# Patient Record
Sex: FEMALE | Race: WHITE | NOT HISPANIC OR LATINO | Employment: FULL TIME | URBAN - METROPOLITAN AREA
[De-identification: names, ages, dates, MRNs, and addresses within clinical notes are randomized per-mention and may not be internally consistent; named-entity substitution may affect disease eponyms.]

---

## 2020-09-14 ENCOUNTER — AMBULATORY - HEALTHEAST (OUTPATIENT)
Dept: ADMINISTRATIVE | Facility: CLINIC | Age: 20
End: 2020-09-14

## 2020-09-14 DIAGNOSIS — K90.0 CELIAC DISEASE: ICD-10-CM

## 2021-08-11 ENCOUNTER — TRANSFERRED RECORDS (OUTPATIENT)
Dept: HEALTH INFORMATION MANAGEMENT | Facility: CLINIC | Age: 21
End: 2021-08-11
Payer: COMMERCIAL

## 2021-11-17 ENCOUNTER — OFFICE VISIT (OUTPATIENT)
Dept: FAMILY MEDICINE | Facility: CLINIC | Age: 21
End: 2021-11-17

## 2021-11-17 VITALS
WEIGHT: 167.6 LBS | SYSTOLIC BLOOD PRESSURE: 116 MMHG | HEART RATE: 77 BPM | TEMPERATURE: 97.6 F | OXYGEN SATURATION: 98 % | BODY MASS INDEX: 28.61 KG/M2 | HEIGHT: 64 IN | DIASTOLIC BLOOD PRESSURE: 68 MMHG

## 2021-11-17 DIAGNOSIS — Z76.89 HEALTH CARE HOME: ICD-10-CM

## 2021-11-17 DIAGNOSIS — Z71.89 ACP (ADVANCE CARE PLANNING): ICD-10-CM

## 2021-11-17 DIAGNOSIS — R39.9 URINARY SYMPTOM OR SIGN: Primary | ICD-10-CM

## 2021-11-17 PROBLEM — K90.0 CELIAC DISEASE: Status: ACTIVE | Noted: 2019-05-23

## 2021-11-17 PROBLEM — E07.9 THYROID DYSFUNCTION: Status: ACTIVE | Noted: 2017-12-21

## 2021-11-17 PROBLEM — J45.21 MILD INTERMITTENT ASTHMA WITH ACUTE EXACERBATION: Status: ACTIVE | Noted: 2017-04-27

## 2021-11-17 LAB
APPEARANCE UR: CLEAR
BACTERIA, UR: ABNORMAL
BILIRUB UR QL: ABNORMAL
CASTS/LPF: ABNORMAL
COLOR UR: ABNORMAL
EP/HPF: ABNORMAL
GLUCOSE URINE: ABNORMAL MG/DL
HGB UR QL: ABNORMAL
KETONES UR QL: ABNORMAL MG/DL
MISC.: ABNORMAL
NITRITE UR QL STRIP: ABNORMAL
PH UR STRIP: 7.5 PH (ref 5–7)
PROT UR QL: ABNORMAL MG/DL
RBC, UR MICRO: ABNORMAL (ref ?–2)
SP GR UR STRIP: 1.01 (ref 1–1.03)
UROBILINOGEN UR QL STRIP: 0.2 EU/DL (ref 0.2–1)
WBC #/AREA URNS HPF: ABNORMAL /[HPF]
WBC, UR MICRO: ABNORMAL (ref ?–2)

## 2021-11-17 PROCEDURE — 87088 URINE BACTERIA CULTURE: CPT | Mod: 90 | Performed by: FAMILY MEDICINE

## 2021-11-17 PROCEDURE — 87086 URINE CULTURE/COLONY COUNT: CPT | Mod: 90 | Performed by: FAMILY MEDICINE

## 2021-11-17 PROCEDURE — 81001 URINALYSIS AUTO W/SCOPE: CPT | Performed by: FAMILY MEDICINE

## 2021-11-17 PROCEDURE — 99213 OFFICE O/P EST LOW 20 MIN: CPT | Performed by: FAMILY MEDICINE

## 2021-11-17 RX ORDER — CETIRIZINE HYDROCHLORIDE 5 MG/1
5 TABLET ORAL DAILY
COMMUNITY

## 2021-11-17 RX ORDER — ALBUTEROL SULFATE 90 UG/1
AEROSOL, METERED RESPIRATORY (INHALATION)
COMMUNITY
Start: 2020-06-01 | End: 2023-12-04

## 2021-11-17 RX ORDER — CIPROFLOXACIN 500 MG/1
500 TABLET, FILM COATED ORAL 2 TIMES DAILY
Qty: 14 TABLET | Refills: 0 | Status: SHIPPED | OUTPATIENT
Start: 2021-11-17 | End: 2021-11-24

## 2021-11-17 ASSESSMENT — MIFFLIN-ST. JEOR: SCORE: 1497.29

## 2021-11-17 NOTE — PROGRESS NOTES
Assessment & Plan   Problem List Items Addressed This Visit        Other    ACP (advance care planning)    Health Care Home      Other Visit Diagnoses     Urinary symptom or sign    -  Primary    Relevant Medications    ciprofloxacin (CIPRO) 500 MG tablet    Other Relevant Orders    URINALYSIS, ROUTINE ([facility]) (Completed)    URINE CULTURE AEROBIC BACTERIAL ([facility])           1. Urinary symptom or sign  This sounds like uti. Treat with antibiotics and followup as needed.  - URINALYSIS, ROUTINE ([facility])  - URINE CULTURE AEROBIC BACTERIAL ([facility])  - ciprofloxacin (CIPRO) 500 MG tablet; Take 1 tablet (500 mg) by mouth 2 times daily for 7 days  Dispense: 14 tablet; Refill: 0    2. ACP (advance care planning)      3. Health Care Home             FUTURE APPOINTMENTS:       - Follow-up visit as needed    No follow-ups on file.    Dilma Mccullough MD  [facility]    Subjective     Nursing Notes:   Mira Bedolla CMA  11/17/2021  4:48 PM  Signed  Chief Complaint   Patient presents with     Establish Care     UTI     low abdominal pressure/ discomfort, bloating, frequent urination, dysuria, symptoms started at work today     Pre-visit Screening:  Immunizations:  up to date  Colonoscopy:  NA  Mammogram: NA  Asthma Action Test/Plan:  NA  PHQ9:  NA  GAD7:  NA  Questioned patient about current smoking habits Pt. has never smoked.  Ok to leave detailed message on voice mail for today's visit only Yes, phone # [phone]           Deirdre Zapata is a 21 year old female who presents to clinic today for the following health issues   HPI     Here with lower abd discomfort and bloating, just started with pain with urination this afternoon. Sx all started today. At the end of her period. No chance of pregnant--has not been sexually active. No fevers.  Constipation daily--this is already a problem and is working with stool softeners and pelvic floor therapist. Increased urination.        Review of Systems  "  Constitutional, HEENT, cardiovascular, pulmonary, gi and gu systems are negative, except as otherwise noted.      Objective    /68 (BP Location: Right arm, Patient Position: Sitting, Cuff Size: Adult Regular)   Pulse 77   Temp 97.6  F (36.4  C) (Temporal)   Ht 1.613 m (5' 3.5\")   Wt 76 kg (167 lb 9.6 oz)   LMP 11/13/2021   SpO2 98%   BMI 29.22 kg/m    Body mass index is 29.22 kg/m .  Physical Exam   GENERAL: healthy, alert and no distress  ABDOMEN: soft, nontender, no hepatosplenomegaly, no masses and bowel sounds normal  MS: no gross musculoskeletal defects noted, no edema  NEURO: Normal strength and tone, mentation intact and speech normal  PSYCH: mentation appears normal, affect normal/bright    Results for orders placed or performed in visit on 11/17/21   URINALYSIS, ROUTINE ([facility])     Status: Abnormal   Result Value Ref Range    Color Urine Straw     Appearance Urine Clear     Glucose Urine Neg neg mg/dL    Bilirubin Urine Neg neg    Ketones Urine Neg neg mg/dL    Specific Gravity Urine 1.015 1.003 - 1.035    Blood Urine Large (A) neg    pH Urine 7.5 5.0 - 7.0 pH    Protein Urine neg neg - neg mg/dL    Urobilinogen Urine 0.2 0.2 - 1.0 EU/dL    Nitrite Urine Neg NEG    Leukocytes trace (A)     Wbc, Urine Micro neg neg - 2    RBC Micro Urine 0-2 neg - 2    EP/HPF occ     Bacteria Urine neg neg - neg    Casts/LPF neg     Miscellaneous     URINE CULTURE AEROBIC BACTERIAL ([facility])     Status: None   Result Value Ref Range    Urine Voided Culture SEE NOTE          "

## 2021-11-17 NOTE — PATIENT INSTRUCTIONS
Assessment & Plan   Problem List Items Addressed This Visit        Other    ACP (advance care planning)    Health Care Home      Other Visit Diagnoses     Urinary symptom or sign    -  Primary    Relevant Medications    ciprofloxacin (CIPRO) 500 MG tablet    Other Relevant Orders    URINALYSIS, ROUTINE ([facility]) (Completed)    URINE CULTURE AEROBIC BACTERIAL ([facility])           1. Urinary symptom or sign  This sounds like uti. Treat with antibiotics and followup as needed.  - URINALYSIS, ROUTINE ([facility])  - URINE CULTURE AEROBIC BACTERIAL ([facility])  - ciprofloxacin (CIPRO) 500 MG tablet; Take 1 tablet (500 mg) by mouth 2 times daily for 7 days  Dispense: 14 tablet; Refill: 0    2. ACP (advance care planning)      3. Health Care Home             FUTURE APPOINTMENTS:       - Follow-up visit as needed    No follow-ups on file.    Dilma Mccullough MD  [facility]

## 2021-11-17 NOTE — LETTER
November 19, 2021      Deirdre Zapata  [address]   [place] [address]        Dear ,    We are writing to inform you of your test results.    Your urine culture was negative/normal. Your symptoms sounded like a urinary tract infection. Please finish out the antibiotics and see me to recheck if you continue to have symptoms.    Resulted Orders   URINALYSIS, ROUTINE ([facility])   Result Value Ref Range    Color Urine Straw     Appearance Urine Clear     Glucose Urine Neg neg mg/dL    Bilirubin Urine Neg neg    Ketones Urine Neg neg mg/dL    Specific Gravity Urine 1.015 1.003 - 1.035    Blood Urine Large (A) neg    pH Urine 7.5 5.0 - 7.0 pH    Protein Urine neg neg - neg mg/dL    Urobilinogen Urine 0.2 0.2 - 1.0 EU/dL    Nitrite Urine Neg NEG    Leukocytes trace (A)     Wbc, Urine Micro neg neg - 2    RBC Micro Urine 0-2 neg - 2    EP/HPF occ     Bacteria Urine neg neg - neg    Casts/LPF neg     Miscellaneous     URINE CULTURE AEROBIC BACTERIAL ([facility])   Result Value Ref Range    Urine Voided Culture SEE NOTE       Comment:          CULTURE, URINE, ROUTINE         Micro Number:      [id number]    Test Status:       Final    Specimen Source:   Urine    Specimen Quality:  Adequate    Result:            Growth of mixed pooja was isolated, suggesting                       probable contamination. No further testing will                       be performed. If clinically indicated,                        recollection using a method to minimize                       contamination, with prompt transfer to Urine                       Culture Transport Tube, is recommended.         If you have any questions or concerns, please call the clinic at the number listed above.       Sincerely,      Dilma Mccullough MD

## 2021-11-17 NOTE — NURSING NOTE
Chief Complaint   Patient presents with     Establish Care     UTI     low abdominal pressure/ discomfort, bloating, frequent urination, dysuria, symptoms started at work today     Pre-visit Screening:  Immunizations:  up to date  Colonoscopy:  NA  Mammogram: NA  Asthma Action Test/Plan:  NA  PHQ9:  NA  GAD7:  NA  Questioned patient about current smoking habits Pt. has never smoked.  Ok to leave detailed message on voice mail for today's visit only Yes, phone # [phone]

## 2021-11-19 LAB — URINE VOIDED CULTURE: NORMAL

## 2021-12-09 ENCOUNTER — OFFICE VISIT (OUTPATIENT)
Dept: FAMILY MEDICINE | Facility: CLINIC | Age: 21
End: 2021-12-09

## 2021-12-09 VITALS
HEIGHT: 64 IN | DIASTOLIC BLOOD PRESSURE: 72 MMHG | BODY MASS INDEX: 28.51 KG/M2 | OXYGEN SATURATION: 98 % | WEIGHT: 167 LBS | TEMPERATURE: 98.3 F | HEART RATE: 95 BPM | SYSTOLIC BLOOD PRESSURE: 110 MMHG

## 2021-12-09 DIAGNOSIS — R09.81 CONGESTION OF PARANASAL SINUS: Primary | ICD-10-CM

## 2021-12-09 DIAGNOSIS — R53.83 FATIGUE, UNSPECIFIED TYPE: ICD-10-CM

## 2021-12-09 DIAGNOSIS — R07.0 THROAT PAIN: ICD-10-CM

## 2021-12-09 LAB
COVID-19: NEGATIVE
FLUAV AG UPPER RESP QL IA.RAPID: NORMAL
FLUBV AG UPPER RESP QL IA.RAPID: NORMAL
S PYO AG THROAT QL IA.RAPID: NEGATIVE

## 2021-12-09 PROCEDURE — 99213 OFFICE O/P EST LOW 20 MIN: CPT | Performed by: PHYSICIAN ASSISTANT

## 2021-12-09 PROCEDURE — 87804 INFLUENZA ASSAY W/OPTIC: CPT | Performed by: PHYSICIAN ASSISTANT

## 2021-12-09 PROCEDURE — 87880 STREP A ASSAY W/OPTIC: CPT | Performed by: PHYSICIAN ASSISTANT

## 2021-12-09 PROCEDURE — 87070 CULTURE OTHR SPECIMN AEROBIC: CPT | Performed by: PHYSICIAN ASSISTANT

## 2021-12-09 PROCEDURE — G2023 SPECIMEN COLLECT COVID-19: HCPCS | Performed by: PHYSICIAN ASSISTANT

## 2021-12-09 PROCEDURE — 87635 SARS-COV-2 COVID-19 AMP PRB: CPT | Performed by: PHYSICIAN ASSISTANT

## 2021-12-09 ASSESSMENT — MIFFLIN-ST. JEOR: SCORE: 1494.57

## 2021-12-09 NOTE — PROGRESS NOTES
"CC: Sick, sinuses    History:  Yennifer is here today with symptoms that started 4 days ago on Monday night. \"Hit like a truck\". Throat pain, headaches, body aches, ears plugged, facial pain, teeth pain Headaches spans over towards her temples. Had negative covid-19 test on day 2. Minimal nasal congestion. 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     Hx of sinus infections. Saw an ENT at [facility]. Did head CT at [facility] in [place]. Has been told that she had deviated septum, cocha bullosa left frontal sinus. Also has history of strep, but has had tonsillectomy, but still gets it as adult.     PMH, MEDICATIONS, ALLERGIES, SOCIAL AND FAMILY HISTORY in [facility] and reviewed by me personally.    ROS negative other than the symptoms noted above in the HPI.    Examination   /72 (BP Location: Right arm, Patient Position: Sitting, Cuff Size: Adult Large)   Pulse 95   Temp 98.3  F (36.8  C) (Oral)   Ht 1.613 m (5' 3.5\")   Wt 75.8 kg (167 lb)   LMP 11/13/2021   SpO2 98%   BMI 29.12 kg/m       Constitutional: Sitting comfortably, in no acute distress. Vital signs noted  Eyes: pupils equal round reactive to light and accomodation, extra ocular movements intact  Ears: external canals and TMs free of abnormalities  Nose: patent, without mucosal abnormalities  Mouth and throat: without erythema or lesions of the mucosa  Neck:  no adenopathy, trachea midline and normal to palpation, thyroid normal to palpation  Cardiovascular:  regular rate and rhythm, no murmurs, clicks, or gallops  Respiratory:  normal respiratory rate and rhythm, lungs clear to auscultation  SKIN: No jaundice/pallor/rash.   Psychiatric: mentation appears normal and affect normal/bright      A/P    ICD-10-CM    1. Congestion of paranasal sinus  R09.81 Rapid Strep Screen ([facility])     Influenza A and B ([facility])     COVID-19 ([facility])     CANCELED: Rapid Strep Screen ([facility])   2. Throat pain  R07.0 Rapid Strep Screen ([facility])     " Influenza A and B ([facility])     COVID-19 (BFP)     Throat Culture ( [facility])     CANCELED: Rapid Strep Screen ([facility])   3. Fatigue  R53.83 Rapid Strep Screen ([facility])     Influenza A and B (B[facility])     COVID-19 ([facility])     CANCELED: Rapid Strep Screen ([facility])       DISCUSSION:  RST today was negative- will culture to confirm and contact patient if positive. Influenza swab is negative. Covid-19 swab negative. Consistent with sinus infection. Likely still viral given relatively short duration. Recommended course of decongestant like Sudafed, that you have to ask for from the pharmacy counter. Avoid taking this near bedtime, as it can cause difficulty sleeping. Consider taking ibuprofen 2 tablets with food every 4-6 hours to help with pain and inflammation. Also consider applying hot wash cloth to face to sooth sinuses. Finally, could consider nasal rinses 1-2 times daily (ex. NetiPot, using distilled water, with sterile salt packet). Contact me next week via phone or MyChart if not seeing improvement, and I would consider antibiotic at that time.    follow up visit: As needed    Dawna Jacinto PA-C  [facility]

## 2021-12-09 NOTE — NURSING NOTE
Chief Complaint   Patient presents with     Sinus Problem     sinus congestion, throat pain, and headaches for 4 days, tested neg for covid on day 2. Has hx of sinus infection. 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     Throat Pain     Headache     Fatigue         Pre-visit Screening:  Immunizations:  up to date  Colonoscopy:  NA  Mammogram: NA  Asthma Action Test/Plan:  NA  PHQ9:  NA  GAD7:  NA  Questioned patient about current smoking habits Pt. has never smoked.  Ok to leave detailed message on voice mail for today's visit only Yes, phone # [phone]

## 2021-12-12 ENCOUNTER — MYC MEDICAL ADVICE (OUTPATIENT)
Dept: FAMILY MEDICINE | Facility: CLINIC | Age: 21
End: 2021-12-12

## 2021-12-12 DIAGNOSIS — J01.40 ACUTE NON-RECURRENT PANSINUSITIS: Primary | ICD-10-CM

## 2021-12-13 LAB — STREP GROUP A CULTURE, THROAT - QUEST: NORMAL

## 2021-12-13 RX ORDER — DOXYCYCLINE HYCLATE 100 MG
100 TABLET ORAL 2 TIMES DAILY
Qty: 20 TABLET | Refills: 0 | Status: SHIPPED | OUTPATIENT
Start: 2021-12-13 | End: 2021-12-23

## 2021-12-13 NOTE — TELEPHONE ENCOUNTER
Pt called asking if a prescription for Doxycycline could be sent in to [facility]. She is not getting better since previous visit. She is having sinus pressure, chest pain, and a cough.    Please advise # [phone]    Thanks, Mira WILSON

## 2022-01-06 ENCOUNTER — MYC REFILL (OUTPATIENT)
Dept: FAMILY MEDICINE | Facility: CLINIC | Age: 22
End: 2022-01-06

## 2022-01-06 DIAGNOSIS — J01.40 ACUTE NON-RECURRENT PANSINUSITIS: ICD-10-CM

## 2022-01-06 RX ORDER — DOXYCYCLINE HYCLATE 100 MG
100 TABLET ORAL 2 TIMES DAILY
Qty: 20 TABLET | Refills: 0 | Status: CANCELLED | OUTPATIENT
Start: 2022-01-06

## 2022-01-07 NOTE — TELEPHONE ENCOUNTER
Patient is requesting a refill of   Pending Prescriptions:                       Disp   Refills    doxycycline hyclate (VIBRA-TABS) 100 MG t*20 tab*0            Sig: Take 1 tablet (100 mg) by mouth 2 times daily    Patient needs to come in for a follow up visit.

## 2022-01-19 ENCOUNTER — MEDICAL CORRESPONDENCE (OUTPATIENT)
Dept: HEALTH INFORMATION MANAGEMENT | Facility: CLINIC | Age: 22
End: 2022-01-19
Payer: COMMERCIAL

## 2022-01-21 ENCOUNTER — TRANSCRIBE ORDERS (OUTPATIENT)
Dept: OTHER | Age: 22
End: 2022-01-21

## 2022-01-21 ENCOUNTER — TELEPHONE (OUTPATIENT)
Dept: OTHER | Age: 22
End: 2022-01-21

## 2022-01-21 DIAGNOSIS — J32.9 SINUSITIS: Primary | ICD-10-CM

## 2022-01-24 NOTE — TELEPHONE ENCOUNTER
FUTURE VISIT INFORMATION      FUTURE VISIT INFORMATION:    Date: 3/11/22    Time: 8:30 AM    Location: [facility]  REFERRAL INFORMATION:    Referring provider: Merlyn Kat NP    Referring providers clinic:  [facility]    Reason for visit/diagnosis: Sinusitis    RECORDS REQUESTED FROM:       Clinic name Comments Records Status Imaging Status   [facility] 12/9/21 - PCC OV with CHELSY Limon [facility] 1/19/22 - MyChart referral from Merlyn Kat NP  9/2/21 - ENT OV with CHELSY Hatch  7/5/21 - [facility] OV with Dr. Vinson Care Everywhere    [facility] 1/11/22 - ENT OV with Dr. Ac  1/2/18 - ENT OV with CHELSY Watts Care Everywhere    [facility] 9/8/21 - ALLERGY OV with Dr. Vargas Care Everywhere          [facility]. [phone]  Fax:[phone]   8/11/21 - CT Head Sinus     Sent to scan 1/27/22 1/24 Req  req 1/27/22  req 2/2/22 - PACS                       * 1/24/22 11:41 AM Faxed req to [facility] for images to be pushed to [facility]. - [place]  1/27/22 4:28PM rece[facility]ved a fax from [facility], images were not done with them it was done at [facility], sent a fax to [facility] - [place]   2/2/22 1:40PM received a fax from [facility], images were done at [facility], sent a fax to [facility] for images - [place]   2/10/22 11:20AM called [facility], stated images were pushed back on the 7th. Notified clinic that we did not receive them, they will re-push them - amay   *called back, am being told by [facility] that they are unable to send images because the images were not ordered by a [facility] provider, notified [facility] that the ordering provider is part of [facility] and Yaneth is referring to [facility]. Was already told this morning that they pushed the images on 2/7 but we did not receive them. They are asking for a signed VANDA but the patient needs to sign an VANDA for records to be released to her, not to us - [place]

## 2022-02-06 ENCOUNTER — HEALTH MAINTENANCE LETTER (OUTPATIENT)
Age: 22
End: 2022-02-06

## 2022-02-10 NOTE — TELEPHONE ENCOUNTER
Called patient and left a message. [facility] will not release the 8/11/21 CT Sinus to us even with a signed VANDA. They are asking that the patient request her own images. Left patient a message to see if she try to obtain her own images from [facility] (8/11/21 CT Sinus) and hand carry it to the appointment. Left my direct number for call back [phone] (ok to [facility]).    [facility]   Clinic      2/10/22 1:59PM update:   Called and spoke with the film room directly, explained that images were ordered by referring facility. Images will be pushed shortly and left a message with patient to notify that she no longer needs to get her records for us.     [facility]   Clinic

## 2022-02-23 ENCOUNTER — TRANSFERRED RECORDS (OUTPATIENT)
Dept: FAMILY MEDICINE | Facility: CLINIC | Age: 22
End: 2022-02-23

## 2022-03-07 NOTE — PROGRESS NOTES
SUBJECTIVE:   CC: Deirdre Zapata is an 21 year old woman who presents for preventive health visit.           Patient has been advised of split billing requirements and indicates understanding: Yes  HPI    Pt is new to me.   I have reviewed the following histories: Past Medical History, Past Surgical History, Social History, Family History, Problem List, Medication List and Allergies    1. Hx of anemia - Celiac  Taking iron during menstrual cycle.     2. Celiac 2019  - [facility]      3. Exercise induced asthma   Albuterol as needed    4. Irregular menese  LT Proacitve  Fatigue - stopped OCP 3 months ago  High testosterone  Periods were regular on OCP  Cycle 50 days in between.     5. Thyroid - not treated for this - checked with   [facility]  Dr. Grace    6. Vit D  Def  [id number] international unit(s) daily  Checked a couple d    7. Sertraline -   Depression/Anxiety  Previously on Lexapro  Started meds at 16  Not seeing therapist  Denies SI    8. Multiple joint pain  GI wants her worked up for autoimmune        Today's PHQ-2 Score:   PHQ-2 ( 1999 Pfizer) 3/8/2022   Q1: Little interest or pleasure in doing things 0   Q2: Feeling down, depressed or hopeless 0   PHQ-2 Score 0       Abuse: Current or Past (Physical, Sexual or Emotional) - No  Do you feel safe in your environment? Yes    Breast cancer screening discussion: NA      History of abnormal Pap smear: NO - age 21-29 PAP every 3 years recommended    Reviewed orders with patient.  Reviewed health maintenance and updated orders accordingly - Yes      Lab work is in process  Labs reviewed in EPIC  BP Readings from Last 3 Encounters:   03/08/22 108/64   12/09/21 110/72   11/17/21 116/68    Wt Readings from Last 3 Encounters:   03/08/22 75.7 kg (166 lb 12.8 oz)   12/09/21 75.8 kg (167 lb)   11/17/21 76 kg (167 lb 9.6 oz)                  Patient Active Problem List   Diagnosis     Iron deficiency anemia due to chronic blood loss      Allergic rhinitis due to animal hair and dander     Celiac disease     Mild intermittent asthma without complication     Irregular menses     Thyroid dysfunction     Vitamin D deficiency     ACP (advance care planning)     Health Care Home     Past Surgical History:   Procedure Laterality Date     DENTAL SURGERY  2017     PODIATRY/FOOT & ANKLE SURGERY REFERRAL Bilateral 2014    Dr. Tompkins     TONSILLECTOMY, ADENOIDECTOMY, COMBINED  2002       Social History     Tobacco Use     Smoking status: Never Smoker     Smokeless tobacco: Never Used   Substance Use Topics     Alcohol use: Yes     Alcohol/week: 3.0 standard drinks     Types: 3 Standard drinks or equivalent per week     Comment: weekends     Family History   Problem Relation Age of Onset     No Known Problems Mother      Hypothyroidism Father      Hypertension Father      Other - See Comments Sister         RSV     Asthma Sister      Breast Cancer Maternal Grandmother 60     Diabetes Type 2  Maternal Grandmother      Diabetes Type 2  Maternal Grandfather      Alzheimer Disease Maternal Grandfather      Colon Cancer Paternal Grandmother 70     Anxiety Disorder Paternal Grandmother      Alcoholism Paternal Grandfather          Current Outpatient Medications   Medication Sig Dispense Refill     albuterol (PROAIR HFA/PROVENTIL HFA/VENTOLIN HFA) 108 (90 Base) MCG/ACT inhaler INHALE 2 PUFFS BY MOUTH EVERY 4 HOURS AS NEEDED ONE-HALF  HOUR  BEFORE  EXERCISE       B Complex Vitamins (VITAMIN-B COMPLEX PO)        cetirizine (ZYRTEC) 5 MG tablet Take 5 mg by mouth daily       Cholecalciferol (VITAMIN D3 PO) Take 5,000 Units by mouth 2 times daily       Docusate Calcium (STOOL SOFTENER PO)        Ferrous Gluconate (IRON 27 PO)        MAGNESIUM GLYCINATE PO        sertraline (ZOLOFT) 100 MG tablet Take 1 tablet (100 mg) by mouth daily 90 tablet 0     sertraline (ZOLOFT) 50 MG tablet 50 mg daily        triamcinolone (NASACORT) 55 MCG/ACT nasal aerosol Spray 2 sprays into both  "nostrils daily       Allergies   Allergen Reactions     Other Environmental Allergy Hives and Itching     Augmentin GI Disturbance     Cats      Morphine Nausea and Vomiting     Dog Epithelium Hives and Itching     No lab results found.                        History reviewed. No pertinent past medical history.   Past Surgical History:   Procedure Laterality Date     DENTAL SURGERY  2017     PODIATRY/FOOT & ANKLE SURGERY REFERRAL Bilateral 2014    Dr. Tompkins     TONSILLECTOMY, ADENOIDECTOMY, COMBINED  2002       Review of Systems  CONSTITUTIONAL: NEGATIVE for fever, chills, change in weight  EYES: NEGATIVE for vision changes or irritation  ENT: NEGATIVE for ear, mouth and throat problems  RESP: NEGATIVE for significant cough or SOB  BREAST: NEGATIVE for masses, tenderness or discharge  CV: NEGATIVE for chest pain, palpitations or peripheral edema  GI: NEGATIVE for nausea, abdominal pain, heartburn, or change in bowel habits  : NEGATIVE for unusual urinary or vaginal symptoms. Periods are regular.  NEURO: NEGATIVE for weakness, dizziness or paresthesias     OBJECTIVE:   /64 (BP Location: Right arm, Patient Position: Sitting, Cuff Size: Adult Regular)   Pulse 88   Temp 97.8  F (36.6  C) (Temporal)   Ht 1.619 m (5' 3.75\")   Wt 75.7 kg (166 lb 12.8 oz)   LMP 03/04/2022   SpO2 97%   BMI 28.86 kg/m    Physical Exam  GENERAL: healthy, alert and no distress  EYES: Eyes grossly normal to inspection, PERRL and conjunctivae and sclerae normal  HENT: ear canals and TM's normal, nose and mouth without ulcers or lesions  NECK: no adenopathy, no asymmetry, masses, or scars and thyroid normal to palpation  RESP: lungs clear to auscultation - no rales, rhonchi or wheezes  BREAST: normal without masses, tenderness or nipple discharge and no palpable axillary masses or adenopathy  CV: regular rate and rhythm, normal S1 S2, no S3 or S4, no murmur, click or rub, no peripheral edema and peripheral pulses strong  ABDOMEN: " soft, nontender, no hepatosplenomegaly, no masses and bowel sounds normal   (female): normal female external genitalia, normal urethral meatus, vaginal mucosa pink, moist, well rugated, and normal cervix/adnexa/uterus without masses or discharge  MS: no gross musculoskeletal defects noted, no edema  SKIN: no suspicious lesions or rashes  NEURO: Normal strength and tone, mentation intact and speech normal  PSYCH: mentation appears normal, affect normal/bright    Diagnostic Test Results:  Labs reviewed in Epic    ASSESSMENT/PLAN:   Deirdre was seen today for physical, anxiety, derm problem, night sweats and arthritis.    Diagnoses and all orders for this visit:    Encounter for gynecological examination without abnormal finding  -     ThinPrep Pap and HPV (mRNA E6/E7)HPV-REFLEX ([facility])  -     VENOUS COLLECTION    Vitamin D deficiency  -     OFFICE/OUTPT VISIT,KENRICK AUSTIN III  Continue OTC    Thyroid dysfunction  -     OFFICE/OUTPT VISIT,KENRICK AUSTIN III    Mild intermittent asthma with acute exacerbation  -     OFFICE/OUTPT VISIT,KENRICK AUSTIN III    Irregular menses  -     OFFICE/OUTPT VISIT,KENRICK AUSTIN III  See me if > 90 day no menses  Keep journal     Celiac disease  -     OFFICE/OUTPT VISIT,KENRICK AUSTIN III  Seeing GI    Mild intermittent asthma without complication  -     OFFICE/OUTPT VISIT,KENRICK AUSTIN III    Allergic rhinitis due to animal hair and dander  -     OFFICE/OUTPT VISIT,EST,LEVL III    Iron deficiency anemia due to chronic blood loss  -     Hemogram with Platelets ([facility])  -     VENOUS COLLECTION  -     OFFICE/OUTPT VISIT,EST,LEVL III    Chronic fatigue  -     Hemogram with Platelets ([facility])  -     Adult Sleep Eval & Management [facility] Referral  -     VENOUS COLLECTION  -     OFFICE/OUTPT VISIT,EST,LEVL III  Sleep study advised      Multiple joint pain  -     CATRACHITA Scrn Rflx to Titer and Ptrn ([facility])  -     Lymes Antibodies Total ([facility])  -     ESR WESTERGREN ([facility])  -     C-Reactive Protein CRP ([facility])  -     RHEUMATOID  "FACTOR ([facility])  -     VENOUS COLLECTION  -     OFFICE/OUTPT VISIT,ESTLEVL III  Follow-up next visit - keep journal     Screening for cervical cancer  -     ThinPrep Pap and HPV (mRNA E6/E7)HPV-REFLEX ([facility])    JERRY (generalized anxiety disorder)    Mild episode of recurrent major depressive disorder (H)  -     sertraline (ZOLOFT) 100 MG tablet; Take 1 tablet (100 mg) by mouth daily  -     OFFICE/OUTPT VISIT,ESTLEVL III    Inc dose to 100 mg sertraline  Schedule therapy   See me 4-6 weeks    Overweight (BMI 25.0-29.9)  -     OFFICE/OUTPT VISIT,EST,LEVL III        Patient has been advised of split billing requirements and indicates understanding: Yes    COUNSELING:  Reviewed preventive health counseling, as reflected in patient instructions    Estimated body mass index is 28.86 kg/m  as calculated from the following:    Height as of this encounter: 1.619 m (5' 3.75\").    Weight as of this encounter: 75.7 kg (166 lb 12.8 oz).    Weight management plan: Discussed healthy diet and exercise guidelines    She reports that she has never smoked. She has never used smokeless tobacco.      Counseling Resources:  ATP IV Guidelines  Pooled Cohorts Equation Calculator  Breast Cancer Risk Calculator  BRCA-Related Cancer Risk Assessment: FHS-7 Tool  FRAX Risk Assessment  ICSI Preventive Guidelines  Dietary Guidelines for Americans, 2010  USDA's MyPlate  ASA Prophylaxis  Lung CA Screening    CHELSY Garrett  [facility]    "

## 2022-03-08 ENCOUNTER — TELEPHONE (OUTPATIENT)
Dept: FAMILY MEDICINE | Facility: CLINIC | Age: 22
End: 2022-03-08

## 2022-03-08 ENCOUNTER — OFFICE VISIT (OUTPATIENT)
Dept: FAMILY MEDICINE | Facility: CLINIC | Age: 22
End: 2022-03-08

## 2022-03-08 VITALS
BODY MASS INDEX: 28.48 KG/M2 | WEIGHT: 166.8 LBS | SYSTOLIC BLOOD PRESSURE: 108 MMHG | OXYGEN SATURATION: 97 % | HEART RATE: 88 BPM | TEMPERATURE: 97.8 F | HEIGHT: 64 IN | DIASTOLIC BLOOD PRESSURE: 64 MMHG

## 2022-03-08 DIAGNOSIS — E07.9 THYROID DYSFUNCTION: ICD-10-CM

## 2022-03-08 DIAGNOSIS — Z12.4 SCREENING FOR CERVICAL CANCER: ICD-10-CM

## 2022-03-08 DIAGNOSIS — J30.81 ALLERGIC RHINITIS DUE TO ANIMAL HAIR AND DANDER: ICD-10-CM

## 2022-03-08 DIAGNOSIS — E55.9 VITAMIN D DEFICIENCY: ICD-10-CM

## 2022-03-08 DIAGNOSIS — N92.6 IRREGULAR MENSES: ICD-10-CM

## 2022-03-08 DIAGNOSIS — E66.3 OVERWEIGHT (BMI 25.0-29.9): ICD-10-CM

## 2022-03-08 DIAGNOSIS — R53.82 CHRONIC FATIGUE: ICD-10-CM

## 2022-03-08 DIAGNOSIS — Z01.419 ENCOUNTER FOR GYNECOLOGICAL EXAMINATION WITHOUT ABNORMAL FINDING: Primary | ICD-10-CM

## 2022-03-08 DIAGNOSIS — D50.0 IRON DEFICIENCY ANEMIA DUE TO CHRONIC BLOOD LOSS: ICD-10-CM

## 2022-03-08 DIAGNOSIS — F33.0 MILD EPISODE OF RECURRENT MAJOR DEPRESSIVE DISORDER (H): ICD-10-CM

## 2022-03-08 DIAGNOSIS — M25.50 MULTIPLE JOINT PAIN: ICD-10-CM

## 2022-03-08 DIAGNOSIS — J45.20 MILD INTERMITTENT ASTHMA WITHOUT COMPLICATION: ICD-10-CM

## 2022-03-08 DIAGNOSIS — J45.21 MILD INTERMITTENT ASTHMA WITH ACUTE EXACERBATION: ICD-10-CM

## 2022-03-08 DIAGNOSIS — K90.0 CELIAC DISEASE: ICD-10-CM

## 2022-03-08 DIAGNOSIS — F41.1 GAD (GENERALIZED ANXIETY DISORDER): ICD-10-CM

## 2022-03-08 LAB
ERYTHROCYTE [DISTWIDTH] IN BLOOD BY AUTOMATED COUNT: 14.4 %
ERYTHROCYTE [SEDIMENTATION RATE] IN BLOOD: 9 MM/HR (ref 0–20)
HCT VFR BLD AUTO: 38.7 % (ref 35–47)
HEMOGLOBIN: 12.6 G/DL (ref 11.7–15.7)
MCH RBC QN AUTO: 29.2 PG (ref 26–33)
MCHC RBC AUTO-ENTMCNC: 32.6 G/DL (ref 31–36)
MCV RBC AUTO: 89.9 FL (ref 78–100)
PLATELET COUNT - QUEST: 272 10^9/L (ref 150–375)
RBC # BLD AUTO: 4.31 10*12/L (ref 3.8–5.2)
WBC # BLD AUTO: 10.3 10*9/L (ref 4–11)

## 2022-03-08 PROCEDURE — 86618 LYME DISEASE ANTIBODY: CPT | Mod: 90 | Performed by: PHYSICIAN ASSISTANT

## 2022-03-08 PROCEDURE — 99395 PREV VISIT EST AGE 18-39: CPT | Performed by: PHYSICIAN ASSISTANT

## 2022-03-08 PROCEDURE — 86140 C-REACTIVE PROTEIN: CPT | Mod: 90 | Performed by: PHYSICIAN ASSISTANT

## 2022-03-08 PROCEDURE — 88142 CYTOPATH C/V THIN LAYER: CPT | Mod: 90 | Performed by: PHYSICIAN ASSISTANT

## 2022-03-08 PROCEDURE — 85027 COMPLETE CBC AUTOMATED: CPT | Performed by: PHYSICIAN ASSISTANT

## 2022-03-08 PROCEDURE — 99213 OFFICE O/P EST LOW 20 MIN: CPT | Mod: 25 | Performed by: PHYSICIAN ASSISTANT

## 2022-03-08 PROCEDURE — 86431 RHEUMATOID FACTOR QUANT: CPT | Mod: 90 | Performed by: PHYSICIAN ASSISTANT

## 2022-03-08 PROCEDURE — 85651 RBC SED RATE NONAUTOMATED: CPT | Performed by: PHYSICIAN ASSISTANT

## 2022-03-08 PROCEDURE — 36415 COLL VENOUS BLD VENIPUNCTURE: CPT | Performed by: PHYSICIAN ASSISTANT

## 2022-03-08 PROCEDURE — 86038 ANTINUCLEAR ANTIBODIES: CPT | Mod: 90 | Performed by: PHYSICIAN ASSISTANT

## 2022-03-08 RX ORDER — SERTRALINE HYDROCHLORIDE 100 MG/1
100 TABLET, FILM COATED ORAL DAILY
Qty: 90 TABLET | Refills: 0 | Status: SHIPPED | OUTPATIENT
Start: 2022-03-08 | End: 2022-04-29 | Stop reason: DRUGHIGH

## 2022-03-08 RX ORDER — TRIAMCINOLONE ACETONIDE 55 UG/1
2 SPRAY, METERED NASAL DAILY
COMMUNITY
Start: 2022-03-08

## 2022-03-08 ASSESSMENT — ANXIETY QUESTIONNAIRES
6. BECOMING EASILY ANNOYED OR IRRITABLE: NEARLY EVERY DAY
2. NOT BEING ABLE TO STOP OR CONTROL WORRYING: SEVERAL DAYS
1. FEELING NERVOUS, ANXIOUS, OR ON EDGE: MORE THAN HALF THE DAYS
3. WORRYING TOO MUCH ABOUT DIFFERENT THINGS: SEVERAL DAYS
IF YOU CHECKED OFF ANY PROBLEMS ON THIS QUESTIONNAIRE, HOW DIFFICULT HAVE THESE PROBLEMS MADE IT FOR YOU TO DO YOUR WORK, TAKE CARE OF THINGS AT HOME, OR GET ALONG WITH OTHER PEOPLE: VERY DIFFICULT
5. BEING SO RESTLESS THAT IT IS HARD TO SIT STILL: NOT AT ALL
7. FEELING AFRAID AS IF SOMETHING AWFUL MIGHT HAPPEN: SEVERAL DAYS
GAD7 TOTAL SCORE: 10

## 2022-03-08 ASSESSMENT — ASTHMA QUESTIONNAIRES: ACT_TOTALSCORE: 23

## 2022-03-08 ASSESSMENT — PATIENT HEALTH QUESTIONNAIRE - PHQ9
5. POOR APPETITE OR OVEREATING: MORE THAN HALF THE DAYS
SUM OF ALL RESPONSES TO PHQ QUESTIONS 1-9: 9

## 2022-03-08 NOTE — NURSING NOTE
Chief Complaint   Patient presents with     Physical     fasting today, pap due today      Anxiety     hard time dealing with anxiety      Derm Problem     struggling with acne, high testosterone levels     Night Sweats     night sweats atleast once a week      Arthritis     joint pain within the last few months, mainly elbows and knees     Pre-visit Screening:  Immunizations:  up to date  Colonoscopy:  NA  Mammogram: NA  Asthma Action Test/Plan:  Done today   PHQ9:  Done today  GAD7:  Done today  Questioned patient about current smoking habits Pt. has never smoked.  Ok to leave detailed message on voice mail for today's visit only Yes, phone # [phone]

## 2022-03-08 NOTE — LETTER
My Asthma Action Plan    Name: Deirdre Zapata   YOB: 2000  Date: 3/8/2022   My doctor: CHELSY Garrett   My clinic: [facility]        My Rescue Medicine:   Albuterol inhaler (Proair/Ventolin/Proventil HFA)  2-4 puffs EVERY 4 HOURS as needed. Use a spacer if recommended by your provider.   My Asthma Severity:   Intermittent / Exercise Induced  Know your asthma triggers: exercise or sports             GREEN ZONE   Good Control    I feel good    No cough or wheeze    Can work, sleep and play without asthma symptoms       Take your asthma control medicine every day.     1. If exercise triggers your asthma, take your rescue medication    15 minutes before exercise or sports, and    During exercise if you have asthma symptoms  2. Spacer to use with inhaler: If you have a spacer, make sure to use it with your inhaler             YELLOW ZONE Getting Worse  I have ANY of these:    I do not feel good    Cough or wheeze    Chest feels tight    Wake up at night   1. Keep taking your Green Zone medications  2. Start taking your rescue medicine:    every 20 minutes for up to 1 hour. Then every 4 hours for 24-48 hours.  3. If you stay in the Yellow Zone for more than 12-24 hours, contact your doctor.  4. If you do not return to the Green Zone in 12-24 hours or you get worse, start taking your oral steroid medicine if prescribed by your provider.           RED ZONE Medical Alert - Get Help  I have ANY of these:    I feel awful    Medicine is not helping    Breathing getting harder    Trouble walking or talking    Nose opens wide to breathe       1. Take your rescue medicine NOW  2. If your provider has prescribed an oral steroid medicine, start taking it NOW  3. Call your doctor NOW  4. If you are still in the Red Zone after 20 minutes and you have not reached your doctor:    Take your rescue medicine again and    Call 911 or go to the emergency room right away    See your regular  doctor within 2 weeks of an Emergency Room or Urgent Care visit for follow-up treatment.          Annual Reminders:  Meet with Asthma Educator,  Flu Shot in the Fall, consider Pneumonia Vaccination for patients with asthma (aged 19 and older).    Pharmacy:    [facility] [address] [place] [facility] [address]  [facility] [address] [place] [facility] [address]    Electronically signed by CHELSY Garrett   Date: 03/08/22                    Asthma Triggers  How To Control Things That Make Your Asthma Worse    Triggers are things that make your asthma worse.  Look at the list below to help you find your triggers and   what you can do about them. You can help prevent asthma flare-ups by staying away from your triggers.      Trigger                                                          What you can do   Cigarette Smoke  Tobacco smoke can make asthma worse. Do not allow smoking in your home, car or around you.  Be sure no one smokes at a child s day care or school.  If you smoke, ask your health care provider for ways to help you quit.  Ask family members to quit too.  Ask your health care provider for a referral to Quit Plan to help you quit smoking, or call 3-459-425-PLAN.     Colds, Flu, Bronchitis  These are common triggers of asthma. Wash your hands often.  Don t touch your eyes, nose or mouth.  Get a flu shot every year.     Dust Mites  These are tiny bugs that live in cloth or carpet. They are too small to see. Wash sheets and blankets in hot water every week.   Encase pillows and mattress in dust mite proof covers.  Avoid having carpet if you can. If you have carpet, vacuum weekly.   Use a dust mask and HEPA vacuum.   Pollen and Outdoor Mold  Some people are allergic to trees, grass, or weed pollen, or molds. Try to keep your windows closed.  Limit time out doors when pollen count is high.   Ask you health care provider about taking medicine during allergy season.     Animal Dander  Some  people are allergic to skin flakes, urine or saliva from pets with fur or feathers. Keep pets with fur or feathers out of your home.    If you can t keep the pet outdoors, then keep the pet out of your bedroom.  Keep the bedroom door closed.  Keep pets off cloth furniture and away from stuffed toys.     Mice, Rats, and Cockroaches  Some people are allergic to the waste from these pests.   Cover food and garbage.  Clean up spills and food crumbs.  Store grease in the refrigerator.   Keep food out of the bedroom.   Indoor Mold  This can be a trigger if your home has high moisture. Fix leaking faucets, pipes, or other sources of water.   Clean moldy surfaces.  Dehumidify basement if it is damp and smelly.   Smoke, Strong Odors, and Sprays  These can reduce air quality. Stay away from strong odors and sprays, such as perfume, powder, hair spray, paints, smoke incense, paint, cleaning products, candles and new carpet.   Exercise or Sports  Some people with asthma have this trigger. Be active!  Ask your doctor about taking medicine before sports or exercise to prevent symptoms.    Warm up for 5-10 minutes before and after sports or exercise.     Other Triggers of Asthma  Cold air:  Cover your nose and mouth with a scarf.  Sometimes laughing or crying can be a trigger.  Some medicines and food can trigger asthma.

## 2022-03-09 LAB
ANA SCREEN - QUEST: NEGATIVE
CRP SERPL-MCNC: 4.8 MG/L (ref 0–0.8)
LYME SCREEN IGG AND IGM: <0.9 INDEX
RHEUMATOID FACT SER NEPH-ACNC: <14 IU/ML (ref 0–20)

## 2022-03-09 ASSESSMENT — ANXIETY QUESTIONNAIRES: GAD7 TOTAL SCORE: 10

## 2022-03-11 ENCOUNTER — PRE VISIT (OUTPATIENT)
Dept: OTOLARYNGOLOGY | Facility: CLINIC | Age: 22
End: 2022-03-11

## 2022-03-11 LAB
CLINICAL HISTORY - QUEST: NORMAL
COMMENT - QUEST: NORMAL
CYTOTECHNOLOGIST - QUEST: NORMAL
DESCRIPTIVE DIAGNOSIS - QUEST: NORMAL
LAST PAP DX - QUEST: NORMAL
LMP - QUEST: NORMAL
PREV BX DX - QUEST: NORMAL
SOURCE: NORMAL
STATEMENT OF ADEQUACY - QUEST: NORMAL

## 2022-03-12 PROBLEM — E28.2 PCOS (POLYCYSTIC OVARIAN SYNDROME): Status: ACTIVE | Noted: 2022-03-12

## 2022-04-29 ENCOUNTER — OFFICE VISIT (OUTPATIENT)
Dept: FAMILY MEDICINE | Facility: CLINIC | Age: 22
End: 2022-04-29

## 2022-04-29 VITALS
WEIGHT: 166 LBS | HEIGHT: 64 IN | TEMPERATURE: 97.4 F | RESPIRATION RATE: 20 BRPM | HEART RATE: 72 BPM | SYSTOLIC BLOOD PRESSURE: 108 MMHG | DIASTOLIC BLOOD PRESSURE: 68 MMHG | BODY MASS INDEX: 28.34 KG/M2

## 2022-04-29 DIAGNOSIS — F41.1 GAD (GENERALIZED ANXIETY DISORDER): ICD-10-CM

## 2022-04-29 DIAGNOSIS — R53.82 CHRONIC FATIGUE: ICD-10-CM

## 2022-04-29 DIAGNOSIS — M25.50 MULTIPLE JOINT PAIN: ICD-10-CM

## 2022-04-29 DIAGNOSIS — E28.2 PCOS (POLYCYSTIC OVARIAN SYNDROME): Primary | ICD-10-CM

## 2022-04-29 DIAGNOSIS — F33.0 MILD EPISODE OF RECURRENT MAJOR DEPRESSIVE DISORDER (H): ICD-10-CM

## 2022-04-29 PROCEDURE — 99214 OFFICE O/P EST MOD 30 MIN: CPT | Performed by: PHYSICIAN ASSISTANT

## 2022-04-29 RX ORDER — HYDROXYZINE HYDROCHLORIDE 25 MG/1
25 TABLET, FILM COATED ORAL 3 TIMES DAILY PRN
Qty: 30 TABLET | Refills: 0 | Status: SHIPPED | OUTPATIENT
Start: 2022-04-29

## 2022-04-29 RX ORDER — VENLAFAXINE HYDROCHLORIDE 75 MG/1
75 CAPSULE, EXTENDED RELEASE ORAL DAILY
Qty: 30 CAPSULE | Refills: 1 | Status: SHIPPED | OUTPATIENT
Start: 2022-04-29 | End: 2022-05-17 | Stop reason: SINTOL

## 2022-04-29 RX ORDER — DROSPIRENONE AND ETHINYL ESTRADIOL 0.02-3(28)
1 KIT ORAL DAILY
COMMUNITY
Start: 2022-04-06

## 2022-04-29 ASSESSMENT — PATIENT HEALTH QUESTIONNAIRE - PHQ9
5. POOR APPETITE OR OVEREATING: MORE THAN HALF THE DAYS
SUM OF ALL RESPONSES TO PHQ QUESTIONS 1-9: 6

## 2022-04-29 ASSESSMENT — ANXIETY QUESTIONNAIRES
1. FEELING NERVOUS, ANXIOUS, OR ON EDGE: MORE THAN HALF THE DAYS
3. WORRYING TOO MUCH ABOUT DIFFERENT THINGS: NOT AT ALL
5. BEING SO RESTLESS THAT IT IS HARD TO SIT STILL: NOT AT ALL
7. FEELING AFRAID AS IF SOMETHING AWFUL MIGHT HAPPEN: NOT AT ALL
2. NOT BEING ABLE TO STOP OR CONTROL WORRYING: MORE THAN HALF THE DAYS
IF YOU CHECKED OFF ANY PROBLEMS ON THIS QUESTIONNAIRE, HOW DIFFICULT HAVE THESE PROBLEMS MADE IT FOR YOU TO DO YOUR WORK, TAKE CARE OF THINGS AT HOME, OR GET ALONG WITH OTHER PEOPLE: VERY DIFFICULT
GAD7 TOTAL SCORE: 8
6. BECOMING EASILY ANNOYED OR IRRITABLE: MORE THAN HALF THE DAYS

## 2022-04-29 NOTE — NURSING NOTE
Deirdre Zapata is here for a medication check after dose change of sertraline.    Questioned patient about current smoking habits.  Pt. has never smoked.  PULSE regular  My Chart: active  CLASSIFICATION OF OVERWEIGHT AND OBESITY BY BMI                        Obesity Class           BMI(kg/m2)  Underweight                                    < 18.5  Normal                                         18.5-24.9  Overweight                                     25.0-29.9  OBESITY                     I                  30.0-34.9                             II                 35.0-39.9  EXTREME OBESITY             III                >40                            Patient's  BMI Body mass index is 28.72 kg/m .  http://hin.nhlbi.nih.gov/menuplanner/menu.cgi  Pre-visit planning  Immunizations - up to date  Colonoscopy -   Mammogram -   Asthma -   PHQ9 -    JERRY-7 -      The patient has verbalized that it is ok to leave a detailed voice message on the patient's cell phone with results/recommendations from this visit.

## 2022-04-29 NOTE — PROGRESS NOTES
Assessment & Plan     PCOS (polycystic ovarian syndrome)  Start OCP    Chronic fatigue    - venlafaxine (EFFEXOR-XR) 75 MG 24 hr capsule  Dispense: 30 capsule; Refill: 1  - Adult Rheumatology  Referral    Multiple joint pain  Suspect Fibromyalgia - will get 2nd opinion from Rheum  - Adult Rheumatology  Referral    JERRY (generalized anxiety disorder)    - venlafaxine (EFFEXOR-XR) 75 MG 24 hr capsule  Dispense: 30 capsule; Refill: 1  - hydrOXYzine (ATARAX) 25 MG tablet  Dispense: 30 tablet; Refill: 0    Mild episode of recurrent major depressive disorder (H)    - venlafaxine (EFFEXOR-XR) 75 MG 24 hr capsule  Dispense: 30 capsule; Refill: 1      Start Venlafaxine, Vistaril prn  I reviewed the patient information handout from Up To Date on this medication including side effects with the patient.    Max to help facilitate appt with Psychiatrist for evaluation to ensure MDD/JERRY correct dg.     Email me weekly, call/RTC  With other concerns  Virtual appt 1 month      CHELSY Garrett  [facility]    Subjective     Nursing Notes:   Elva Dorantes, [facility]  4/29/2022  8:12 AM  Signed  Deirdre Zapata is here for a medication check after dose change of sertraline.    Questioned patient about current smoking habits.  Pt. has never smoked.  PULSE regular  My Chart: active  CLASSIFICATION OF OVERWEIGHT AND OBESITY BY BMI                        Obesity Class           BMI(kg/m2)  Underweight                                    < 18.5  Normal                                         18.5-24.9  Overweight                                     25.0-29.9  OBESITY                     I                  30.0-34.9                             II                 35.0-39.9  EXTREME OBESITY             III                >40                            Patient's  BMI Body mass index is 28.72 kg/m .  http://hin.nhlbi.nih.gov/menuplanner/menu.cgi  Pre-visit planning  Immunizations - up to  date  Colonoscopy -   Mammogram -   Asthma -   PHQ9 -    JERRY-7 -                Deirdre Zapata is a 21 year old female who presents to clinic today for the following health issues     HPI       Follow-up from CPE    1. Inc. Zoloft depression/anxiety  Initially dg 17  Citalopram, Zoloft  Anxiety on Maternal side - mother's sister - mental instability  Paternal grandmother anxiety, panic attacks.  Both parents with anxiety  Zoloft has improved the anxiety but anxiety still present.     Younger sibling - depression/anxiety  OCD    Reached out for therapy appt.     2. Multiple joint pain - elbows, knees, back (in PT), wrist (OT)    3. Fatigue - referred to Sleep  Study is  Scheduled    4. Irregular menses - hasn't started birth control yet.     5. Celiac         Current Medications  Current Outpatient Medications   Medication Sig Dispense Refill     albuterol (PROAIR HFA/PROVENTIL HFA/VENTOLIN HFA) 108 (90 Base) MCG/ACT inhaler INHALE 2 PUFFS BY MOUTH EVERY 4 HOURS AS NEEDED ONE-HALF  HOUR  BEFORE  EXERCISE       B Complex Vitamins (VITAMIN-B COMPLEX PO)        cetirizine (ZYRTEC) 5 MG tablet Take 5 mg by mouth daily       Cholecalciferol (VITAMIN D3 PO) Take 5,000 Units by mouth 2 times daily       Docusate Calcium (STOOL SOFTENER PO)        Ferrous Gluconate (IRON 27 PO)        hydrOXYzine (ATARAX) 25 MG tablet Take 1 tablet (25 mg) by mouth 3 times daily as needed for anxiety 30 tablet 0     MAGNESIUM GLYCINATE PO        triamcinolone (NASACORT) 55 MCG/ACT nasal aerosol Spray 2 sprays into both nostrils daily       venlafaxine (EFFEXOR-XR) 75 MG 24 hr capsule Take 1 capsule (75 mg) by mouth daily 30 capsule 1     VESTURA 3-0.02 MG tablet Take 1 tablet by mouth daily           Constitutional, HEENT, Cardiovascular, Pulmonary, GI and  systems are negative, except as otherwise noted.          Objective    /68 (BP Location: Right arm, Patient Position: Chair, Cuff Size: Adult Regular)   Pulse 72   Temp  "97.4  F (36.3  C) (Temporal)   Resp 20   Ht 1.619 m (5' 3.75\")   Wt 75.3 kg (166 lb)   LMP 04/04/2022   BMI 28.72 kg/m    Body mass index is 28.72 kg/m .  Physical Exam   GENERAL: healthy, alert and no distress    Mental Status Exam:   Appearance: calm  Grooming: adequately groomed  Demeanor: engaged, cooperative  Affect: normal  Speech: Normal.  Gait:Normal.  Movements: Normal  Form of thought: Logical, Linear and Goal directed  Thought content:  Normal  Insight:Good   Judgment: Good   Cognition: Good     Tender SCM, costochondral,arms, knees, occipital, trap, supraspinous.                      "

## 2022-04-29 NOTE — LETTER
My Depression Action Plan  Name: Deirdre Zapata   Date of Birth 2000  Date: 4/29/2022    My doctor: Kimberly Marc   My clinic: [facility]  [address] [place] [address]  [address]  [facility] [id number] [phone]          GREEN    ZONE   Good Control    What it looks like:     Things are going generally well. You have normal ups and downs. You may even feel depressed from time to time, but bad moods usually last less than a day.   What you need to do:  1. Continue to care for yourself (see self care plan)  2. Check your depression survival kit and update it as needed  3. Follow your physician s recommendations including any medication.  4. Do not stop taking medication unless you consult with your physician first.           YELLOW         ZONE Getting Worse    What it looks like:     Depression is starting to interfere with your life.     It may be hard to get out of bed; you may be starting to isolate yourself from others.    Symptoms of depression are starting to last most all day and this has happened for several days.     You may have suicidal thoughts but they are not constant.   What you need to do:     1. Call your care team. Your response to treatment will improve if you keep your care team informed of your progress. Yellow periods are signs an adjustment may need to be made.     2. Continue your self-care.  Just get dressed and ready for the day.  Don't give yourself time to talk yourself out of it.    3. Talk to someone in your support network.    4. Open up your Depression Self-Care Plan/Wellness Kit.           RED    ZONE Medical Alert - Get Help    What it looks like:     Depression is seriously interfering with your life.     You may experience these or other symptoms: You can t get out of bed most days, can t work or engage in other necessary activities, you have trouble taking care of basic hygiene, or basic responsibilities, thoughts of suicide or  death that will not go away, self-injurious behavior.     What you need to do:  1. Call your care team and request a same-day appointment. If they are not available (weekends or after hours) call your local crisis line, emergency room or 911.          Depression Self-Care Plan / Wellness Kit    Many people find that medication and therapy are helpful treatments for managing depression. In addition, making small changes to your everyday life can help to boost your mood and improve your wellbeing. Below are some tips for you to consider. Be sure to talk with your medical provider and/or behavioral health consultant if your symptoms are worsening or not improving.     Sleep   Sleep hygiene  means all of the habits that support good, restful sleep. It includes maintaining a consistent bedtime and wake time, using your bedroom only for sleeping or sex, and keeping the bedroom dark and free of distractions like a computer, smartphone, or television.     Develop a Healthy Routine  Maintain good hygiene. Get out of bed in the morning, make your bed, brush your teeth, take a shower, and get dressed. Don t spend too much time viewing media that makes you feel stressed. Find time to relax each day.    Exercise  Get some form of exercise every day. This will help reduce pain and release endorphins, the  feel good  chemicals in your brain. It can be as simple as just going for a walk or doing some gardening, anything that will get you moving.      Diet  Strive to eat healthy foods, including fruits and vegetables. Drink plenty of water. Avoid excessive sugar, caffeine, alcohol, and other mood-altering substances.     Stay Connected with Others  Stay in touch with friends and family members.    Manage Your Mood  Try deep breathing, massage therapy, biofeedback, or meditation. Take part in fun activities when you can. Try to find something to smile about each day.     Psychotherapy  Be open to working with a therapist if your  provider recommends it.     Medication  Be sure to take your medication as prescribed. Most anti-depressants need to be taken every day. It usually takes several weeks for medications to work. Not all medicines work for all people. It is important to follow-up with your provider to make sure you have a treatment plan that is working for you. Do not stop your medication abruptly without first discussing it with your provider.    Crisis Resources   These hotlines are for both adults and children. They and are open 24 hours a day, 7 days a week unless noted otherwise.      National Suicide Prevention Lifeline   1-647-443-JZAR (1074)      Crisis Text Line    www.crisistextline.org  Text HOME to 638336 from anywhere in the [place], anytime, about any type of crisis. A live, trained crisis counselor will receive the text and respond quickly.      [place] Lifeline for LGBTQ Youth  A national crisis intervention and suicide lifeline for LGBTQ youth under 25. Provides a safe place to talk without judgement. Call 1-611.824.9918; text START to 532027 or visit www.thetrevorproject.org to talk to a trained counselor.      For [facility] crisis numbers, visit the [facility] website at:  https://[place].gov/dhs/people-we-serve/adults/health-care/mental-health/resources/crisis-contacts.jsp

## 2022-04-30 ASSESSMENT — ANXIETY QUESTIONNAIRES: GAD7 TOTAL SCORE: 8

## 2022-05-04 ENCOUNTER — TELEPHONE (OUTPATIENT)
Dept: FAMILY MEDICINE | Facility: CLINIC | Age: 22
End: 2022-05-04

## 2022-05-04 NOTE — TELEPHONE ENCOUNTER
I left a message for patient to call me back regarding referral for therapist. I will give patient     [facility] [place]  [address] [address] [address]  [phone] -- appt line  [phone] -- fax    [facility]  [address] [place] [address]  [address]  [phone] - appt line  [phone] - fax    [facility]  [address] [address]  [phone] -- appt line  [phone] -- fax

## 2022-05-10 ENCOUNTER — TELEPHONE (OUTPATIENT)
Dept: FAMILY MEDICINE | Facility: CLINIC | Age: 22
End: 2022-05-10

## 2022-05-10 NOTE — TELEPHONE ENCOUNTER
I talked with patient regarding referral for therapist. Patient will call [facility].  Will call me back with appt info.     [facility]  [address]  [place] [id number] [phone] - appt line   [phone] - fax    [facility]  [address]  [facility] [id number] [phone] - appt line    I will follow up with patient 5/16/2022 if no call

## 2022-05-11 ENCOUNTER — TELEPHONE (OUTPATIENT)
Dept: FAMILY MEDICINE | Facility: CLINIC | Age: 22
End: 2022-05-11

## 2022-05-11 NOTE — TELEPHONE ENCOUNTER
Pt called back. She is having increased anxiety, fogginess, some nausea, and today experiencing panic attacks. I called her and we discussed she would stop her venlafaxine and I will contact her once I hear from you. She is aware you are out of the office all week.    Please advise # [phone]    Thanks, Mira WILSON

## 2022-05-11 NOTE — TELEPHONE ENCOUNTER
Pt called stating she is having a poor reaction to her new anxiety medications. She stated she feels more anxious. Left detailed message on patients voicemail asking when she started the medication, when her side effects started, and what side effects she is having. Will await her return call.

## 2022-05-12 NOTE — TELEPHONE ENCOUNTER
Please have her stop med. I want her to see one of our MDs in clinic for further eval this week to discuss alt. Tx.  Please schedule with Dr. Renee if available in the next week    Kimberly Marc PA-C  5/12/2022

## 2022-05-12 NOTE — TELEPHONE ENCOUNTER
Left detailed message to call back and schedule appt with [facility] to discuss starting a new medication.

## 2022-05-14 ASSESSMENT — SLEEP AND FATIGUE QUESTIONNAIRES
HOW LIKELY ARE YOU TO NOD OFF OR FALL ASLEEP WHILE LYING DOWN TO REST IN THE AFTERNOON WHEN CIRCUMSTANCES PERMIT: HIGH CHANCE OF DOZING
HOW LIKELY ARE YOU TO NOD OFF OR FALL ASLEEP WHILE SITTING AND READING: SLIGHT CHANCE OF DOZING
HOW LIKELY ARE YOU TO NOD OFF OR FALL ASLEEP WHILE SITTING INACTIVE IN A PUBLIC PLACE: WOULD NEVER DOZE
HOW LIKELY ARE YOU TO NOD OFF OR FALL ASLEEP WHILE SITTING QUIETLY AFTER LUNCH WITHOUT ALCOHOL: WOULD NEVER DOZE
HOW LIKELY ARE YOU TO NOD OFF OR FALL ASLEEP WHILE WATCHING TV: MODERATE CHANCE OF DOZING
HOW LIKELY ARE YOU TO NOD OFF OR FALL ASLEEP WHILE SITTING AND TALKING TO SOMEONE: WOULD NEVER DOZE
HOW LIKELY ARE YOU TO NOD OFF OR FALL ASLEEP IN A CAR, WHILE STOPPED FOR A FEW MINUTES IN TRAFFIC: WOULD NEVER DOZE
HOW LIKELY ARE YOU TO NOD OFF OR FALL ASLEEP WHEN YOU ARE A PASSENGER IN A CAR FOR AN HOUR WITHOUT A BREAK: MODERATE CHANCE OF DOZING

## 2022-05-17 ENCOUNTER — OFFICE VISIT (OUTPATIENT)
Dept: FAMILY MEDICINE | Facility: CLINIC | Age: 22
End: 2022-05-17

## 2022-05-17 VITALS
RESPIRATION RATE: 20 BRPM | BODY MASS INDEX: 28.37 KG/M2 | WEIGHT: 164 LBS | OXYGEN SATURATION: 96 % | HEART RATE: 88 BPM | DIASTOLIC BLOOD PRESSURE: 62 MMHG | TEMPERATURE: 98 F | SYSTOLIC BLOOD PRESSURE: 104 MMHG

## 2022-05-17 DIAGNOSIS — F41.1 GAD (GENERALIZED ANXIETY DISORDER): Primary | ICD-10-CM

## 2022-05-17 PROCEDURE — 99214 OFFICE O/P EST MOD 30 MIN: CPT | Performed by: STUDENT IN AN ORGANIZED HEALTH CARE EDUCATION/TRAINING PROGRAM

## 2022-05-17 RX ORDER — SERTRALINE HYDROCHLORIDE 100 MG/1
100 TABLET, FILM COATED ORAL DAILY
Qty: 90 TABLET | Refills: 1 | Status: SHIPPED | OUTPATIENT
Start: 2022-05-17 | End: 2022-06-20

## 2022-05-17 ASSESSMENT — ANXIETY QUESTIONNAIRES
2. NOT BEING ABLE TO STOP OR CONTROL WORRYING: MORE THAN HALF THE DAYS
6. BECOMING EASILY ANNOYED OR IRRITABLE: MORE THAN HALF THE DAYS
7. FEELING AFRAID AS IF SOMETHING AWFUL MIGHT HAPPEN: NOT AT ALL
3. WORRYING TOO MUCH ABOUT DIFFERENT THINGS: MORE THAN HALF THE DAYS
5. BEING SO RESTLESS THAT IT IS HARD TO SIT STILL: SEVERAL DAYS
GAD7 TOTAL SCORE: 11
IF YOU CHECKED OFF ANY PROBLEMS ON THIS QUESTIONNAIRE, HOW DIFFICULT HAVE THESE PROBLEMS MADE IT FOR YOU TO DO YOUR WORK, TAKE CARE OF THINGS AT HOME, OR GET ALONG WITH OTHER PEOPLE: EXTREMELY DIFFICULT
1. FEELING NERVOUS, ANXIOUS, OR ON EDGE: MORE THAN HALF THE DAYS

## 2022-05-17 ASSESSMENT — PATIENT HEALTH QUESTIONNAIRE - PHQ9
SUM OF ALL RESPONSES TO PHQ QUESTIONS 1-9: 7
5. POOR APPETITE OR OVEREATING: MORE THAN HALF THE DAYS

## 2022-05-17 NOTE — PROGRESS NOTES
Assessment & Plan     1. JERRY (generalized anxiety disorder)  Patient new to me, PCP and care everywhere records reviewed. JERRY with rare panic attacks. Discussed tx options, including new medication or restarting zoloft and titrating to higher dose which can be helpful with JERRY. Given zoloft was most helpful of prior meds pt elected to try this and will gradually increase to 200mg daily as tolerated. Potential risks and side effects reviewed. Also working to establish with psychologist, and has sleep consult, emphasized importance of both. Follow-up one month, sooner prn.   - sertraline (ZOLOFT) 100 MG tablet    Patient Instructions   Restart zoloft, increase as tolerated:  50mg for 1 week  100mg for 1 week  150mg for 1 week  Then 200mg daily    Continue hydroxyzine as needed, would be ok taking 50mg at a time if needed    Follow-up in one month, sooner if needed    Could consider adding buspar in the future     30 minutes spent on the date of the encounter doing chart review, history and exam, documentation and further activities per the note    Rickey Renee MD, [facility]       Subjective     Deirdre Zapata is a 21 year old female who presents to clinic today for the following health issues:    HPI   Chief Complaint   Patient presents with     Recheck Medication     Wants to switch anxiety medication, venlafaxine causing unwanted side effects        Anxiety Follow-Up    How are you doing with your anxiety since your last visit? No change, not great    Have you had a significant life event? Health Concerns Celiac flared this weekend    Panic attacks 1/month but most bothered by chronic daily anxiety  Tries to stay active. Coaches dance, has a cat  Fatigue prominent, has sleep consult scheduled  Diet good  TSH wnl July 2021    Past meds:  escitalopram - slightly helpful  zoloft 50-100mg - slightly helpful, best of previous meds  Venlafaxine 75mg - not helpful, stopped 3 days  ago    Current:  Hydroxyzine 25mg  helpful, using 2-3x/wk      Social History     Tobacco Use     Smoking status: Never Smoker     Smokeless tobacco: Never Used   Substance Use Topics     Alcohol use: Yes     Alcohol/week: 3.0 standard drinks     Types: 3 Standard drinks or equivalent per week     Comment: weekends     Drug use: Never     JERRY-7 SCORE 3/8/2022 4/29/2022 5/17/2022   Total Score 10 8 11     PHQ 3/8/2022 4/29/2022 5/17/2022   PHQ-9 Total Score 9 6 7   Q9: Thoughts of better off dead/self-harm past 2 weeks Not at all Not at all Not at all           Objective    /62 (BP Location: Left arm, Patient Position: Sitting, Cuff Size: Adult Large)   Pulse 88   Temp 98  F (36.7  C) (Temporal)   Resp 20   Wt 74.4 kg (164 lb)   LMP 05/01/2022 (Exact Date)   SpO2 96%   BMI 28.37 kg/m    Body mass index is 28.37 kg/m .  Physical Exam   Alert, NAD  NC/AT  Sclerae anicteric  Regular  Resp nonlabored  Skin warm and dry  No focal neuro deficits. Speech intact. Normal gait.  Appropriate affect and thought content, no SI/HI

## 2022-05-17 NOTE — PATIENT INSTRUCTIONS
Restart zoloft, increase as tolerated:  50mg for 1 week  100mg for 1 week  150mg for 1 week  Then 200mg daily    Continue hydroxyzine as needed, would be ok taking 50mg at a time if needed    Follow-up in one month, sooner if needed    Could consider adding buspar in the future

## 2022-05-17 NOTE — TELEPHONE ENCOUNTER
I left at message for patient to call me back regarding note below. Did she schedule?   I have also sent a MY CHART message asking

## 2022-05-17 NOTE — NURSING NOTE
Chief Complaint   Patient presents with     Recheck Medication     Wants to switch anxiety medication, venlafaxine causing unwanted side effects      Pre-visit Screening:  Immunizations:  Due for hep b shot  Colonoscopy:  NA  Mammogram: NA  Asthma Action Test/Plan:  NA  PHQ9:  Given today   GAD7:  Given today   Questioned patient about current smoking habits Pt. has never smoked.  Ok to leave detailed message on voice mail for today's visit only Yes, phone # [phone]

## 2022-05-18 ASSESSMENT — ANXIETY QUESTIONNAIRES: GAD7 TOTAL SCORE: 11

## 2022-05-19 ENCOUNTER — VIRTUAL VISIT (OUTPATIENT)
Dept: SLEEP MEDICINE | Facility: CLINIC | Age: 22
End: 2022-05-19
Attending: PHYSICIAN ASSISTANT
Payer: COMMERCIAL

## 2022-05-19 VITALS — HEIGHT: 64 IN | BODY MASS INDEX: 28 KG/M2 | WEIGHT: 164 LBS

## 2022-05-19 DIAGNOSIS — R53.82 CHRONIC FATIGUE: Primary | ICD-10-CM

## 2022-05-19 DIAGNOSIS — F32.A ANXIETY AND DEPRESSION: ICD-10-CM

## 2022-05-19 DIAGNOSIS — F41.9 ANXIETY AND DEPRESSION: ICD-10-CM

## 2022-05-19 DIAGNOSIS — G47.50 PARASOMNIA, UNSPECIFIED TYPE: ICD-10-CM

## 2022-05-19 PROCEDURE — 99204 OFFICE O/P NEW MOD 45 MIN: CPT | Mod: 95 | Performed by: PHYSICIAN ASSISTANT

## 2022-05-19 NOTE — PROGRESS NOTES
"Yennifer is a 21 year old who is being evaluated via a billable video visit.      How would you like to obtain your AVS? MyChart  If the video visit is dropped, the invitation should be resent by: Text to cell phone: [phone]   Will anyone else be joining your video visit? No     Brianne Denis Facilitator          Video-Visit Details    Type of service:  Video Visit    Video Start Time: 8:10AM    Video End Time:8:32AM    Originating Location (pt. Location): Home    Distant Location (provider location):  [facility]     Platform used for Video Visit: [facility]     Outpatient Sleep Medicine Consultation:      Name: Deirdre Zapata MRN# [id number]   Age: 21 year old YOB: 2000     Date of Consultation: May 19, 2022  Consultation is requested by: CHELSY Garrett  [address], [address]  [place] [address] Kimberly Carrizales*  Primary care provider: Kimberly Marc       Reason for Sleep Consult:     Deirdre Zapata is sent by Kimberly Carrizales* for a sleep consultation regarding chronic fatigue.     Patient s Reason for visit  Deirdre Zapata main reason for visit: Waking up tired and daytime fatigue  Deirdre Zapata's goals for this visit: Come up with a game plan           Assessment and Plan:   1. Chronic fatigue  2. Parasomnia, unspecified type  3. Anxiety and depression    Patient presents to clinic today for evalaution of an approximate 1 year history of daytime fatigue despite adequate sleep time at night. Her Arthur Sleepiness Scale Score is in the normal range today indicating no abnormal sleepiness. Sleep history is somewhat unknown as she sleeps alone. Denies classic sx of sleep apnea such as snoring, gasping, choking, witnessed apneas in sleep but of note did have sleep apnea as a child, s/p T&A. Friends have reported to her that she will both kick her legs and \"wack\" her arms in her sleep raising " concern for possible dream enactment or PLMD. Agreed to pursue in lab polysomnography to assess if there is a sleep disrupting disorder contributing to her fatigue and sleep inertia in the morning. Anxiety/depression, ARUN, suspected fibromyaglia could all be alternative explanations of fatigue.   - Comprehensive Sleep Study; Future    Follow-up 1-2 weeks after sleep study for discussion of results.          History of Present Illness:     Deirdre Zapata is a 21 year old female with asthma, allergies, anxiety, depression, ARUN, PCOS, suspected fibromyalgia who presents to clinic today for evaluation of chronic fatigue. Reports increased fatigue over the past 1 year or so. Finds it hard to wake up in the morning. Also recent night sweats, sometimes associated with nightmare and sometimes not.     SLEEP-WAKE SCHEDULE:     Work/School Days: Patient goes to school/work: Yes   Usually gets into bed at 9:30PM  Takes patient about 30 min to fall asleep  Has trouble falling asleep 5 nights per week  Wakes up in the middle of the night 4 times.  Wakes up due to External stimuli (bed partner, pets, noise, etc), Anxiety, Nightmares, Uncertain  She has trouble falling back asleep 2 times a week.   It usually takes 10 min to get back to sleep  Patient is usually up at 7:00AM  Uses alarm: Yes    Weekends/Non-work Days/All Other Days:  Usually gets into bed at 10:00PM  Takes patient about 30 min to fall asleep  Patient is usually up at 9:00AM  Uses alarm: Yes    Patient often feels worse on weekends when sleep time increased.     Sleep Need  Patient gets 9 hours sleep on average   Patient thinks she needs about 8 hours sleep    Deirdre Zapata prefers to sleep in this position(s): Back, Side, Head Elevated   Patient states they do the following activities in bed: Watch TV, Use phone, computer, or tablet    Naps  Patient takes a purposeful nap 1 time a week and naps are usually a couple hours in duration  She feels better  "after a nap: No  She dozes off unintentionally 0 days per week  Patient has had a driving accident or near-miss due to sleepiness/drowsiness: No  She had a total Alto Sleepiness Scale of 8 (05/14/22 0802), with scores of 10 or higher indicating abnormal levels of sleepiness.    SLEEP DISRUPTIONS:    Breathing/Snoring  Patient snores:No  Other people complain about her snoring: No  Patient has been told she stops breathing in her sleep: Yes - reports history of sleep apnea in childhood, s/p T&A  She has issues with the following: Morning headaches couple times per month, Morning mouth dryness, Stuffy nose when you wake up     Movement:  Patient gets pain, discomfort, with an urge to move:  No  It happens when she is resting:  No  It happens more at night:  No  Patient has been told she kicks her legs at night:  Yes - \"sometimes when I am sleeping with someone like at a friends house they say I wack them or kick them and I have no idea\". Reports she has done this since childhood.      Behaviors in Sleep:  Deirdre Zapata has experienced the following behaviors while sleeping:     Recurring Nightmares - \"most of the time it's about loosing my teeth and my teeth falling out\", estimates occurance once per month    Sleep-talking - has been told she mumbles in her sleep     Teeth grinding - has mouthguard     Kicking or punching - see above     See or hear things that are not really there upon awakening or just falling asleep - \"I see shadows when it's dark or I think I see a shadow in the distance but it's not really there when I actually open my eyes more\". No auditory hallucinations. Estimates she sees shadows couple times per month.    She has experienced sudden muscle weakness during the day: No    Is there anything else you would like your sleep provider to know: I have tried getting different amounts of sleep ranging 7-10 hours a night    CAFFEINE AND OTHER SUBSTANCES:    Patient consumes caffeinated " beverages per day:  1  Last caffeine use is usually: 10 am  List of any prescribed or over the counter stimulants that patient takes: NA  List of any prescribed or over the counter sleep medication patient takes: NA  List of previous sleep medications that patient has tried: Melatonin  Patient drinks alcohol to help them sleep: No  Patient drinks alcohol near bedtime: No    Family History:  Patient has a family member been diagnosed with a sleep disorder: No      SCALES:    EPWORTH SLEEPINESS SCALE      Dingess Sleepiness Scale ( JEFF Bolton  1990-1997[facility] [place]/English - Final version - 21 Nov 07 - [facility] [place].) 5/14/2022   Sitting and reading Slight chance of dozing   Watching TV Moderate chance of dozing   Sitting, inactive in a public place (e.g. a theatre or a meeting) Would never doze   As a passenger in a car for an hour without a break Moderate chance of dozing   Lying down to rest in the afternoon when circumstances permit High chance of dozing   Sitting and talking to someone Would never doze   Sitting quietly after a lunch without alcohol Would never doze   In a car, while stopped for a few minutes in traffic Would never doze   Dingess Score (MC) 0   Dingess Score (Sleep) 8       INSOMNIA SEVERITY INDEX (MONSE)      Insomnia Severity Index (MONSE) 5/14/2022   Difficulty falling asleep 2   Difficulty staying asleep 2   Problems waking up too early 2   How SATISFIED/DISSATISFIED are you with your CURRENT sleep pattern? 3   How NOTICEABLE to others do you think your sleep problem is in terms of impairing the quality of your life? 3   How WORRIED/DISTRESSED are you about your current sleep problem? 3   To what extent do you consider your sleep problem to INTERFERE with your daily functioning (e.g. daytime fatigue, mood, ability to function at work/daily chores, concentration, memory, mood, etc.) CURRENTLY? 4   MONSE Total Score 19       Guidelines for Scoring/Interpretation:  Total score categories:  0-7  = No clinically significant insomnia   8-14 = Subthreshold insomnia   15-21 = Clinical insomnia (moderate severity)  22-28 = Clinical insomnia (severe)  Used via courtesy of www.myhealth.va.gov with permission from Ron Condon PhD., [facility]      Allergies:    Allergies   Allergen Reactions     Other Environmental Allergy Hives and Itching     Augmentin GI Disturbance     Cats      Morphine Nausea and Vomiting     Dog Epithelium Hives and Itching       Medications:    Current Outpatient Medications   Medication Sig Dispense Refill     albuterol (PROAIR HFA/PROVENTIL HFA/VENTOLIN HFA) 108 (90 Base) MCG/ACT inhaler INHALE 2 PUFFS BY MOUTH EVERY 4 HOURS AS NEEDED ONE-HALF  HOUR  BEFORE  EXERCISE       B Complex Vitamins (VITAMIN-B COMPLEX PO)        cetirizine (ZYRTEC) 5 MG tablet Take 5 mg by mouth daily       Cholecalciferol (VITAMIN D3 PO) Take 5,000 Units by mouth 2 times daily       Docusate Calcium (STOOL SOFTENER PO)        Ferrous Gluconate (IRON 27 PO)        hydrOXYzine (ATARAX) 25 MG tablet Take 1 tablet (25 mg) by mouth 3 times daily as needed for anxiety 30 tablet 0     MAGNESIUM GLYCINATE PO        sertraline (ZOLOFT) 100 MG tablet Take 1 tablet (100 mg) by mouth daily 90 tablet 1     triamcinolone (NASACORT) 55 MCG/ACT nasal aerosol Spray 2 sprays into both nostrils daily       VESTURA 3-0.02 MG tablet Take 1 tablet by mouth daily         Problem List:  Patient Active Problem List    Diagnosis Date Noted     PCOS (polycystic ovarian syndrome) 03/12/2022     Priority: Medium     Celiac disease 05/23/2019     Priority: Medium     Formatting of this note might be different from the original.  see blood tests and colonoscopy/endoscopy       Thyroid dysfunction 12/21/2017     Priority: Medium     Formatting of this note might be different from the original.  retest the TSH in 2 months, and if still normal, then 6-12 months later.       Mild intermittent asthma without complication 09/21/2016      Priority: Medium     Irregular menses 07/24/2015     Priority: Medium     Iron deficiency anemia due to chronic blood loss 10/08/2014     Priority: Medium     Vitamin D deficiency 10/08/2014     Priority: Medium     Allergic rhinitis due to animal hair and dander 06/16/2010     Priority: Medium     ACP (advance care planning) 11/17/2021     Priority: Low     Health Care Home 11/17/2021     Priority: Low        Past Medical/Surgical History:  No past medical history on file.  Past Surgical History:   Procedure Laterality Date     DENTAL SURGERY  2017     PODIATRY/FOOT & ANKLE SURGERY REFERRAL Bilateral 2014    Dr. Tompkins     TONSILLECTOMY, ADENOIDECTOMY, COMBINED  2002       Social History:  Social History     Socioeconomic History     Marital status: Single     Spouse name: Not on file     Number of children: Not on file     Years of education: Not on file     Highest education level: Not on file   Occupational History     Not on file   Tobacco Use     Smoking status: Never Smoker     Smokeless tobacco: Never Used   Substance and Sexual Activity     Alcohol use: Yes     Alcohol/week: 3.0 standard drinks     Types: 3 Standard drinks or equivalent per week     Comment: weekends     Drug use: Never     Sexual activity: Not Currently     Partners: Male   Other Topics Concern     Not on file   Social History Narrative     Not on file     Social Determinants of Health     Financial Resource Strain: Not on file   Food Insecurity: Not on file   Transportation Needs: Not on file   Physical Activity: Not on file   Stress: Not on file   Social Connections: Not on file   Intimate Partner Violence: Not on file   Housing Stability: Not on file       Family History:  Family History   Problem Relation Age of Onset     No Known Problems Mother      Hypothyroidism Father      Hypertension Father      Other - See Comments Sister         RSV     Asthma Sister      Breast Cancer Maternal Grandmother 60     Diabetes Type 2  Maternal  "Grandmother      Diabetes Type 2  Maternal Grandfather      Alzheimer Disease Maternal Grandfather      Colon Cancer Paternal Grandmother 70     Anxiety Disorder Paternal Grandmother      Alcoholism Paternal Grandfather        Review of Systems:  In the last TWO WEEKS have you experienced any of the following symptoms?  Fevers: No  Night Sweats: Yes  Weight Gain: No  Pain at Night: No  Double Vision: No  Changes in Vision: No  Difficulty Breathing through Nose: No  Sore Throat in Morning: Yes  Dry Mouth in the Morning: Yes  Shortness of Breath Lying Flat: No  Shortness of Breath With Activity: Yes  Awakening with Shortness of Breath: No  Increased Cough: No  Heart Racing at Night: Yes  Swelling in Feet or Legs: No  Diarrhea at Night: Yes  Heartburn at Night: No  Urinating More than Once at Night: Yes  Losing Control of Urine at Night: No  Joint Pains at Night: Yes  Headaches in Morning: Yes  Weakness in Arms or Legs: No  Depressed Mood: Yes  Anxiety: Yes    Physical Examination:  Vitals: Ht 1.619 m (5' 3.75\")   Wt 74.4 kg (164 lb)   LMP 05/01/2022 (Exact Date)   BMI 28.37 kg/m    BMI= Body mass index is 28.37 kg/m .  General appearance: Awake, alert, cooperative. Well groomed. Sitting comfortably in chair. In no apparent distress.  HEENT: Head: Normocephalic, atraumatic. Eyes:Conjunctiva clear. Sclera normal. Nose: External appearance without deformity.   Pulmonary:  Able to speak easily in full sentences. No cough or wheeze.   Skin:  No rashes or significant lesions on visible skin.   Neurologic: Alert, oriented x3.   Psychiatric: Mood euthymic. Affect congruent with full range and intensity.         Data: All pertinent previous laboratory data reviewed     No results for input(s): NA, POTASSIUM, CHLORIDE, CO2, ANIONGAP, GLC, BUN, CR, JENNIFER in the last 76773 hours.    Recent Labs   Lab Test 03/08/22  0902   WBC 10.3   RBC 4.31   HGB 12.6   HCT 38.7   MCV 89.9   MCH 29.2   MCHC 32.6   RDW 14.4          No " results for input(s): PROTTOTAL, ALBUMIN, BILITOTAL, ALKPHOS, AST, ALT, BILIDIRECT in the last 10978 hours.    No results found for: TSH    No results found for: UAMP, UBARB, BENZODIAZEUR, UCANN, UCOC, OPIT, UPCP    No results found for: IRONSAT, MN13512, TAINA    No results found for: PH, PHARTERIAL, PO2, [id number], SAT, PCO2, HCO3, BASEEXCESS, ANALILIA, BEB    @LABRCNTIPR(phv:4,pco2v:4,po2v:4,hco3v:4,moses:4,o2per:4)@    Echocardiology: No results found for this or any previous visit (from the past 4320 hour(s)).    Chest x-ray: No results found for this or any previous visit from the past 365 days.      Chest CT: No results found for this or any previous visit from the past 365 days.      PFT: Most Recent B[facility]ze Pulmonary Function Testing    No results found for: 20001  No results found for: 20002  No results found for: 20003  No results found for: 20015  No results found for: 20016  No results found for: 20027  No results found for: 20028  No results found for: 20029  No results found for: 20079  No results found for: 20080  No results found for: 20081  No results found for: 20335  No results found for: 20105  No results found for: 20053  No results found for: 20054  No results found for: 20055      Mitzi Flores PA-C 5/19/2022     [facility] [place]  [address] [address]     [facility]  [address] [address]    Chart documentation was completed, in part, with [employer] voice-recognition software. Even though reviewed, some grammatical, spelling, and word errors may remain.    46 minutes spent on day of encounter reviewing medical records, history and physical examination, review of previous testing and interpretation, documentation and further activities as noted above

## 2022-06-20 ENCOUNTER — OFFICE VISIT (OUTPATIENT)
Dept: FAMILY MEDICINE | Facility: CLINIC | Age: 22
End: 2022-06-20

## 2022-06-20 DIAGNOSIS — F41.1 GAD (GENERALIZED ANXIETY DISORDER): ICD-10-CM

## 2022-06-20 PROCEDURE — 99213 OFFICE O/P EST LOW 20 MIN: CPT | Mod: 95 | Performed by: PHYSICIAN ASSISTANT

## 2022-06-20 RX ORDER — SERTRALINE HYDROCHLORIDE 100 MG/1
200 TABLET, FILM COATED ORAL DAILY
Qty: 180 TABLET | Refills: 1 | Status: SHIPPED | OUTPATIENT
Start: 2022-06-20 | End: 2023-01-17

## 2022-06-20 NOTE — PROGRESS NOTES
Deirdre Zapata is a 22 year old female who is being evaluated via a billable video visit (audio/video synchronous).     Video Start Time: 12:24 PM          Originating Location (pt. Location): Other work    Distant Location (provider location): [facility] office      Subjective     Deirdre Zapata is a 22 year old female who presents today for the following health issues:    HPI     Appt with Dr. Renee 5/17 - restarted Zoloft   Advised therapy    Pt doing better - no AE  Hasn't found provider available when she can meet or is in network  Will schedule therapy    Hydroxyzine prn - using now once a week.     PHQ-9 SCORE 4/29/2022 5/17/2022 6/20/2022   PHQ-9 Total Score MyChart - - 6 (Mild depression)   PHQ-9 Total Score 6 7 6         JERRY-7 SCORE 4/29/2022 5/17/2022 6/20/2022   Total Score - - 6 (mild anxiety)   Total Score 8 11 6           Referred for sleep consult - appt on 5/19  Referred for sleep study - none is covered by insurance.         Patient Active Problem List   Diagnosis     Iron deficiency anemia due to chronic blood loss     Allergic rhinitis due to animal hair and dander     Celiac disease     Mild intermittent asthma without complication     Irregular menses     Thyroid dysfunction     Vitamin D deficiency     ACP (advance care planning)     Health Care Home     PCOS (polycystic ovarian syndrome)     Past Surgical History:   Procedure Laterality Date     DENTAL SURGERY  2017     PODIATRY/FOOT & ANKLE SURGERY REFERRAL Bilateral 2014    Dr. Tompkins     TONSILLECTOMY, ADENOIDECTOMY, COMBINED  2002       Social History     Tobacco Use     Smoking status: Never Smoker     Smokeless tobacco: Never Used   Substance Use Topics     Alcohol use: Yes     Alcohol/week: 3.0 standard drinks     Types: 3 Standard drinks or equivalent per week     Comment: weekends     Family History   Problem Relation Age of Onset     No Known Problems Mother      Hypothyroidism Father      Hypertension  Father      Other - See Comments Sister         RSV     Asthma Sister      Breast Cancer Maternal Grandmother 60     Diabetes Type 2  Maternal Grandmother      Diabetes Type 2  Maternal Grandfather      Alzheimer Disease Maternal Grandfather      Colon Cancer Paternal Grandmother 70     Anxiety Disorder Paternal Grandmother      Alcoholism Paternal Grandfather            Current Outpatient Medications   Medication Sig Dispense Refill     albuterol (PROAIR HFA/PROVENTIL HFA/VENTOLIN HFA) 108 (90 Base) MCG/ACT inhaler INHALE 2 PUFFS BY MOUTH EVERY 4 HOURS AS NEEDED ONE-HALF  HOUR  BEFORE  EXERCISE       B Complex Vitamins (VITAMIN-B COMPLEX PO)        cetirizine (ZYRTEC) 5 MG tablet Take 5 mg by mouth daily       Cholecalciferol (VITAMIN D3 PO) Take 5,000 Units by mouth 2 times daily       Docusate Calcium (STOOL SOFTENER PO)        Ferrous Gluconate (IRON 27 PO)        hydrOXYzine (ATARAX) 25 MG tablet Take 1 tablet (25 mg) by mouth 3 times daily as needed for anxiety 30 tablet 0     MAGNESIUM GLYCINATE PO        Probiotic Product (PROBIOTIC-10 PO)        sertraline (ZOLOFT) 100 MG tablet Take 2 tablets (200 mg) by mouth daily 180 tablet 1     triamcinolone (NASACORT) 55 MCG/ACT nasal aerosol Spray 2 sprays into both nostrils daily       VESTURA 3-0.02 MG tablet Take 1 tablet by mouth daily       Allergies   Allergen Reactions     Other Environmental Allergy Hives and Itching     POLLEN, TREES     Augmentin GI Disturbance     Cats      Gluten Meal      Morphine Nausea and Vomiting     Dog Epithelium Hives and Itching     No lab results found.     BP Readings from Last 3 Encounters:   05/17/22 104/62   04/29/22 108/68   03/08/22 108/64    Wt Readings from Last 3 Encounters:   05/19/22 74.4 kg (164 lb)   05/17/22 74.4 kg (164 lb)   04/29/22 75.3 kg (166 lb)                     Objective    LMP 05/01/2022 (Exact Date)   Estimated body mass index is 28.37 kg/m  as calculated from the following:    Height as of 5/19/22:  "1.619 m (5' 3.75\").    Weight as of 5/19/22: 74.4 kg (164 lb).  Physical Exam       GENERAL: Healthy, alert and no distress  EYES: Eyes grossly normal to inspection.  No discharge or erythema, or obvious scleral/conjunctival abnormalities.  RESP: No audible wheeze, cough, or visible cyanosis.  No visible retractions or increased work of breathing.    SKIN: Visible skin clear. No significant rash, abnormal pigmentation or lesions.  NEURO: Cranial nerves grossly intact.  Mentation and speech appropriate for age.  PSYCH: Mentation appears normal, affect normal/bright, judgement and insight intact, normal speech and appearance well-groomed.              Assessment & Plan     (F41.1) JERRY (generalized anxiety disorder)  Comment: stable  Plan: sertraline (ZOLOFT) 100 MG tablet    Controlled.   Continue current medication(s) at current dose(s).  Schedule therapy         FUTURE APPOINTMENTS:       - Follow-up visit in 6 months        CHELSY Garrett  [facility]        Video-Visit Details    Type of service:  Video Visit (audio/video)    Video End Time (time video stopped): 12:31 PM      Mode of Communication:  Doximity           "

## 2022-06-20 NOTE — NURSING NOTE
Chief Complaint   Patient presents with     Recheck Medication     Anxiety     DOXIMITY anxiety med check         Pre-visit Screening:  Immunizations:  up to date  Colonoscopy:  NA  Mammogram: NA  Asthma Action Test/Plan:  NA  PHQ9:  Done via [employer] today  GAD7:  Done via [employer] today  Questioned patient about current smoking habits Pt. has never smoked.  Ok to leave detailed message on voice mail for today's visit only Yes, phone # [phone]

## 2022-07-15 ENCOUNTER — OFFICE VISIT (OUTPATIENT)
Dept: FAMILY MEDICINE | Facility: CLINIC | Age: 22
End: 2022-07-15

## 2022-07-15 VITALS
HEART RATE: 64 BPM | RESPIRATION RATE: 20 BRPM | BODY MASS INDEX: 26.77 KG/M2 | TEMPERATURE: 97.2 F | WEIGHT: 156.8 LBS | DIASTOLIC BLOOD PRESSURE: 64 MMHG | SYSTOLIC BLOOD PRESSURE: 102 MMHG | HEIGHT: 64 IN

## 2022-07-15 DIAGNOSIS — R10.32 ABDOMINAL PAIN, LEFT LOWER QUADRANT: Primary | ICD-10-CM

## 2022-07-15 DIAGNOSIS — R19.8 ALTERED BOWEL FUNCTION: ICD-10-CM

## 2022-07-15 DIAGNOSIS — R10.31 ABDOMINAL PAIN, RIGHT LOWER QUADRANT: ICD-10-CM

## 2022-07-15 LAB
ERYTHROCYTE [DISTWIDTH] IN BLOOD BY AUTOMATED COUNT: 12.2 %
HCT VFR BLD AUTO: 37.3 % (ref 35–47)
HEMOGLOBIN: 12 G/DL (ref 11.7–15.7)
MCH RBC QN AUTO: 27.4 PG (ref 26–33)
MCHC RBC AUTO-ENTMCNC: 32.2 G/DL (ref 31–36)
MCV RBC AUTO: 88.4 FL (ref 78–100)
PLATELET COUNT - QUEST: 305 10^9/L (ref 150–375)
RBC # BLD AUTO: 4.22 10*12/L (ref 3.8–5.2)
WBC # BLD AUTO: 8.7 10*9/L (ref 4–11)

## 2022-07-15 PROCEDURE — 99213 OFFICE O/P EST LOW 20 MIN: CPT | Performed by: PHYSICIAN ASSISTANT

## 2022-07-15 PROCEDURE — 85027 COMPLETE CBC AUTOMATED: CPT | Performed by: PHYSICIAN ASSISTANT

## 2022-07-15 PROCEDURE — 74018 RADEX ABDOMEN 1 VIEW: CPT | Performed by: PHYSICIAN ASSISTANT

## 2022-07-15 PROCEDURE — 36415 COLL VENOUS BLD VENIPUNCTURE: CPT | Performed by: PHYSICIAN ASSISTANT

## 2022-07-15 NOTE — PROGRESS NOTES
Assessment & Plan     1. Abdominal pain, left lower quadrant    2. Abdominal pain, right lower quadrant    3. Altered bowel function      Milk of Magnesia - follow instructions on bottle - take max dose - up to 3 days in a row.  Inc water - 60 oz daily - do this ongoing  Fiber in diet - 25-35 g fiber daily  Add 1/2-1 cap Miralax temporarily (up to 4 weeks)  Will refer for consult with Gastroenterologist        CHELSY Garrett  [facility]    Subjective     Nursing Notes:   Elva Dorantes, [facility]  7/15/2022 11:40 AM  Signed  Deirdre Zapata is here for on and off bloody stools. Will have cramping sometimes before stools.    Questioned patient about current smoking habits.  Pt. has never smoked.  PULSE regular  My Chart: active  CLASSIFICATION OF OVERWEIGHT AND OBESITY BY BMI                        Obesity Class           BMI(kg/m2)  Underweight                                    < 18.5  Normal                                         18.5-24.9  Overweight                                     25.0-29.9  OBESITY                     I                  30.0-34.9                             II                 35.0-39.9  EXTREME OBESITY             III                >40                            Patient's  BMI Body mass index is 27.13 kg/m .  http://hin.nhlbi.nih.gov/menuplanner/menu.cgi  Pre-visit planning  Immunizations - up to date  Colonoscopy -   Mammogram -   Asthma -   PHQ9 -    JERRY-7 -              Deirdre Zapata is a 22 year old female who presents to clinic today for the following health issues:     HPI     Lower abd pain and cramping in the last few weeks. Pain described as sharp pain.  Typically before BM, not always.   No nausea, no vomiting.  Last Monday did notice blood in the stool, large BM, more pain. A couple drops of blood in the stool and on the toilet paper.    BM are irregular - usually constipation, sometimes diarrhea.    Mirilax and stool softeners  "OT    Family: PGM Colon Cancer at 70      Personal Hx of colitis when she was 15 years old.     CT showed colon was inflamed    Saw ovarian cysts.     Fiber intake - flax seed  Drinking a lot of water    Colonoscopy 2019 at [facility]  Upper GI and Colonoscopy           Review of Systems     CONSTITUTIONAL: NEGATIVE for fever, chills, change in weight  EYES: NEGATIVE for vision changes or irritation  ENT/MOUTH: NEGATIVE for ear, mouth and throat problems  RESP: NEGATIVE for significant cough or SOB  CV: NEGATIVE for chest pain, palpitations or peripheral edema  : NEGATIVE for frequency, dysuria, or hematuria        Objective    /64 (BP Location: Left arm, Patient Position: Chair, Cuff Size: Adult Regular)   Pulse 64   Temp 97.2  F (36.2  C) (Temporal)   Resp 20   Ht 1.619 m (5' 3.75\")   Wt 71.1 kg (156 lb 12.8 oz)   BMI 27.13 kg/m    Body mass index is 27.13 kg/m .  Physical Exam   GENERAL: healthy, alert and no distress  RESP: lungs clear to auscultation - no rales, rhonchi or wheezes  CV: regular rate and rhythm, normal S1 S2, no S3 or S4, no murmur, click or rub, no peripheral edema and peripheral pulses strong  Abd: mild tenderness bilateral lower abd. No guarding or rebound tenderness  MS: no gross musculoskeletal defects noted, no edema  Rectal: Fissure at 6 o'clock No hemorrhoids  JENELLE neg for mass    Xray: mod stool in colon        "

## 2022-07-15 NOTE — NURSING NOTE
Deirdre Zapata is here for on and off bloody stools. Will have cramping sometimes before stools.    Questioned patient about current smoking habits.  Pt. has never smoked.  PULSE regular  My Chart: active  CLASSIFICATION OF OVERWEIGHT AND OBESITY BY BMI                        Obesity Class           BMI(kg/m2)  Underweight                                    < 18.5  Normal                                         18.5-24.9  Overweight                                     25.0-29.9  OBESITY                     I                  30.0-34.9                             II                 35.0-39.9  EXTREME OBESITY             III                >40                            Patient's  BMI Body mass index is 27.13 kg/m .  http://hin.nhlbi.nih.gov/menuplanner/menu.cgi  Pre-visit planning  Immunizations - up to date  Colonoscopy -   Mammogram -   Asthma -   PHQ9 -    JERRY-7 -

## 2022-07-27 ENCOUNTER — TRANSFERRED RECORDS (OUTPATIENT)
Dept: FAMILY MEDICINE | Facility: CLINIC | Age: 22
End: 2022-07-27

## 2022-07-29 ENCOUNTER — MYC MEDICAL ADVICE (OUTPATIENT)
Dept: FAMILY MEDICINE | Facility: CLINIC | Age: 22
End: 2022-07-29

## 2022-08-11 ENCOUNTER — TRANSFERRED RECORDS (OUTPATIENT)
Dept: FAMILY MEDICINE | Facility: CLINIC | Age: 22
End: 2022-08-11

## 2022-10-01 ENCOUNTER — HEALTH MAINTENANCE LETTER (OUTPATIENT)
Age: 22
End: 2022-10-01

## 2022-10-24 NOTE — PATIENT INSTRUCTIONS
Preventive Health Recommendations  Female Ages 21 to 25     Yearly exam:     See your health care provider every year in order to  o Review health changes.   o Discuss preventive care.    o Review your medicines if your doctor has prescribed any.      You should be tested each year for STDs (sexually transmitted diseases).       Talk to your provider about how often you should have cholesterol testing.      Get a Pap test every three years. If you have an abnormal result, your doctor may have you test more often.      If you are at risk for diabetes, you should have a diabetes test (fasting glucose).     Shots:     Get a flu shot each year.     Get a tetanus shot every 10 years.     Consider getting the shot (vaccine) that prevents cervical cancer (Gardasil).    Nutrition:     Eat at least 5 servings of fruits and vegetables each day.    Eat whole-grain bread, whole-wheat pasta and brown rice instead of white grains and rice.    Get adequate Calcium and Vitamin D.     Lifestyle    Exercise at least 150 minutes a week each week (30 minutes a day, 5 days a week). This will help you control your weight and prevent disease.    Limit alcohol to one drink per day.    No smoking.     Wear sunscreen to prevent skin cancer.    See your dentist every six months for an exam and cleaning.  
English

## 2022-11-24 ENCOUNTER — MYC MEDICAL ADVICE (OUTPATIENT)
Dept: FAMILY MEDICINE | Facility: CLINIC | Age: 22
End: 2022-11-24

## 2022-12-02 ENCOUNTER — MYC MEDICAL ADVICE (OUTPATIENT)
Dept: FAMILY MEDICINE | Facility: CLINIC | Age: 22
End: 2022-12-02

## 2022-12-06 ENCOUNTER — OFFICE VISIT (OUTPATIENT)
Dept: FAMILY MEDICINE | Facility: CLINIC | Age: 22
End: 2022-12-06

## 2022-12-06 VITALS
TEMPERATURE: 97.3 F | BODY MASS INDEX: 25.61 KG/M2 | SYSTOLIC BLOOD PRESSURE: 100 MMHG | WEIGHT: 150 LBS | HEIGHT: 64 IN | DIASTOLIC BLOOD PRESSURE: 64 MMHG | HEART RATE: 87 BPM | OXYGEN SATURATION: 97 %

## 2022-12-06 DIAGNOSIS — J06.9 VIRAL URI: Primary | ICD-10-CM

## 2022-12-06 DIAGNOSIS — L73.1 INGROWN HAIR: ICD-10-CM

## 2022-12-06 DIAGNOSIS — R07.0 THROAT PAIN: ICD-10-CM

## 2022-12-06 LAB
COVID-19: NEGATIVE
FLUAV AG UPPER RESP QL IA.RAPID: NORMAL
FLUBV AG UPPER RESP QL IA.RAPID: NORMAL
STREP A: NEGATIVE

## 2022-12-06 PROCEDURE — G2023 SPECIMEN COLLECT COVID-19: HCPCS | Performed by: PHYSICIAN ASSISTANT

## 2022-12-06 PROCEDURE — 87804 INFLUENZA ASSAY W/OPTIC: CPT | Performed by: PHYSICIAN ASSISTANT

## 2022-12-06 PROCEDURE — 87635 SARS-COV-2 COVID-19 AMP PRB: CPT | Performed by: PHYSICIAN ASSISTANT

## 2022-12-06 PROCEDURE — 99214 OFFICE O/P EST MOD 30 MIN: CPT | Performed by: PHYSICIAN ASSISTANT

## 2022-12-06 PROCEDURE — 87651 STREP A DNA AMP PROBE: CPT | Performed by: PHYSICIAN ASSISTANT

## 2022-12-06 RX ORDER — CLOBETASOL PROPIONATE 0.5 MG/G
CREAM TOPICAL 2 TIMES DAILY
Qty: 30 G | Refills: 0 | Status: SHIPPED | OUTPATIENT
Start: 2022-12-06 | End: 2023-01-17

## 2022-12-06 ASSESSMENT — ASTHMA QUESTIONNAIRES: ACT_TOTALSCORE: 22

## 2022-12-06 NOTE — NURSING NOTE
Chief Complaint   Patient presents with     Derm Problem     Sores on her lower legs that look like pimples and eventually scab/scar. More are appearing.     Throat Pain     Sore throat started 3 days ago     Headache     Headache started 4 days ago         Pre-visit Screening:  Immunizations:  up to date  Colonoscopy:  NA  Mammogram: NA  Asthma Action Test/Plan:  Yes  PHQ9:  NA  GAD7:  NA  Questioned patient about current smoking habits Pt. has never smoked.  Ok to leave detailed message on voice mail for today's visit only Yes, phone # [phone]

## 2022-12-06 NOTE — PROGRESS NOTES
"Assessment & Plan     Viral URI  Continue OTC tx  WTC with any worsening sx.    Throat pain    - Strep A (BFP)  - Influenza A and B (BFP)  - COVID-19 (BFP)    Ingrown hair  Apply bid steroid cream for up to 14 days  Derm consult if not improving  - clobetasol (TEMOVATE) 0.05 % external cream  Dispense: 30 g; Refill: 0  - Adult Dermatology Referral          I have advised to this patient that I will be leaving [facility] end of the year. I have suggested  he/she est. Care with one of our providers in follow up.         CHELSY Garrett  [facility]    Subjective     Nursing Notes:   Emy Hodges  12/6/2022  9:56 AM  Signed  Chief Complaint   Patient presents with     Derm Problem     Sores on her lower legs that look like pimples and eventually scab/scar. More are appearing.     Throat Pain     Sore throat started 3 days ago     Headache     Headache started 4 days ago         Pre-visit Screening:  Immunizations:  up to date  Colonoscopy:  NA  Mammogram: NA  Asthma Action Test/Plan:  Yes  PHQ9:  NA  GAD7:  NA  Questioned patient about current smoking habits Pt. has never smoked.  Ok to leave detailed message on voice mail for today's visit only Yes, phone # [phone]                Deirdre Zapata is a 22 year old female who presents to clinic today for the following health issues:     HPI     1. Sores on the legs - intermittently for acouple of months.   Right lower       2. Saturday dance comp - headache  No sensitivity to light/sound  Exposed to influenza and strep    Sunday ok - fatigue     Monday - throat pain  Harder to swallow eat/sleep    OTC: Dayquil/Nyquil/ibuprofen  Mucinex    Tonsils removed when she was 1.     Slight SOB.   Intermittent asthma  Typically allergies.         Objective    /64 (BP Location: Right arm, Patient Position: Sitting, Cuff Size: Adult Large)   Pulse 87   Temp 97.3  F (36.3  C) (Temporal)   Ht 1.619 m (5' 3.75\")   Wt " 68 kg (150 lb)   SpO2 97%   BMI 25.95 kg/m    Body mass index is 25.95 kg/m .  Physical Exam   GENERAL: healthy, alert and no distress  EYES: Eyes grossly normal to inspection, PERRL and conjunctivae and sclerae normal  HENT: ear canals and TM's normal, nose and mouth without ulcers or lesions  NECK: no adenopathy, no asymmetry, masses, or scars and thyroid normal to palpation  RESP: lungs clear to auscultation - no rales, rhonchi or wheezes  CV: regular rate and rhythm, normal S1 S2, no S3 or S4, no murmur, click or rub, no peripheral edema and peripheral pulses strong  MS: no gross musculoskeletal defects noted, no edema    Skin: right inner lower leg 3 papules around  Ingrown hair

## 2023-01-05 DIAGNOSIS — F41.1 GAD (GENERALIZED ANXIETY DISORDER): ICD-10-CM

## 2023-01-05 RX ORDER — SERTRALINE HYDROCHLORIDE 100 MG/1
TABLET, FILM COATED ORAL
Qty: 180 TABLET | Refills: 1 | COMMUNITY
Start: 2023-01-05

## 2023-01-05 NOTE — TELEPHONE ENCOUNTER
Deirdre Zapata is requesting a refill of:    Refused Prescriptions:                       Disp   Refills    sertraline (ZOLOFT) 100 MG tablet [Pharmac*180 ta*1        Sig: TAKE 2 TABLETS BY MOUTH EVERY DAY  Refused By: RAMONE GARCIA  Reason for Refusal: Patient needs appointment    Last med recheck was 06/20/22 advised to return in 6 months. Pt due for OV

## 2023-01-17 ENCOUNTER — OFFICE VISIT (OUTPATIENT)
Dept: FAMILY MEDICINE | Facility: CLINIC | Age: 23
End: 2023-01-17

## 2023-01-17 VITALS
WEIGHT: 152 LBS | DIASTOLIC BLOOD PRESSURE: 74 MMHG | HEART RATE: 94 BPM | TEMPERATURE: 98 F | SYSTOLIC BLOOD PRESSURE: 102 MMHG | OXYGEN SATURATION: 98 % | BODY MASS INDEX: 26.3 KG/M2 | RESPIRATION RATE: 20 BRPM

## 2023-01-17 DIAGNOSIS — F41.1 GAD (GENERALIZED ANXIETY DISORDER): ICD-10-CM

## 2023-01-17 PROCEDURE — 99213 OFFICE O/P EST LOW 20 MIN: CPT | Performed by: STUDENT IN AN ORGANIZED HEALTH CARE EDUCATION/TRAINING PROGRAM

## 2023-01-17 RX ORDER — SERTRALINE HYDROCHLORIDE 100 MG/1
200 TABLET, FILM COATED ORAL DAILY
Qty: 180 TABLET | Refills: 1 | Status: SHIPPED | OUTPATIENT
Start: 2023-01-17 | End: 2023-08-07

## 2023-01-17 ASSESSMENT — ANXIETY QUESTIONNAIRES
5. BEING SO RESTLESS THAT IT IS HARD TO SIT STILL: NOT AT ALL
1. FEELING NERVOUS, ANXIOUS, OR ON EDGE: NOT AT ALL
IF YOU CHECKED OFF ANY PROBLEMS ON THIS QUESTIONNAIRE, HOW DIFFICULT HAVE THESE PROBLEMS MADE IT FOR YOU TO DO YOUR WORK, TAKE CARE OF THINGS AT HOME, OR GET ALONG WITH OTHER PEOPLE: SOMEWHAT DIFFICULT
6. BECOMING EASILY ANNOYED OR IRRITABLE: SEVERAL DAYS
2. NOT BEING ABLE TO STOP OR CONTROL WORRYING: NOT AT ALL
7. FEELING AFRAID AS IF SOMETHING AWFUL MIGHT HAPPEN: NOT AT ALL
GAD7 TOTAL SCORE: 2
GAD7 TOTAL SCORE: 2
3. WORRYING TOO MUCH ABOUT DIFFERENT THINGS: NOT AT ALL

## 2023-01-17 ASSESSMENT — PATIENT HEALTH QUESTIONNAIRE - PHQ9
SUM OF ALL RESPONSES TO PHQ QUESTIONS 1-9: 5
5. POOR APPETITE OR OVEREATING: SEVERAL DAYS

## 2023-01-17 NOTE — PROGRESS NOTES
Assessment & Plan       ICD-10-CM    1. JERRY (generalized anxiety disorder)  F41.1 sertraline (ZOLOFT) 100 MG tablet         Doing well, continue current dose. Encouraged to continue efforts to establish with therapist.     Patient Instructions   Continue sertraline    [facility]    Follow-up in 6 months, sooner if needed        Rickey Renee MD, [facility]       Subjective     Deirdre Zapata is a 22 year old female who presents to clinic today for the following health issues:    HPI   Chief Complaint   Patient presents with     Recheck Medication     Non fasting medication check and review for sertraline, feels that it is starting to work well and that this is the right dosing for her       Depression and Anxiety Follow-Up - tolerating 200mg sertraline well    How are you doing with your depression since your last visit? Better but more noticeable as anxiety lessens    How are you doing with your anxiety since your last visit?  Improved a lot, no more panic attacks    Are you having other symptoms that might be associated with depression or anxiety? No, GI sx better recently.     Have you had a significant life event? No     Do you have any concerns with your use of alcohol or other drugs? No    Social History     Tobacco Use     Smoking status: Never     Smokeless tobacco: Never   Substance Use Topics     Alcohol use: Yes     Alcohol/week: 3.0 standard drinks     Types: 3 Standard drinks or equivalent per week     Comment: weekends     Drug use: Never     PHQ 5/17/2022 6/20/2022 1/17/2023   PHQ-9 Total Score 7 6 5   Q9: Thoughts of better off dead/self-harm past 2 weeks Not at all Not at all Not at all     JERRY-7 SCORE 5/17/2022 6/20/2022 1/17/2023   Total Score - 6 (mild anxiety) -   Total Score 11 6 2             Objective    /74 (BP Location: Left arm, Patient Position: Sitting, Cuff Size: Adult Large)   Pulse 94   Temp 98  F (36.7  C) (Temporal)   Resp 20   Wt 68.9  kg (152 lb)   SpO2 98%   BMI 26.30 kg/m    Body mass index is 26.3 kg/m .  Alert, NAD  NC/AT  Sclerae anicteric  Regular  Resp nonlabored  Skin warm and dry  No focal neuro deficits. Speech intact.   Appropriate affect  Normal gait.

## 2023-01-17 NOTE — NURSING NOTE
Chief Complaint   Patient presents with     Recheck Medication     Non fasting medication check and review for sertraline, feels that it is starting to work well and that this is the right dosing for her      Pre-visit Screening:  Immunizations:  up to date  Colonoscopy:  NA  Mammogram: NA  Asthma Action Test/Plan:  NA  PHQ9:  Given today   GAD7:  Given today   Questioned patient about current smoking habits Pt. has never smoked.  Ok to leave detailed message on voice mail for today's visit only Yes, phone # [phone]

## 2023-02-09 ENCOUNTER — OFFICE VISIT (OUTPATIENT)
Dept: FAMILY MEDICINE | Facility: CLINIC | Age: 23
End: 2023-02-09

## 2023-02-09 VITALS
WEIGHT: 150.2 LBS | DIASTOLIC BLOOD PRESSURE: 64 MMHG | HEART RATE: 80 BPM | BODY MASS INDEX: 25.64 KG/M2 | HEIGHT: 64 IN | SYSTOLIC BLOOD PRESSURE: 98 MMHG | TEMPERATURE: 97.1 F | RESPIRATION RATE: 20 BRPM

## 2023-02-09 DIAGNOSIS — K90.0 CELIAC DISEASE: ICD-10-CM

## 2023-02-09 DIAGNOSIS — K52.9 GASTROENTERITIS: ICD-10-CM

## 2023-02-09 DIAGNOSIS — R82.79 ABNORMAL FINDINGS ON MICROBIOLOGICAL EXAMINATION OF URINE: ICD-10-CM

## 2023-02-09 DIAGNOSIS — R23.3 EASY BRUISING: Primary | ICD-10-CM

## 2023-02-09 DIAGNOSIS — D50.0 IRON DEFICIENCY ANEMIA DUE TO CHRONIC BLOOD LOSS: ICD-10-CM

## 2023-02-09 LAB
% GRANULOCYTES: 67.2 %
ALBUMIN SERPL-MCNC: 3.7 G/DL (ref 3.6–5.1)
ALBUMIN/GLOB SERPL: 1.3 {RATIO} (ref 1–2.5)
ALP SERPL-CCNC: 49 U/L (ref 33–130)
ALT 1742-6: 10 U/L (ref 0–32)
APPEARANCE UR: ABNORMAL
AST 1920-8: 16 U/L (ref 0–35)
BACTERIA, UR: ABNORMAL
BILIRUB SERPL-MCNC: 0.4 MG/DL (ref 0.2–1.2)
BILIRUB UR QL: ABNORMAL
BUN SERPL-MCNC: 10 MG/DL (ref 7–25)
BUN/CREATININE RATIO: 11.8 (ref 6–22)
CALCIUM SERPL-MCNC: 8.9 MG/DL (ref 8.6–10.3)
CASTS/LPF: ABNORMAL
CHLORIDE SERPLBLD-SCNC: 105.4 MMOL/L (ref 98–110)
CO2 SERPL-SCNC: 28.7 MMOL/L (ref 20–32)
COLOR UR: YELLOW
CREAT SERPL-MCNC: 0.85 MG/DL (ref 0.6–1.3)
EP/HPF: ABNORMAL
GLOBULIN, CALCULATED - QUEST: 2.9 (ref 1.9–3.7)
GLUCOSE SERPL-MCNC: 79 MG/DL (ref 60–99)
GLUCOSE URINE: ABNORMAL MG/DL
HCT VFR BLD AUTO: 40.1 % (ref 35–47)
HEMOGLOBIN: 12.8 G/DL (ref 11.7–15.7)
HGB UR QL: ABNORMAL
INR POINT OF CARE: 1 (ref 0.9–1.1)
KETONES UR QL: 15 MG/DL
LYMPHOCYTES NFR BLD AUTO: 25.7 %
MCH RBC QN AUTO: 27.9 PG (ref 26–33)
MCHC RBC AUTO-ENTMCNC: 31.9 G/DL (ref 31–36)
MCV RBC AUTO: 87.5 FL (ref 78–100)
MISC.: ABNORMAL
MONOCYTES NFR BLD AUTO: 7.1 %
NITRITE UR QL STRIP: ABNORMAL
PH UR STRIP: 6 PH (ref 5–7)
PLATELET COUNT - QUEST: 271 10^9/L (ref 150–375)
POTASSIUM SERPL-SCNC: 4.74 MMOL/L (ref 3.5–5.3)
PROT SERPL-MCNC: 6.6 G/DL (ref 6.1–8.1)
PROT UR QL: ABNORMAL MG/DL
RBC # BLD AUTO: 4.58 10*12/L (ref 3.8–5.2)
RBC, UR MICRO: ABNORMAL (ref ?–2)
SODIUM SERPL-SCNC: 139.3 MMOL/L (ref 135–146)
SP GR UR STRIP: 1.02 (ref 1–1.03)
UROBILINOGEN UR QL STRIP: 0.2 EU/DL (ref 0.2–1)
WBC # BLD AUTO: 6.3 10*9/L (ref 4–11)
WBC #/AREA URNS HPF: ABNORMAL /[HPF]
WBC, UR MICRO: ABNORMAL (ref ?–2)

## 2023-02-09 PROCEDURE — 80053 COMPREHEN METABOLIC PANEL: CPT | Performed by: STUDENT IN AN ORGANIZED HEALTH CARE EDUCATION/TRAINING PROGRAM

## 2023-02-09 PROCEDURE — 85610 PROTHROMBIN TIME: CPT | Performed by: STUDENT IN AN ORGANIZED HEALTH CARE EDUCATION/TRAINING PROGRAM

## 2023-02-09 PROCEDURE — 99214 OFFICE O/P EST MOD 30 MIN: CPT | Performed by: STUDENT IN AN ORGANIZED HEALTH CARE EDUCATION/TRAINING PROGRAM

## 2023-02-09 PROCEDURE — 85025 COMPLETE CBC W/AUTO DIFF WBC: CPT | Performed by: STUDENT IN AN ORGANIZED HEALTH CARE EDUCATION/TRAINING PROGRAM

## 2023-02-09 PROCEDURE — 81001 URINALYSIS AUTO W/SCOPE: CPT | Performed by: STUDENT IN AN ORGANIZED HEALTH CARE EDUCATION/TRAINING PROGRAM

## 2023-02-09 PROCEDURE — 87086 URINE CULTURE/COLONY COUNT: CPT | Mod: 90 | Performed by: STUDENT IN AN ORGANIZED HEALTH CARE EDUCATION/TRAINING PROGRAM

## 2023-02-09 PROCEDURE — 87088 URINE BACTERIA CULTURE: CPT | Mod: 90 | Performed by: STUDENT IN AN ORGANIZED HEALTH CARE EDUCATION/TRAINING PROGRAM

## 2023-02-09 RX ORDER — ONDANSETRON 4 MG/1
4 TABLET, ORALLY DISINTEGRATING ORAL EVERY 6 HOURS PRN
Qty: 20 TABLET | Refills: 0 | Status: SHIPPED | OUTPATIENT
Start: 2023-02-09 | End: 2023-08-07

## 2023-02-09 NOTE — NURSING NOTE
Deirdre Zapata is here for bruising on Saturday and Tuesday night. Denied injury. First time that she has had kranthi bruising and no cause.  Also having nausea, vomiting and diarrhea since yesterday.    Questioned patient about current smoking habits.  Pt. has never smoked.  PULSE regular  My Chart: active  CLASSIFICATION OF OVERWEIGHT AND OBESITY BY BMI                        Obesity Class           BMI(kg/m2)  Underweight                                    < 18.5  Normal                                         18.5-24.9  Overweight                                     25.0-29.9  OBESITY                     I                  30.0-34.9                             II                 35.0-39.9  EXTREME OBESITY             III                >40                            Patient's  BMI Body mass index is 25.98 kg/m .  http://hin.nhlbi.nih.gov/menuplanner/menu.cgi  Pre-visit planning  Immunizations - up to date  Colonoscopy -   Mammogram -   Asthma -   PHQ9 -    JERRY-7 -

## 2023-02-09 NOTE — PROGRESS NOTES
Assessment & Plan       ICD-10-CM    1. Easy bruising  R23.3 HEMOGRAM PLATELET DIFF (BFP)     Comprehensive Metobolic Panel (BFP)     URINALYSIS, ROUTINE (BFP)     PROTHROMBIN TIME INR (BFP)      2. Gastroenteritis  K52.9 Comprehensive Metobolic Panel (BFP)     ondansetron (ZOFRAN ODT) 4 MG ODT tab      3. Celiac disease  K90.0       4. Iron deficiency anemia due to chronic blood loss  D50.0 HEMOGRAM PLATELET DIFF (BFP)      5. Abnormal findings on microbiological examination of urine  R82.79 URINE CULTURE AEROBIC BACTERIAL ([facility])           Reasons to follow-up sooner or seek emergent care reviewed.     >30 minutes spent on the date of the encounter doing chart review, history and exam, documentation and further activities per the note    Rickey Renee MD, [facility]       Subjective     Deirdre Zapata is a 22 year old female who presents to clinic today for the following health issues:    HPI   Nursing Notes:   Elva Dorantes, KATIE  2/9/2023  9:40 AM  Addendum  Deirdre Zapata is here for bruising on Saturday and Tuesday night. Denied injury. First time that she has had kranthi bruising and no cause.  Also having nausea, vomiting and diarrhea since yesterday.    Questioned patient about current smoking habits.  Pt. has never smoked.  PULSE regular  My Chart: active  CLASSIFICATION OF OVERWEIGHT AND OBESITY BY BMI                        Obesity Class           BMI(kg/m2)  Underweight                                    < 18.5  Normal                                         18.5-24.9  Overweight                                     25.0-29.9  OBESITY                     I                  30.0-34.9                             II                 35.0-39.9  EXTREME OBESITY             III                >40                            Patient's  BMI Body mass index is 25.98 kg/m .  http://hin.nhlbi.nih.gov/menuplanner/menu.cgi  Pre-visit planning  Immunizations - up to  "date  Colonoscopy -   Mammogram -   Asthma -   PHQ9 -    JERRY-7 -         No blood in stools or emesis. One instance of few drops of blood in urine, not menses. No dysuria.  Frequent nosebleeds that stop within a few minutes  Gums bleed occasionally     Family History   Problem Relation Age of Onset     No Known Problems Mother      Hypothyroidism Father      Hypertension Father      Other - See Comments Sister         RSV     Asthma Sister      Breast Cancer Maternal Grandmother 60     Diabetes Type 2  Maternal Grandmother      Diabetes Type 2  Maternal Grandfather      Alzheimer Disease Maternal Grandfather      Colon Cancer Paternal Grandmother 70     Anxiety Disorder Paternal Grandmother      Alcoholism Paternal Grandfather               Objective    BP 98/64 (BP Location: Left arm, Patient Position: Chair, Cuff Size: Adult Regular)   Pulse 80   Temp 97.1  F (36.2  C) (Temporal)   Resp 20   Ht 1.619 m (5' 3.75\")   Wt 68.1 kg (150 lb 3.2 oz)   BMI 25.98 kg/m    Body mass index is 25.98 kg/m .  Alert, NAD  NC/AT  Sclerae anicteric  RRR  Resp nonlabored  Skin warm and dry, pictures of prior bruises reviewed  No focal neuro deficits. Speech intact.   Appropriate affect  Normal gait.    Labs reviewed.         "

## 2023-02-11 LAB — URINE VOIDED CULTURE: NORMAL

## 2023-04-24 ENCOUNTER — OFFICE VISIT (OUTPATIENT)
Dept: FAMILY MEDICINE | Facility: CLINIC | Age: 23
End: 2023-04-24

## 2023-04-24 VITALS
DIASTOLIC BLOOD PRESSURE: 66 MMHG | HEART RATE: 76 BPM | HEIGHT: 64 IN | BODY MASS INDEX: 26.36 KG/M2 | SYSTOLIC BLOOD PRESSURE: 98 MMHG | OXYGEN SATURATION: 95 % | TEMPERATURE: 97.8 F | WEIGHT: 154.4 LBS

## 2023-04-24 DIAGNOSIS — J45.21 MILD INTERMITTENT ASTHMA WITH EXACERBATION: Primary | ICD-10-CM

## 2023-04-24 PROCEDURE — 99213 OFFICE O/P EST LOW 20 MIN: CPT | Performed by: PHYSICIAN ASSISTANT

## 2023-04-24 RX ORDER — PREDNISONE 20 MG/1
40 TABLET ORAL DAILY
Qty: 10 TABLET | Refills: 0 | Status: SHIPPED | OUTPATIENT
Start: 2023-04-24 | End: 2023-06-20

## 2023-04-24 NOTE — NURSING NOTE
Chief Complaint   Patient presents with     Asthma     Has cold like symptoms that are causing her asthma to flare-up, symptoms started Thursday, cough is painful in her chest, using albuterol inhaler often, wheezing and SOB at times     Pre-visit Screening:  Immunizations:  up to date  Colonoscopy:  NA  Mammogram: NA  Asthma Action Test/Plan:  NA  PHQ9:  NA  GAD7:  NA  Questioned patient about current smoking habits Pt. has never smoked.  Ok to leave detailed message on voice mail for today's visit only Yes, phone # [phone]

## 2023-04-24 NOTE — LETTER
April 24, 2023      Deirdre Zapata  [address]   [place] [address]        To Whom It May Concern:    Deirdre Zapata was seen in our clinic. Please excuse her from work 4/24/23-4/26/23. She may return to work without restriction 4/27/23.      Sincerely,        Tra Vieira PA-C

## 2023-04-24 NOTE — PROGRESS NOTES
"  Assessment & Plan     Mild intermittent asthma with exacerbation-pt appears to have asthma flare up at this time. Will treat with prednisone (has worked well in the past) and await improvement  -Rest, increase fluids, honey for cough suppression/sore throat  -Add Mucinex and Sudafed D for symptomatic relief  -Ibuprofen/Tylenol as needed for symptomatic relief  Seek emergency care for significant shortness of breath, chest pain, and/or fever >103F that cannot be controlled with antipyretics   Letter provided, excused from work through 4/27/23  - predniSONE (DELTASONE) 20 MG tablet  Dispense: 10 tablet; Refill: 0  -Pt will call if needing albuterol Rx      Follow up as needed    No follow-ups on file.    Tra Vieira, JANETT  [facility]    Subjective   Yennifer is a 22 year old, presenting for the following health issues:  Asthma (Has cold like symptoms that are causing her asthma to flare-up, symptoms started Thursday, cough is painful in her chest, using albuterol inhaler often, wheezing and SOB at times)         View : No data to display.              HPI     Patient presents for asthma flare up. Started to get a URI 4 days ago-mild cold like illness, per patient. Main concern is breathing-having some chest/back pain, lots of coughing (sharp pain), mild SOB. Resting a lot, lots of fatigue. Has used prednisone orally in the past for asthma flare-ups-that has helped. Currently using albuterol for her symptoms-doesn't help much. Also using Dayquil. Last asthma flare up was many years ago. Doesn't need refill of albuterol, will request if needed.       Review of Systems   Constitutional, HEENT, cardiovascular, pulmonary, gi and gu systems are negative, except as otherwise noted.      Objective    BP 98/66 (BP Location: Right arm, Patient Position: Sitting, Cuff Size: Adult Regular)   Pulse 76   Temp 97.8  F (36.6  C) (Temporal)   Ht 1.619 m (5' 3.75\")   Wt 70 kg (154 lb 6.4 oz)   LMP 04/10/2023   " SpO2 95%   BMI 26.71 kg/m    Body mass index is 26.71 kg/m .  Physical Exam   GENERAL: healthy, alert and no distress  HENT: ear canals and TM's normal, nose and mouth without ulcers or lesions  NECK: no adenopathy, no asymmetry, masses, or scars and thyroid normal to palpation  RESP: lungs clear to auscultation - no rales, rhonchi or wheezes  CV: regular rate and rhythm, normal S1 S2, no S3 or S4, no murmur, click or rub, no peripheral edema and peripheral pulses strong  ABDOMEN: soft, nontender, no hepatosplenomegaly, no masses and bowel sounds normal  MS: no gross musculoskeletal defects noted, no edema  NEURO: Normal strength and tone, mentation intact and speech normal

## 2023-05-14 ENCOUNTER — HEALTH MAINTENANCE LETTER (OUTPATIENT)
Age: 23
End: 2023-05-14

## 2023-06-20 ENCOUNTER — OFFICE VISIT (OUTPATIENT)
Dept: FAMILY MEDICINE | Facility: CLINIC | Age: 23
End: 2023-06-20

## 2023-06-20 VITALS
BODY MASS INDEX: 26.63 KG/M2 | DIASTOLIC BLOOD PRESSURE: 66 MMHG | TEMPERATURE: 97.9 F | HEIGHT: 64 IN | WEIGHT: 156 LBS | HEART RATE: 82 BPM | SYSTOLIC BLOOD PRESSURE: 104 MMHG | OXYGEN SATURATION: 98 %

## 2023-06-20 DIAGNOSIS — K29.00 OTHER ACUTE GASTRITIS WITHOUT HEMORRHAGE: Primary | ICD-10-CM

## 2023-06-20 PROCEDURE — 99213 OFFICE O/P EST LOW 20 MIN: CPT | Performed by: PHYSICIAN ASSISTANT

## 2023-06-20 NOTE — PROGRESS NOTES
"  Assessment & Plan     Other acute gastritis without hemorrhage-suspect gastritis/GERD causing symptoms given water brash and lots of belching. Pt feeling better on Mylanta-will initiate this while she starts OTC PPI   Lander/low acid diet, no NSAIDs for at least 10 days  Omeprazole 20mg daily for 3 weeks  Seek emergency care for any black/red stools or vomiting, worsening abd pain        Follow up as needed    No follow-ups on file.    Tra Vieira, JANETT  [facility]    Subjective   Yennifer is a 23 year old, presenting for the following health issues:  Abdominal Pain (Upper abdominal pain started in the middle of the night, feels like a tight squeeze to her stomach, nausea) and Derm Problem (Ingrown hair on right calf now red and inflamed, oozes puss at times)    HPI     Pt presents with epigastric pain starting last night-woke up to this at 2am. No relief of the pain-described as cramping and this comes and goes. More gassy recently-lots of burping. Last meal was Mac and Cheese last night. She has Celiac disease, so sensitive stomach, but this feels different. Pain is gnawing in character with associated nausea. No ibuprofen use recently. No vomiting. Is having some water brash with burping. Small BM today-chronic constipation. Denies black/red in stools and no hematemesis. No fevers/chills.     Gave patient 20ml Mylanta-pt feels slightly better after taking this.    Review of Systems   Constitutional, HEENT, cardiovascular, pulmonary, gi and gu systems are negative, except as otherwise noted.      Objective    /66 (BP Location: Left arm, Patient Position: Sitting, Cuff Size: Adult Large)   Pulse 82   Temp 97.9  F (36.6  C) (Temporal)   Ht 1.619 m (5' 3.75\")   Wt 70.8 kg (156 lb)   LMP 04/03/2023   SpO2 98%   BMI 26.99 kg/m    Body mass index is 26.99 kg/m .  Physical Exam   GENERAL: healthy, alert and no distress  NECK: no adenopathy, no asymmetry, masses, or scars and thyroid normal " to palpation  RESP: lungs clear to auscultation - no rales, rhonchi or wheezes  CV: regular rate and rhythm, normal S1 S2, no S3 or S4, no murmur, click or rub, no peripheral edema and peripheral pulses strong  ABDOMEN: mild tenderness epigastric without guarding and bowel sounds normal  MS: no gross musculoskeletal defects noted, no edema  NEURO: Normal strength and tone, mentation intact and speech normal  PSYCH: mentation appears normal, affect normal/bright

## 2023-06-20 NOTE — NURSING NOTE
Chief Complaint   Patient presents with     Abdominal Pain     Upper abdominal pain started in the middle of the night, feels like a tight squeeze to her stomach, nausea     Derm Problem     Ingrown hair on right calf now red and inflamed, oozes puss at times     Pre-visit Screening:  Immunizations:  up to date  Colonoscopy:  NA  Mammogram: NA  Asthma Action Test/Plan:  NA  PHQ9:  NA  GAD7:  NA  Questioned patient about current smoking habits Pt. has never smoked.  Ok to leave detailed message on voice mail for today's visit only Yes, phone # [phone]

## 2023-07-19 DIAGNOSIS — F41.1 GAD (GENERALIZED ANXIETY DISORDER): ICD-10-CM

## 2023-07-20 RX ORDER — SERTRALINE HYDROCHLORIDE 100 MG/1
TABLET, FILM COATED ORAL
Qty: 180 TABLET | Refills: 1 | COMMUNITY
Start: 2023-07-20

## 2023-07-20 NOTE — TELEPHONE ENCOUNTER
Deirdre Zapata is requesting a refill of:    Refused Prescriptions:                       Disp   Refills    sertraline (ZOLOFT) 100 MG tablet [Pharmac*180 ta*1        Sig: TAKE 2 TABLETS BY MOUTH EVERY DAY  Refused By: FARNAZ GARCIA  Reason for Refusal: Patient needs appointment    Needs OV for refills

## 2023-08-07 ENCOUNTER — OFFICE VISIT (OUTPATIENT)
Dept: FAMILY MEDICINE | Facility: CLINIC | Age: 23
End: 2023-08-07

## 2023-08-07 VITALS
SYSTOLIC BLOOD PRESSURE: 108 MMHG | BODY MASS INDEX: 27.08 KG/M2 | OXYGEN SATURATION: 98 % | TEMPERATURE: 97.3 F | HEIGHT: 64 IN | WEIGHT: 158.6 LBS | HEART RATE: 64 BPM | DIASTOLIC BLOOD PRESSURE: 64 MMHG

## 2023-08-07 DIAGNOSIS — F41.1 GAD (GENERALIZED ANXIETY DISORDER): Primary | ICD-10-CM

## 2023-08-07 DIAGNOSIS — J45.31 MILD PERSISTENT ASTHMA WITH EXACERBATION: ICD-10-CM

## 2023-08-07 DIAGNOSIS — R31.29 MICROSCOPIC HEMATURIA: ICD-10-CM

## 2023-08-07 LAB
APPEARANCE UR: ABNORMAL
BACTERIA, UR: ABNORMAL
BILIRUB UR QL: ABNORMAL
CASTS/LPF: ABNORMAL
COLOR UR: YELLOW
EP/HPF: ABNORMAL
GLUCOSE URINE: ABNORMAL MG/DL
HGB UR QL: ABNORMAL
KETONES UR QL: ABNORMAL MG/DL
MISC.: ABNORMAL
NITRITE UR QL STRIP: ABNORMAL
PH UR STRIP: 6 PH (ref 5–7)
PROT UR QL: ABNORMAL MG/DL
RBC, UR MICRO: ABNORMAL (ref ?–2)
SP GR UR STRIP: ABNORMAL
UROBILINOGEN UR QL STRIP: 0.2 EU/DL (ref 0.2–1)
WBC #/AREA URNS HPF: ABNORMAL /[HPF]
WBC, UR MICRO: ABNORMAL (ref ?–2)

## 2023-08-07 PROCEDURE — 99214 OFFICE O/P EST MOD 30 MIN: CPT | Performed by: STUDENT IN AN ORGANIZED HEALTH CARE EDUCATION/TRAINING PROGRAM

## 2023-08-07 PROCEDURE — 81001 URINALYSIS AUTO W/SCOPE: CPT | Performed by: STUDENT IN AN ORGANIZED HEALTH CARE EDUCATION/TRAINING PROGRAM

## 2023-08-07 RX ORDER — PREDNISONE 20 MG/1
40 TABLET ORAL
Qty: 10 TABLET | Refills: 0 | Status: SHIPPED | OUTPATIENT
Start: 2023-08-07 | End: 2023-08-12

## 2023-08-07 RX ORDER — SERTRALINE HYDROCHLORIDE 100 MG/1
200 TABLET, FILM COATED ORAL DAILY
Qty: 180 TABLET | Refills: 3 | Status: SHIPPED | OUTPATIENT
Start: 2023-08-07 | End: 2024-04-01

## 2023-08-07 RX ORDER — SERTRALINE HYDROCHLORIDE 100 MG/1
200 TABLET, FILM COATED ORAL DAILY
Qty: 180 TABLET | Refills: 1 | Status: SHIPPED | OUTPATIENT
Start: 2023-08-07 | End: 2023-08-07

## 2023-08-07 ASSESSMENT — PATIENT HEALTH QUESTIONNAIRE - PHQ9
SUM OF ALL RESPONSES TO PHQ QUESTIONS 1-9: 2
5. POOR APPETITE OR OVEREATING: SEVERAL DAYS

## 2023-08-07 ASSESSMENT — ANXIETY QUESTIONNAIRES
IF YOU CHECKED OFF ANY PROBLEMS ON THIS QUESTIONNAIRE, HOW DIFFICULT HAVE THESE PROBLEMS MADE IT FOR YOU TO DO YOUR WORK, TAKE CARE OF THINGS AT HOME, OR GET ALONG WITH OTHER PEOPLE: SOMEWHAT DIFFICULT
7. FEELING AFRAID AS IF SOMETHING AWFUL MIGHT HAPPEN: NOT AT ALL
6. BECOMING EASILY ANNOYED OR IRRITABLE: MORE THAN HALF THE DAYS
2. NOT BEING ABLE TO STOP OR CONTROL WORRYING: SEVERAL DAYS
GAD7 TOTAL SCORE: 5
GAD7 TOTAL SCORE: 5
3. WORRYING TOO MUCH ABOUT DIFFERENT THINGS: NOT AT ALL
1. FEELING NERVOUS, ANXIOUS, OR ON EDGE: SEVERAL DAYS
5. BEING SO RESTLESS THAT IT IS HARD TO SIT STILL: NOT AT ALL

## 2023-08-07 ASSESSMENT — ASTHMA QUESTIONNAIRES: ACT_TOTALSCORE: 21

## 2023-08-07 NOTE — NURSING NOTE
Chief Complaint   Patient presents with    RECHECK     Pt is here to get refill on sertraline medication.         Pre-visit Screening:  Immunizations:  up to date  Colonoscopy:  NA  Mammogram: NA  Asthma Action Test/Plan:  NA  PHQ9:  NA  GAD7:  NA  Questioned patient about current smoking habits Pt. has never smoked.  Ok to leave detailed message on voice mail for today's visit only YES, phone # [phone]

## 2023-08-07 NOTE — PATIENT INSTRUCTIONS
Urine sample today    Prednisone with breakfast, let me know if not clearing up    Start pulmicort twice daily    Continue  sertraline    Follow-up yearly, sooner if needed

## 2023-08-07 NOTE — PROGRESS NOTES
Assessment & Plan       ICD-10-CM    1. JERRY (generalized anxiety disorder)  F41.1            There are no Patient Instructions on file for this visit.     Reasons to follow-up sooner or seek emergent care reviewed.     >*** minutes spent on the date of the encounter doing chart review, history and exam, documentation and further activities per the note    Rickey Renee MD, [facility]       Subjective     Deirdre Zapata is a 23 year old female who presents to clinic today for the following health issues:    HPI   Chief Complaint   Patient presents with     RECHECK     Pt       {SUPERLIST (Optional):065543}  {additonal problems for provider to add (Optional):038341}        Objective    LMP 04/03/2023   There is no height or weight on file to calculate BMI.  Alert, NAD  NC/AT  Sclerae anicteric  Regular  Resp nonlabored  Skin warm and dry  No focal neuro deficits. Speech intact.   Appropriate affect  Normal gait.    ***    Labs reviewed.  {Diagnostic Test Results (Optional):292926}

## 2023-08-07 NOTE — PROGRESS NOTES
Assessment & Plan     1. JERRY (generalized anxiety disorder)  Doing well overall, continue current dose  - sertraline (ZOLOFT) 100 MG tablet; Take 2 tablets (200 mg) by mouth daily  Dispense: 180 tablet; Refill: 3    2. Mild persistent asthma with exacerbation  URI triggered exacerbation, r/b oral steroids reviewed and pt elects to start. Has needed oral steroid twice now in past few months, will start seasonal ICS, r/b reviewed.   - budesonide (PULMICORT FLEXHALER) 180 MCG/ACT inhaler; Inhale 1 puff into the lungs 2 times daily  Dispense: 1 each; Refill: 11  - predniSONE (DELTASONE) 20 MG tablet; Take 2 tablets (40 mg) by mouth daily (with breakfast) for 5 days  Dispense: 10 tablet; Refill: 0    3. Microscopic hematuria  Asx, recheck.  - URINALYSIS, ROUTINE (BFP)     Patient Instructions   Urine sample today    Prednisone with breakfast, let me know if not clearing up    Start pulmicort twice daily    Continue  sertraline    Follow-up yearly, sooner if needed       Reasons to follow-up sooner or seek emergent care reviewed.     >30 minutes spent on the date of the encounter doing chart review, history and exam, documentation and further activities per the note    Rickey Renee MD, [facility]       Subjective     Deirdre Zapata is a 23 year old female who presents to clinic today for the following health issues:    HPI   Chief Complaint   Patient presents with     RECHECK     Pt is here to get refill on sertraline medication.       Anxiety Follow-Up    How are you doing with your anxiety since your last visit? Doing well    Are you having other symptoms that might be associated with anxiety? No    Have you had a significant life event? Multiple stressors but coping well     Are you feeling depressed? No    Do you have any concerns with your use of alcohol or other drugs? No    Social History     Tobacco Use     Smoking status: Never     Passive exposure: Never     Smokeless tobacco:  "Never   Substance Use Topics     Alcohol use: Yes     Alcohol/week: 3.0 standard drinks of alcohol     Types: 3 Standard drinks or equivalent per week     Comment: weekends     Drug use: Never         5/17/2022     8:54 AM 6/20/2022    10:15 AM 1/17/2023     9:46 AM   JERRY-7 SCORE   Total Score  6 (mild anxiety)    Total Score 11 6 2         5/17/2022     8:54 AM 6/20/2022    10:15 AM 1/17/2023     9:46 AM   PHQ   PHQ-9 Total Score 7 6 5   Q9: Thoughts of better off dead/self-harm past 2 weeks Not at all Not at all Not at all       Asthma Follow-Up  Increased cough and wheezing since cold sx started 2 wks ago  Was ACT completed today?  Yes        8/7/2023     3:38 PM   ACT Total Scores   ACT TOTAL SCORE (Goal Greater than or Equal to 20) 21   In the past 12 months, how many times did you visit the emergency room for your asthma without being admitted to the hospital? 0   In the past 12 months, how many times were you hospitalized overnight because of your asthma? 0          How many days per week do you miss taking your asthma controller medication?  I do not have an asthma controller medication    Please describe any recent triggers for your asthma: upper respiratory infections    Have you had any Emergency Room Visits, Urgent Care Visits, or Hospital Admissions since your last office visit?  No    No  sx or concerns, microscopic hematuria noted at last visit.         Objective    /64 (BP Location: Left arm, Patient Position: Sitting, Cuff Size: Adult Regular)   Pulse 64   Temp 97.3  F (36.3  C) (Oral)   Ht 1.619 m (5' 3.75\")   Wt 71.9 kg (158 lb 9.6 oz)   LMP 07/17/2023   SpO2 98%   BMI 27.44 kg/m    Body mass index is 27.44 kg/m .  Alert, NAD  NC/AT  Sclerae anicteric  Regular  Resp nonlabored, few exp wheezes  Skin warm and dry  No focal neuro deficits. Speech intact.   Appropriate affect  Normal gait.        Labs reviewed.        "

## 2023-11-06 ENCOUNTER — OFFICE VISIT (OUTPATIENT)
Dept: FAMILY MEDICINE | Facility: CLINIC | Age: 23
End: 2023-11-06

## 2023-11-06 VITALS
TEMPERATURE: 98 F | HEART RATE: 87 BPM | BODY MASS INDEX: 25.95 KG/M2 | RESPIRATION RATE: 20 BRPM | WEIGHT: 150 LBS | OXYGEN SATURATION: 99 % | DIASTOLIC BLOOD PRESSURE: 74 MMHG | SYSTOLIC BLOOD PRESSURE: 102 MMHG

## 2023-11-06 DIAGNOSIS — F41.1 GAD (GENERALIZED ANXIETY DISORDER): Primary | ICD-10-CM

## 2023-11-06 DIAGNOSIS — K90.0 CELIAC DISEASE: ICD-10-CM

## 2023-11-06 DIAGNOSIS — Z23 NEED FOR VACCINATION: ICD-10-CM

## 2023-11-06 DIAGNOSIS — Z86.39 HX OF IRON DEFICIENCY: ICD-10-CM

## 2023-11-06 DIAGNOSIS — Z11.3 SCREEN FOR STD (SEXUALLY TRANSMITTED DISEASE): ICD-10-CM

## 2023-11-06 DIAGNOSIS — Z83.49 FAMILY HISTORY OF THYROID DISEASE: ICD-10-CM

## 2023-11-06 LAB
ERYTHROCYTE [DISTWIDTH] IN BLOOD BY AUTOMATED COUNT: 12.2 %
HCT VFR BLD AUTO: 39.2 % (ref 35–47)
HEMOGLOBIN: 12.4 G/DL (ref 11.7–15.7)
MCH RBC QN AUTO: 28 PG (ref 26–33)
MCHC RBC AUTO-ENTMCNC: 31.6 G/DL (ref 31–36)
MCV RBC AUTO: 88.6 FL (ref 78–100)
PLATELET COUNT - QUEST: 269 10^9/L (ref 150–375)
RBC # BLD AUTO: 4.43 10*12/L (ref 3.8–5.2)
WBC # BLD AUTO: 8.6 10*9/L (ref 4–11)

## 2023-11-06 PROCEDURE — 85027 COMPLETE CBC AUTOMATED: CPT | Performed by: STUDENT IN AN ORGANIZED HEALTH CARE EDUCATION/TRAINING PROGRAM

## 2023-11-06 PROCEDURE — 82728 ASSAY OF FERRITIN: CPT | Mod: 90 | Performed by: STUDENT IN AN ORGANIZED HEALTH CARE EDUCATION/TRAINING PROGRAM

## 2023-11-06 PROCEDURE — 99214 OFFICE O/P EST MOD 30 MIN: CPT | Mod: 25 | Performed by: STUDENT IN AN ORGANIZED HEALTH CARE EDUCATION/TRAINING PROGRAM

## 2023-11-06 PROCEDURE — 87591 N.GONORRHOEAE DNA AMP PROB: CPT | Mod: 90 | Performed by: STUDENT IN AN ORGANIZED HEALTH CARE EDUCATION/TRAINING PROGRAM

## 2023-11-06 PROCEDURE — 90471 IMMUNIZATION ADMIN: CPT | Performed by: STUDENT IN AN ORGANIZED HEALTH CARE EDUCATION/TRAINING PROGRAM

## 2023-11-06 PROCEDURE — 36415 COLL VENOUS BLD VENIPUNCTURE: CPT | Performed by: STUDENT IN AN ORGANIZED HEALTH CARE EDUCATION/TRAINING PROGRAM

## 2023-11-06 PROCEDURE — 87491 CHLMYD TRACH DNA AMP PROBE: CPT | Mod: 90 | Performed by: STUDENT IN AN ORGANIZED HEALTH CARE EDUCATION/TRAINING PROGRAM

## 2023-11-06 PROCEDURE — 90686 IIV4 VACC NO PRSV 0.5 ML IM: CPT | Performed by: STUDENT IN AN ORGANIZED HEALTH CARE EDUCATION/TRAINING PROGRAM

## 2023-11-06 PROCEDURE — 84443 ASSAY THYROID STIM HORMONE: CPT | Mod: 90 | Performed by: STUDENT IN AN ORGANIZED HEALTH CARE EDUCATION/TRAINING PROGRAM

## 2023-11-06 ASSESSMENT — ANXIETY QUESTIONNAIRES
7. FEELING AFRAID AS IF SOMETHING AWFUL MIGHT HAPPEN: NOT AT ALL
GAD7 TOTAL SCORE: 10
3. WORRYING TOO MUCH ABOUT DIFFERENT THINGS: SEVERAL DAYS
6. BECOMING EASILY ANNOYED OR IRRITABLE: MORE THAN HALF THE DAYS
GAD7 TOTAL SCORE: 10
5. BEING SO RESTLESS THAT IT IS HARD TO SIT STILL: SEVERAL DAYS
1. FEELING NERVOUS, ANXIOUS, OR ON EDGE: MORE THAN HALF THE DAYS
IF YOU CHECKED OFF ANY PROBLEMS ON THIS QUESTIONNAIRE, HOW DIFFICULT HAVE THESE PROBLEMS MADE IT FOR YOU TO DO YOUR WORK, TAKE CARE OF THINGS AT HOME, OR GET ALONG WITH OTHER PEOPLE: VERY DIFFICULT
2. NOT BEING ABLE TO STOP OR CONTROL WORRYING: MORE THAN HALF THE DAYS

## 2023-11-06 NOTE — PROGRESS NOTES
Assessment & Plan       ICD-10-CM    1. JERRY (generalized anxiety disorder)  F41.1 Adult Mental Health  Referral - To a [facility] Location (Use POS/Location)      2. Need for vaccination  Z23       3. Celiac disease  K90.0 Hemogram Platelet ([facility])     FERRITIN ([facility])      4. Family history of thyroid disease  Z83.49 TSH WITH FREE T4 REFLEX ([facility])      5. Hx of iron deficiency  Z86.39 FERRITIN ([facility])      6. Screen for STD (sexually transmitted disease)  Z11.3 Chlam Trach/N. Gonorrhoeae RNA TMA([facility]           Patient Instructions   Blood and urine tests today    Consult with Tory Dsouza   [facility]  [address]  [address]  [facility] [address] [phone] - appt line    Try to get back in routine of taking sertraline daily         Reasons to follow-up or seek emergent care reviewed.     >30 minutes spent on the date of the encounter doing chart review, history and exam, documentation and further activities per the note    Rickey Renee MD, [facility]       Subjective     Deirdre Zapata is a 23 year old female who presents to clinic today for the following health issues:    HPI   Chief Complaint   Patient presents with    Consult For     Wants to review mental health, has been hard lately, feels that she may have OCD that is contributing a lot to her anxiety, wants to know who she can see in the mental health field to help with this      Anxiety Follow-Up  How are you doing with your anxiety since your last visit? Struggling more. Over-thinking a lot of things, especially interactions with coworkers. Also going through breakup. Waking up more frequently. Lower appetite. Hasn't been taking sertraline as consistently past 2 weeks.   Has seen therapist in the past but not recently.  Sister with anxiety, depression and OCD. No other family pscyhiatric hx.     Social History     Tobacco Use    Smoking status: Never      Passive exposure: Never    Smokeless tobacco: Never   Substance Use Topics    Alcohol use: Yes     Alcohol/week: 3.0 standard drinks of alcohol     Types: 3 Standard drinks or equivalent per week     Comment: weekends    Drug use: Never         1/17/2023     9:46 AM 8/7/2023     3:38 PM 11/6/2023     3:40 PM   JERRY-7 SCORE   Total Score 2 5 10         1/17/2023     9:46 AM 8/7/2023     3:38 PM 11/6/2023     3:40 PM   PHQ   PHQ-9 Total Score 5 2 7   Q9: Thoughts of better off dead/self-harm past 2 weeks Not at all Not at all Not at all     Family History   Problem Relation Age of Onset    No Known Problems Mother     Hypothyroidism Father     Hypertension Father     Anxiety Disorder Sister     Other - See Comments Sister         RSV    Asthma Sister     Obsessive Compulsive Disorder Sister     Breast Cancer Maternal Grandmother 60    Diabetes Type 2  Maternal Grandmother     Diabetes Type 2  Maternal Grandfather     Alzheimer Disease Maternal Grandfather     Colon Cancer Paternal Grandmother 70    Anxiety Disorder Paternal Grandmother     Alcoholism Paternal Grandfather             Objective    /74 (BP Location: Left arm, Patient Position: Sitting, Cuff Size: Adult Large)   Pulse 87   Temp 98  F (36.7  C) (Temporal)   Resp 20   Wt 68 kg (150 lb)   SpO2 99%   BMI 25.95 kg/m    Body mass index is 25.95 kg/m .  Alert, NAD  NC/AT  Sclerae anicteric  Regular  Resp nonlabored  Skin warm and dry  No focal neuro deficits. Speech intact.   Appropriate affect, normal thought content  Normal gait.      Labs reviewed.  Results for orders placed or performed in visit on 11/06/23   Hemogram Platelet (BFP)     Status: None   Result Value Ref Range    WBC 8.6 4.0 - 11 10*9/L    RBC Count 4.43 3.8 - 5.2 10*12/L    Hemoglobin 12.4 11.7 - 15.7 g/dL    Hematocrit 39.2 35.0 - 47.0 %    MCV 88.6 78 - 100 fL    MCH 28.0 26 - 33 pg    MCHC 31.6 31 - 36 g/dL    RDW 12.2 %    Platelet Count 269 150 - 375 10^9/L

## 2023-11-06 NOTE — PATIENT INSTRUCTIONS
Blood and urine tests today    Consult with Tory Dsouza   [facility]  [address]  [address]  [facility] [address]  [phone] - appt line    Try to get back in routine of taking sertraline daily

## 2023-11-06 NOTE — NURSING NOTE
Chief Complaint   Patient presents with    Consult For     Wants to review mental health, has been hard lately, feels that she may have OCD that is contributing a lot to her anxiety, wants to know who she can see in the mental health field to help with this     Pre-visit Screening:  Immunizations:  not up to date - due for 3rd Hep B shot but will come back another time   Colonoscopy:  na  Mammogram: na  Asthma Action Test/Plan:  na  PHQ9:  given today   GAD7:  given today   Questioned patient about current smoking habits Pt. has never smoked.  Ok to leave detailed message on voice mail for today's visit only yes, phone # [phone] (home)

## 2023-11-07 LAB
CHLAMYDIA TRACHOMATIS RNA, TMA - QUEST: NOT DETECTED
FERRITIN SERPL-MCNC: 18 NG/ML (ref 16–154)
NEISSERIA GONORRHOEAE RNA TMA: NOT DETECTED
SEE NOTE - QUEST: NORMAL
TSH SERPL-ACNC: 2.49 MIU/L

## 2023-12-04 ENCOUNTER — MYC REFILL (OUTPATIENT)
Dept: FAMILY MEDICINE | Facility: CLINIC | Age: 23
End: 2023-12-04

## 2023-12-04 ENCOUNTER — OFFICE VISIT (OUTPATIENT)
Dept: FAMILY MEDICINE | Facility: CLINIC | Age: 23
End: 2023-12-04

## 2023-12-04 VITALS
DIASTOLIC BLOOD PRESSURE: 76 MMHG | SYSTOLIC BLOOD PRESSURE: 112 MMHG | HEART RATE: 63 BPM | RESPIRATION RATE: 22 BRPM | TEMPERATURE: 97.8 F | WEIGHT: 149 LBS | OXYGEN SATURATION: 96 % | BODY MASS INDEX: 25.78 KG/M2

## 2023-12-04 DIAGNOSIS — R52 BODY ACHES: ICD-10-CM

## 2023-12-04 DIAGNOSIS — R51.9 NONINTRACTABLE HEADACHE, UNSPECIFIED CHRONICITY PATTERN, UNSPECIFIED HEADACHE TYPE: Primary | ICD-10-CM

## 2023-12-04 DIAGNOSIS — J06.9 VIRAL URI: ICD-10-CM

## 2023-12-04 DIAGNOSIS — R06.2 WHEEZING: ICD-10-CM

## 2023-12-04 DIAGNOSIS — J45.31 MILD PERSISTENT ASTHMA WITH EXACERBATION: Primary | ICD-10-CM

## 2023-12-04 DIAGNOSIS — J45.31 MILD PERSISTENT ASTHMA WITH EXACERBATION: ICD-10-CM

## 2023-12-04 LAB
COVID-19: NEGATIVE
FLUAV AG UPPER RESP QL IA.RAPID: NORMAL
FLUBV AG UPPER RESP QL IA.RAPID: NORMAL

## 2023-12-04 PROCEDURE — 99213 OFFICE O/P EST LOW 20 MIN: CPT | Performed by: STUDENT IN AN ORGANIZED HEALTH CARE EDUCATION/TRAINING PROGRAM

## 2023-12-04 PROCEDURE — 87635 SARS-COV-2 COVID-19 AMP PRB: CPT | Performed by: STUDENT IN AN ORGANIZED HEALTH CARE EDUCATION/TRAINING PROGRAM

## 2023-12-04 PROCEDURE — 87804 INFLUENZA ASSAY W/OPTIC: CPT | Performed by: STUDENT IN AN ORGANIZED HEALTH CARE EDUCATION/TRAINING PROGRAM

## 2023-12-04 RX ORDER — FLUTICASONE FUROATE AND VILANTEROL 200; 25 UG/1; UG/1
1 POWDER RESPIRATORY (INHALATION) DAILY
Qty: 60 EACH | Refills: 1 | Status: SHIPPED | OUTPATIENT
Start: 2023-12-04 | End: 2023-12-14

## 2023-12-04 RX ORDER — PREDNISONE 20 MG/1
40 TABLET ORAL
Qty: 10 TABLET | Refills: 0 | Status: SHIPPED | OUTPATIENT
Start: 2023-12-04 | End: 2023-12-09

## 2023-12-04 RX ORDER — ALBUTEROL SULFATE 90 UG/1
2 AEROSOL, METERED RESPIRATORY (INHALATION) EVERY 4 HOURS PRN
Qty: 18 G | Refills: 1 | Status: SHIPPED | OUTPATIENT
Start: 2023-12-04

## 2023-12-04 NOTE — PATIENT INSTRUCTIONS
Start using albuterol every 4-6 hours, if chest tightness doesn't improve at all would start prednisone    If we need to send pulmicort alternative we can    Go to ER with any significant worsening

## 2023-12-04 NOTE — TELEPHONE ENCOUNTER
Please review, pt's pulmicort is on backorder. Pharmacist advised breo or Advair.    Deirdre Zapata is requesting a refill of:    Pending Prescriptions:                       Disp   Refills    albuterol (PROAIR HFA/PROVENTIL HFA/MIRIAM*18 g   1            Sig: Inhale 2 puffs into the lungs every 4 hours as           needed for shortness of breath, wheezing or cough    fluticasone-vilanterol (BREO ELLIPTA) 200*60 each1            Sig: Inhale 1 puff into the lungs daily

## 2023-12-04 NOTE — NURSING NOTE
Chief Complaint   Patient presents with    Cold Symptoms     Has been congested since this past Thursday, started with headaches, body aches and fevers, yesterday transitioned more to sinus pressure and congestion along with some chest pain, has been sleeping a lot but still feels very tired and worn out     Pre-visit Screening:  Immunizations:  up to date  Colonoscopy:  na  Mammogram: na  Asthma Action Test/Plan:  na  PHQ9:  na  GAD7:  na  Questioned patient about current smoking habits Pt. has never smoked.  Ok to leave detailed message on voice mail for today's visit only yes, phone # [phone] (home)

## 2023-12-04 NOTE — PROGRESS NOTES
Assessment & Plan       ICD-10-CM    1. Fever  R50.9 Influenza A and B (BFP)     COVID-19 (BFP)      2. Headache  R51.9 COVID-19 (BFP)      3. Body aches  R52 Influenza A and B (BFP)      4. Wheezing  R06.2 predniSONE (DELTASONE) 20 MG tablet      5. Mild persistent asthma with exacerbation  J45.31 predniSONE (DELTASONE) 20 MG tablet      6. Viral URI  J06.9            Patient Instructions   Start using albuterol every 4-6 hours, if chest tightness doesn't improve at all would start prednisone    If we need to send pulmicort alternative we can    Go to ER with any significant worsening     Reasons to follow-up or seek emergent care reviewed.     Rickey Renee MD, [facility]       Subjective     Alangladisrowena Zapata is a 23 year old female who presents to clinic today for the following health issues:    HPI   Chief Complaint   Patient presents with    Cold Symptoms     Has been congested since this past Thursday, started with headaches, body aches and fevers, yesterday transitioned more to sinus pressure and congestion along with some chest pain, has been sleeping a lot but still feels very tired and worn out      Sx started Thursday, headaches.  Now more sinus congestion and chest tightness. No CP or exertional sx. Breathing fine, no wheezing.   Still hasn't been able to get pulmicort filled since August  Hasn't used albuterol  Dayquil/nyquil somewhat helpful          Objective    /76 (BP Location: Left arm, Patient Position: Sitting, Cuff Size: Adult Large)   Pulse 63   Temp 97.8  F (36.6  C) (Oral)   Resp 22   Wt 67.6 kg (149 lb)   LMP 12/02/2023 (Exact Date)   SpO2 96%   BMI 25.78 kg/m    Body mass index is 25.78 kg/m .  Alert, NAD  NC/AT  Sclerae anicteric  Regular  Resp nonlabored, few faint wheezes left side  Skin warm and dry  No focal neuro deficits. Speech intact.   Appropriate affect  Normal gait.    Labs reviewed.  Results for orders placed or performed in visit on  12/04/23   Influenza A and B (BFP)     Status: None   Result Value Ref Range    Influenza A neg neg    Influenza B neg neg   COVID-19 (BFP)     Status: None   Result Value Ref Range    COVID-19 Negative

## 2023-12-12 ENCOUNTER — TRANSFERRED RECORDS (OUTPATIENT)
Dept: FAMILY MEDICINE | Facility: CLINIC | Age: 23
End: 2023-12-12

## 2023-12-13 ENCOUNTER — MYC MEDICAL ADVICE (OUTPATIENT)
Dept: FAMILY MEDICINE | Facility: CLINIC | Age: 23
End: 2023-12-13

## 2023-12-13 DIAGNOSIS — J45.31 MILD PERSISTENT ASTHMA WITH EXACERBATION: Primary | ICD-10-CM

## 2023-12-13 NOTE — TELEPHONE ENCOUNTER
Can you send in alternative inhaler, pt will use [employer] coupon to purchase.    Deirdre Zapata is requesting a refill of:    Pending Prescriptions:                       Disp   Refills    fluticasone-salmeterol (AIRDUO RESPICLICK*1 each 1            Sig: Inhale 1 puff into the lungs 2 times daily

## 2023-12-14 RX ORDER — FLUTICASONE PROPIONATE AND SALMETEROL 113; 14 UG/1; UG/1
1 POWDER, METERED RESPIRATORY (INHALATION) 2 TIMES DAILY
Qty: 1 EACH | Refills: 1 | Status: SHIPPED | OUTPATIENT
Start: 2023-12-14

## 2023-12-14 RX ORDER — FLUTICASONE PROPIONATE AND SALMETEROL 113; 14 UG/1; UG/1
1 POWDER, METERED RESPIRATORY (INHALATION) 2 TIMES DAILY
Qty: 1 EACH | Refills: 1 | Status: SHIPPED | OUTPATIENT
Start: 2023-12-14 | End: 2023-12-14

## 2023-12-14 NOTE — TELEPHONE ENCOUNTER
Can you resend to [facility]. There is no inhalers that are cost effective, and no pharmacies in stock. I verified and [facility] has 2 inhalers available.     Deirdre Zapata is requesting a refill of:    Pending Prescriptions:                       Disp   Refills    fluticasone-salmeterol (AIRDUO RESPICLICK*1 each 1            Sig: Inhale 1 puff into the lungs 2 times daily    Signed Prescriptions:                        Disp   Refills    fluticasone-salmeterol (AIRDUO RESPICLICK)*1 each 1        Sig: Inhale 1 puff into the lungs 2 times daily  Authorizing Provider: RUTH PRETTY

## 2023-12-18 NOTE — PROGRESS NOTES
"Assessment & Plan   Problem List Items Addressed This Visit    None  Visit Diagnoses       Acute cough    -  Primary    Relevant Medications    azithromycin (ZITHROMAX) 250 MG tablet    Other Relevant Orders    COVID-19 (BFP) (Completed)    Influenza A and B (BFP) (Completed)           1. Acute cough  Ongoing cough/uri. Continue inhalers, start zithromax.  - COVID-19 (BFP)  - Influenza A and B (BFP)  - azithromycin (ZITHROMAX) 250 MG tablet; Take 2 tablets (500 mg) by mouth daily for 1 day, THEN 1 tablet (250 mg) daily for 4 days.  Dispense: 6 tablet; Refill: 0            BMI:   Estimated body mass index is 25.78 kg/m  as calculated from the following:    Height as of 8/7/23: 1.619 m (5' 3.75\").    Weight as of 12/4/23: 67.6 kg (149 lb).       Use back up contraception for the rest of the month.    FUTURE APPOINTMENTS:       - Follow-up visit as needed.    No follow-ups on file.    Dilma Mccullough MD  [facility]    Subjective     Nursing Notes:   Mira Bedolla [facility]  12/19/2023  1:35 PM  Signed  Chief Complaint   Patient presents with    URI     Cough and SOB started 12/04, lingering cough and congestion, wheezing and SOB, cough worsened over the last few days, fever yesterday, asthma has been acting up      Pre-visit Screening:  Immunizations:  up to date  Colonoscopy:  NA  Mammogram: NA  Asthma Action Test/Plan:  NA  PHQ9:  NA  GAD7:  NA  Questioned patient about current smoking habits Pt. has never smoked.  Ok to leave detailed message on voice mail for today's visit only Yes, phone # [phone]       Deirdre Zapata is a 23 year old female who presents to clinic today for the following health issues   HPI     Had a cold a couple of weeks ago with cough and wheezing. Asthma flared up. She saw Dr. Renee, he gave her prednisone and new inhaler. Steroid inhaler. This has helped but lung pain is still there. Was exposed to covid.  Then got a little better now has a new cold, sx this time " are headache, cough, sneezing, this started 2 days ago. Still has lung pain.        Review of Systems   Constitutional, HEENT, cardiovascular, pulmonary, gi and gu systems are negative, except as otherwise noted.      Objective    /70 (BP Location: Right arm, Patient Position: Sitting, Cuff Size: Adult Large)   Pulse 97   Temp 97.8  F (36.6  C) (Temporal)   LMP 12/02/2023 (Exact Date)   SpO2 96%   There is no height or weight on file to calculate BMI.  Physical Exam   GENERAL: healthy, alert and no distress  RESP: lungs clear to auscultation - no rales, rhonchi or wheezes  CV: regular rate and rhythm, normal S1 S2, no S3 or S4, no murmur, click or rub, no peripheral edema and peripheral pulses strong  MS: no gross musculoskeletal defects noted, no edema  NEURO: Normal strength and tone, mentation intact and speech normal  PSYCH: mentation appears normal, affect normal/bright    Results for orders placed or performed in visit on 12/19/23   COVID-19 (BFP)     Status: None   Result Value Ref Range    COVID-19 Negative    Influenza A and B (BFP)     Status: None   Result Value Ref Range    Influenza A NEG neg    Influenza B NEG neg

## 2023-12-19 ENCOUNTER — OFFICE VISIT (OUTPATIENT)
Dept: FAMILY MEDICINE | Facility: CLINIC | Age: 23
End: 2023-12-19

## 2023-12-19 VITALS
TEMPERATURE: 97.8 F | HEART RATE: 97 BPM | DIASTOLIC BLOOD PRESSURE: 70 MMHG | SYSTOLIC BLOOD PRESSURE: 108 MMHG | OXYGEN SATURATION: 96 %

## 2023-12-19 DIAGNOSIS — R05.1 ACUTE COUGH: Primary | ICD-10-CM

## 2023-12-19 LAB
COVID-19: NEGATIVE
FLUAV AG UPPER RESP QL IA.RAPID: NORMAL
FLUBV AG UPPER RESP QL IA.RAPID: NORMAL

## 2023-12-19 PROCEDURE — 87635 SARS-COV-2 COVID-19 AMP PRB: CPT | Performed by: FAMILY MEDICINE

## 2023-12-19 PROCEDURE — 87804 INFLUENZA ASSAY W/OPTIC: CPT | Performed by: FAMILY MEDICINE

## 2023-12-19 PROCEDURE — 99214 OFFICE O/P EST MOD 30 MIN: CPT | Performed by: FAMILY MEDICINE

## 2023-12-19 RX ORDER — AZITHROMYCIN 250 MG/1
TABLET, FILM COATED ORAL
Qty: 6 TABLET | Refills: 0 | Status: SHIPPED | OUTPATIENT
Start: 2023-12-19 | End: 2023-12-24

## 2023-12-19 NOTE — NURSING NOTE
Chief Complaint   Patient presents with    URI     Cough and SOB started 12/04, lingering cough and congestion, wheezing and SOB, cough worsened over the last few days, fever yesterday, asthma has been acting up      Pre-visit Screening:  Immunizations:  up to date  Colonoscopy:  NA  Mammogram: NA  Asthma Action Test/Plan:  NA  PHQ9:  NA  GAD7:  NA  Questioned patient about current smoking habits Pt. has never smoked.  Ok to leave detailed message on voice mail for today's visit only Yes, phone # [phone]

## 2024-02-19 ENCOUNTER — TRANSFERRED RECORDS (OUTPATIENT)
Dept: FAMILY MEDICINE | Facility: CLINIC | Age: 24
End: 2024-02-19

## 2024-04-01 ENCOUNTER — OFFICE VISIT (OUTPATIENT)
Dept: FAMILY MEDICINE | Facility: CLINIC | Age: 24
End: 2024-04-01

## 2024-04-01 VITALS
WEIGHT: 173 LBS | SYSTOLIC BLOOD PRESSURE: 106 MMHG | OXYGEN SATURATION: 99 % | TEMPERATURE: 98.9 F | DIASTOLIC BLOOD PRESSURE: 70 MMHG | BODY MASS INDEX: 29.93 KG/M2 | HEART RATE: 102 BPM

## 2024-04-01 DIAGNOSIS — R07.0 THROAT PAIN: ICD-10-CM

## 2024-04-01 DIAGNOSIS — R09.81 SINUS CONGESTION: ICD-10-CM

## 2024-04-01 DIAGNOSIS — J06.9 UPPER RESPIRATORY TRACT INFECTION, UNSPECIFIED TYPE: Primary | ICD-10-CM

## 2024-04-01 PROCEDURE — 87651 STREP A DNA AMP PROBE: CPT

## 2024-04-01 PROCEDURE — 87635 SARS-COV-2 COVID-19 AMP PRB: CPT

## 2024-04-01 PROCEDURE — 99213 OFFICE O/P EST LOW 20 MIN: CPT

## 2024-04-01 PROCEDURE — 87804 INFLUENZA ASSAY W/OPTIC: CPT

## 2024-04-01 RX ORDER — DULOXETIN HYDROCHLORIDE 60 MG/1
CAPSULE, DELAYED RELEASE ORAL
COMMUNITY
Start: 2024-02-19

## 2024-04-01 RX ORDER — DULOXETIN HYDROCHLORIDE 30 MG/1
CAPSULE, DELAYED RELEASE ORAL
COMMUNITY
Start: 2024-02-19

## 2024-04-01 RX ORDER — AZITHROMYCIN 250 MG/1
TABLET, FILM COATED ORAL
Qty: 6 TABLET | Refills: 0 | Status: SHIPPED | OUTPATIENT
Start: 2024-04-01 | End: 2024-04-06

## 2024-04-01 NOTE — PROGRESS NOTES
Assessment & Plan     1. Upper respiratory tract infection, unspecified type  - Discussed with Yennifer her symptoms and physical exam are most consistent with a viral URI. Strep, COVID, and influenza all negative. However given her history of seasonal allergies, she is prone to sinus infections so will RX Z-olga that I recommend Yennifer take by Wednesday or Thursday this week at the earliest if her symptoms continue to worsen and/or not improve. We did discuss the risk of rising antibiotic resistance and patient demonstrated understanding. Also encouraged Yennifer to continue to rest, push plenty of fluids, saline nasal rinses for congestion, Tylenol/Ibuprofen for fevers/chills, etc. Return to clinic and ER precautions discussed.   - azithromycin (ZITHROMAX) 250 MG tablet; Take 2 tablets (500 mg) by mouth daily for 1 day, THEN 1 tablet (250 mg) daily for 4 days.  Dispense: 6 tablet; Refill: 0    2. Sinus congestion  - See above.   - Strep A (BFP)  - COVID-19 (BFP)  - Influenza A and B (BFP)  - azithromycin (ZITHROMAX) 250 MG tablet; Take 2 tablets (500 mg) by mouth daily for 1 day, THEN 1 tablet (250 mg) daily for 4 days.  Dispense: 6 tablet; Refill: 0    3. Throat pain  - See above.   - Strep A (BFP)  - COVID-19 (BFP)  - Influenza A and B (BFP)    Follow up as needed. Reasons to follow-up sooner or seek emergent care reviewed.     Ashley Tenorio PA-C  [facility]       Subjective     Deirdre ALLY Ritasegundoalessandro is a 23 year old female who presents to clinic today for the following health issues:    HPI   Chief Complaint   Patient presents with     URI     Sinus pressure started 1 week, congestion, facial pain into head and neck, sore throat, fever of 102 yesterday      Yennifer presents with concerns of worsening sinus pressure for the last week. She notes her symptoms initially started with a sore throat, slight sinus pressure, and pain in both ears. As the week has gone by, Yennifer notes her sinus pressure has  gotten worse and she had a fever of 100.2 F yesterday. She also has a lot of nasal congestion but notes it is not draining. She denies any cough, SOB, or chest pain. She is eating and drinking normally. She denies any recent exposure to anyone ill that she knows of. She has been taking Tylenol, Ibuprofen, and Sudafed for her symptoms. She does have a history of seasonal allergies and notes those have been slightly worse this year due to the weather. She does have a history of sinus infections.     Daily medications reviewed.       Objective    /70 (BP Location: Right arm, Patient Position: Sitting, Cuff Size: Adult Large)   Pulse 102   Temp 98.9  F (37.2  C) (Temporal)   Wt 78.5 kg (173 lb)   LMP 02/19/2024   SpO2 99%   BMI 29.93 kg/m    Body mass index is 29.93 kg/m .    Physical Examination:  GENERAL: healthy, alert and no distress  EYES: Eyes grossly normal to inspection, PERRL and conjunctivae and sclerae normal  EARS: ear canals and TM's normal  NOSE: congestion present, tenderness to palpation over frontal and maxillary sinuses bilaterally  THROAT: no ulcers or lesions, no posterior oropharyngeal erythema, tonsils absent, uvula is midline  NECK: no adenopathy, no asymmetry, masses, or scars and thyroid normal to palpation  RESP: lungs clear to auscultation - no rales, rhonchi or wheezes  CV: regular rate and rhythm, normal S1 S2, no S3 or S4, no murmur, click or rub, no peripheral edema   ABDOMEN: soft and non-tender  MS: no gross musculoskeletal defects noted, no edema  SKIN: no suspicious lesions or rashes  NEURO: Normal strength and tone, mentation intact and speech normal  PSYCH: mentation appears normal, affect normal/bright    Labs reviewed.  Results for orders placed or performed in visit on 04/01/24   Strep A (BFP)     Status: None   Result Value Ref Range    STREP A Negative Negative   COVID-19 (BFP)     Status: None   Result Value Ref Range    COVID-19 Negative    Influenza A and B (BFP)      Status: None   Result Value Ref Range    Influenza A neg neg    Influenza B neg neg

## 2024-04-01 NOTE — NURSING NOTE
Chief Complaint   Patient presents with    URI     Sinus pressure started 1 week, congestion, facial pain into head and neck, sore throat, fever of 102 yesterday     Pre-visit Screening:  Immunizations:  up to date  Colonoscopy:  NA  Mammogram: NA  Asthma Action Test/Plan:  NA  PHQ9:  NA  GAD7:  NA  Questioned patient about current smoking habits Pt. has never smoked.  Ok to leave detailed message on voice mail for today's visit only Yes, phone # [phone]

## 2024-06-17 PROBLEM — Z76.89 HEALTH CARE HOME: Status: RESOLVED | Noted: 2021-11-17 | Resolved: 2024-06-17

## 2024-07-21 ENCOUNTER — HEALTH MAINTENANCE LETTER (OUTPATIENT)
Age: 24
End: 2024-07-21

## 2024-07-29 ENCOUNTER — OFFICE VISIT (OUTPATIENT)
Dept: FAMILY MEDICINE | Facility: CLINIC | Age: 24
End: 2024-07-29

## 2024-07-29 VITALS
DIASTOLIC BLOOD PRESSURE: 70 MMHG | HEIGHT: 63 IN | TEMPERATURE: 97.7 F | OXYGEN SATURATION: 99 % | RESPIRATION RATE: 18 BRPM | HEART RATE: 59 BPM | SYSTOLIC BLOOD PRESSURE: 102 MMHG | BODY MASS INDEX: 30.3 KG/M2 | WEIGHT: 171 LBS

## 2024-07-29 DIAGNOSIS — J45.31 MILD PERSISTENT ASTHMA WITH EXACERBATION: ICD-10-CM

## 2024-07-29 DIAGNOSIS — Z01.818 PRE-OPERATIVE GENERAL PHYSICAL EXAMINATION: Primary | ICD-10-CM

## 2024-07-29 DIAGNOSIS — D50.0 IRON DEFICIENCY ANEMIA DUE TO CHRONIC BLOOD LOSS: ICD-10-CM

## 2024-07-29 DIAGNOSIS — M25.552 HIP PAIN, LEFT: ICD-10-CM

## 2024-07-29 DIAGNOSIS — K90.0 CELIAC DISEASE: ICD-10-CM

## 2024-07-29 LAB
% GRANULOCYTES: 52.9 %
HCT VFR BLD AUTO: 37.7 % (ref 35–47)
HEMOGLOBIN: 12.2 G/DL (ref 11.7–15.7)
LYMPHOCYTES NFR BLD AUTO: 35.7 %
MCH RBC QN AUTO: 29 PG (ref 26–33)
MCHC RBC AUTO-ENTMCNC: 32.4 G/DL (ref 31–36)
MCV RBC AUTO: 89.5 FL (ref 78–100)
MONOCYTES NFR BLD AUTO: 11.4 %
PLATELET COUNT - QUEST: 283 10^9/L (ref 150–375)
RBC # BLD AUTO: 4.21 10*12/L (ref 3.8–5.2)
WBC # BLD AUTO: 7.3 10*9/L (ref 4–11)

## 2024-07-29 PROCEDURE — 99214 OFFICE O/P EST MOD 30 MIN: CPT | Performed by: STUDENT IN AN ORGANIZED HEALTH CARE EDUCATION/TRAINING PROGRAM

## 2024-07-29 PROCEDURE — 36415 COLL VENOUS BLD VENIPUNCTURE: CPT | Performed by: STUDENT IN AN ORGANIZED HEALTH CARE EDUCATION/TRAINING PROGRAM

## 2024-07-29 PROCEDURE — 85025 COMPLETE CBC W/AUTO DIFF WBC: CPT | Performed by: STUDENT IN AN ORGANIZED HEALTH CARE EDUCATION/TRAINING PROGRAM

## 2024-07-29 PROCEDURE — 82728 ASSAY OF FERRITIN: CPT | Mod: 90 | Performed by: STUDENT IN AN ORGANIZED HEALTH CARE EDUCATION/TRAINING PROGRAM

## 2024-07-29 NOTE — LETTER
7/29/2024      Deirdre ALLY Benny  [address]  [facility] [address]        Preoperative Evaluation  [facility]  [address]  [address]  [facility] [address]  Phone: [phone]  Fax: [phone]  Primary Provider: RUTH PRETTY MD  Pre-op Performing Provider: RUTH PRETTY MD  Jul 29, 2024 7/29/2024   Surgical Information   What procedure is being done? Left hip surgery   Facility or Hospital where procedure/surgery will be performed: [facility]   Who is doing the procedure / surgery? Dr. Monge   Date of surgery / procedure: 8/13/24   Time of surgery / procedure: TBD   Where do you plan to recover after surgery? at home with family      Fax number for surgical facility:     Assessment & Plan    The proposed surgical procedure is considered INTERMEDIATE risk.      ICD-10-CM    1. Pre-operative general physical examination  Z01.818       2. Hip pain, left  M25.552       3. Mild persistent asthma with exacerbation  J45.31       4. Celiac disease  K90.0       5. Iron deficiency anemia due to chronic blood loss  D50.0 HEMOGRAM PLATELET DIFF (BFP)     FERRITIN ([facility])            - No identified additional risk factors other than previously addressed    Antiplatelet or Anticoagulation Medication Instructions   - Patient is on no antiplatelet or anticoagulation medications.    Additional Medication Instructions  Take all scheduled medications on the day of surgery    Recommendation  Approval given to proceed with proposed procedure, without further diagnostic evaluation.  Follow-up to address anxiety medication postop-virtual visit ok if easier.     Ruth Pretty MD, [facility]         Chris De Oliveira is a 24 year old, presenting for the following:  Pre-Op Exam (DOS 8/13/24, left hip surgery being done by Dr. Monge with [facility], surgery being held at [facility] )    HPI related to upcoming procedure:  left hip YURIY. In good health, asthma well controlled.     1. No - Have you ever had a heart attack or stroke?  2. No - Have you ever had surgery on your heart or blood vessels, such as a stent, coronary (heart) bypass, or surgery on an artery in the head, neck, heart, or legs?  3. No - Do you have chest pain when you are physically active?  4. No - Do you have a history of heart failure?  5. No - Do you currently have a cold, bronchitis, or symptoms of other respiratory (head and chest) infections?  6. No - Do you have a cough, shortness of breath, or wheezing?  7. No - Do you or anyone in your family have a history of blood clots?  8. No - Do you or anyone in your family have a serious bleeding problem, such as long-lasting bleeding after surgeries or cuts?  9. Yes - Have you ever had anemia or been told to take iron pills? Iron def  10. No - Have you had any abnormal blood loss such as black, tarry or bloody stools, or abnormal vaginal bleeding?  11. No - Have you ever had a blood transfusion?  12. Yes - Are you willing to have a blood transfusion if it is medically needed before, during, or after your surgery?  13. No - Have you or anyone in your family ever had problems with anesthesia (sedation for surgery)?  14. No - Do you have sleep apnea, excessive snoring, or daytime drowsiness?   15. No - Do you have any artifical heart valves or other implanted medical devices, such as a pacemaker, defibrillator, or continuous glucose monitor?  16. No - Do you have any artifical joints?  17. No - Are you allergic to latex?  18. No - Is there any chance that you may be pregnant?    Health Care Directive  Patient does not have a Health Care Directive or Living Will: Discussed advance care planning with patient; however, patient declined at this time.    Status of Chronic Conditions:  See problem list for active medical problems.  Problems all longstanding and stable, except as noted/documented.  See ROS for pertinent  symptoms related to these conditions.    Patient Active Problem List    Diagnosis Date Noted     PCOS (polycystic ovarian syndrome) 03/12/2022     Priority: Medium     Celiac disease 05/23/2019     Priority: Medium     Formatting of this note might be different from the original.  see blood tests and colonoscopy/endoscopy       Thyroid dysfunction 12/21/2017     Priority: Medium     Formatting of this note might be different from the original.  retest the TSH in 2 months, and if still normal, then 6-12 months later.       Mild intermittent asthma without complication 09/21/2016     Priority: Medium     Irregular menses 07/24/2015     Priority: Medium     Iron deficiency anemia due to chronic blood loss 10/08/2014     Priority: Medium     Vitamin D deficiency 10/08/2014     Priority: Medium     Allergic rhinitis due to animal hair and dander 06/16/2010     Priority: Medium     ACP (advance care planning) 11/17/2021     Priority: Low      No past medical history on file.  Past Surgical History:   Procedure Laterality Date     DENTAL SURGERY  2017     PODIATRY/FOOT & ANKLE SURGERY REFERRAL Bilateral 2014    Dr. Tompkins     TONSILLECTOMY, ADENOIDECTOMY, COMBINED  2002     Current Outpatient Medications   Medication Sig Dispense Refill     albuterol (PROAIR HFA/PROVENTIL HFA/VENTOLIN HFA) 108 (90 Base) MCG/ACT inhaler Inhale 2 puffs into the lungs every 4 hours as needed for shortness of breath, wheezing or cough 18 g 1     B Complex Vitamins (VITAMIN-B COMPLEX PO)        cetirizine (ZYRTEC) 5 MG tablet Take 5 mg by mouth daily       Cholecalciferol (VITAMIN D3 PO) Take 5,000 Units by mouth 2 times daily       DULoxetine (CYMBALTA) 30 MG capsule TAKE 1 CAPSULE BY MOUTH ONCE A DAY -TAKE WITH 60MG DAILY, 90 MG/DAY       DULoxetine (CYMBALTA) 60 MG capsule TAKE ONE CAPSULE BY MOUTH EVERY MORNING -START AFTER 5 DAYS OF 30MG DAILY       Ferrous Gluconate (IRON 27 PO)        fluticasone-salmeterol (AIRDUO RESPICLICK) 113-14  MCG/ACT inhaler Inhale 1 puff into the lungs 2 times daily 1 each 1     hydrOXYzine (ATARAX) 25 MG tablet Take 1 tablet (25 mg) by mouth 3 times daily as needed for anxiety 30 tablet 0     Probiotic Product (PROBIOTIC-10 PO)        triamcinolone (NASACORT) 55 MCG/ACT nasal aerosol Spray 2 sprays into both nostrils daily       VESTURA 3-0.02 MG tablet Take 1 tablet by mouth daily         Allergies   Allergen Reactions     Other Environmental Allergy Hives and Itching     POLLEN, TREES     Amoxicillin-Pot Clavulanate GI Disturbance     Cats      Gluten Meal      Morphine Nausea and Vomiting     Dog Epithelium (Canis Lupus Familiaris) Hives and Itching        Social History     Tobacco Use     Smoking status: Never     Passive exposure: Never     Smokeless tobacco: Never   Substance Use Topics     Alcohol use: Yes     Alcohol/week: 3.0 standard drinks of alcohol     Types: 3 Standard drinks or equivalent per week     Comment: weekends     Family History   Problem Relation Age of Onset     No Known Problems Mother      Hypothyroidism Father      Hypertension Father      Anxiety Disorder Sister      Other - See Comments Sister         RSV     Asthma Sister      Obsessive Compulsive Disorder Sister      Breast Cancer Maternal Grandmother 60     Diabetes Type 2  Maternal Grandmother      Diabetes Type 2  Maternal Grandfather      Alzheimer Disease Maternal Grandfather      Colon Cancer Paternal Grandmother 70     Anxiety Disorder Paternal Grandmother      Alcoholism Paternal Grandfather      Anesthesia Reaction No family hx of      Deep Vein Thrombosis (DVT) No family hx of      Bleeding Disorder No family hx of      History   Drug Use Unknown             Review of Systems  12 point ROS performed and negative for new concerns except as mentioned above     Objective   /70 (BP Location: Left arm, Patient Position: Sitting, Cuff Size: Adult Large)   Pulse 59   Temp 97.7  F (36.5  C) (Temporal)   Resp 18   Ht 1.6 m  "(5' 3\")   Wt 77.6 kg (171 lb)   LMP 07/20/2024   SpO2 99%   BMI 30.29 kg/m     Estimated body mass index is 30.29 kg/m  as calculated from the following:    Height as of this encounter: 1.6 m (5' 3\").    Weight as of this encounter: 77.6 kg (171 lb).  Physical Exam  GENERAL: alert and no distress  EYES: Eyes grossly normal to inspection, PERRL and conjunctivae and sclerae normal  NECK: no adenopathy, no asymmetry, masses, or scars  RESP: lungs clear to auscultation - no rales, rhonchi or wheezes  CV: regular rate and rhythm, normal S1 S2, no S3 or S4, no murmur, click or rub, no peripheral edema  MS: no gross musculoskeletal defects noted, no edema  SKIN: no suspicious lesions or rashes  NEURO: Normal strength and tone, mentation intact and speech normal  PSYCH: mentation appears normal, affect normal/bright    Recent Labs   Lab Test 11/06/23  0000   HGB 12.4           Diagnostics  Results for orders placed or performed in visit on 07/29/24   HEMOGRAM PLATELET DIFF (BFP)     Status: None   Result Value Ref Range    WBC 7.3 4.0 - 11 10*9/L    RBC Count 4.21 3.8 - 5.2 10*12/L    Hemoglobin 12.2 11.7 - 15.7 g/dL    Hematocrit 37.7 35.0 - 47.0 %    MCV 89.5 78 - 100 fL    MCH 29.0 26 - 33 pg    MCHC 32.4 31 - 36 g/dL    Platelet Count 283 150 - 375 10^9/L    % Granulocytes 52.9 %    % Lymphocytes 35.7 %    % Monocytes 11.4 %   FERRITIN ([facility])     Status: Abnormal   Result Value Ref Range    Ferritin 10 (L) 16 - 154 ng/mL       No EKG required, no history of coronary heart disease, significant arrhythmia, peripheral arterial disease or other structural heart disease.    Revised Cardiac Risk Index (RCRI)  The patient has the following serious cardiovascular risks for perioperative complications:   - No serious cardiac risks = 0 points     RCRI Interpretation: 0 points: Class I (very low risk - 0.4% complication rate)         Signed Electronically by: RUTH PRETTY MD  A copy of this evaluation report is " provided to the requesting physician.          Sincerely,        RUTH PRETTY MD

## 2024-07-29 NOTE — NURSING NOTE
Chief Complaint   Patient presents with    Pre-Op Exam     DOS 8/13/24, left hip surgery being done by Dr. Monge with [facility], surgery being held at [facility]

## 2024-07-29 NOTE — PROGRESS NOTES
Preoperative Evaluation  [facility]  [address]  [address]  [facility] [address]  Phone: [phone]  Fax: [phone]  Primary Provider: RUTH PRETTY MD  Pre-op Performing Provider: RUTH PRETTY MD  Jul 29, 2024 7/29/2024   Surgical Information   What procedure is being done? Left hip surgery   Facility or Hospital where procedure/surgery will be performed: [facility]   Who is doing the procedure / surgery? Dr. Monge   Date of surgery / procedure: 8/13/24   Time of surgery / procedure: TBD   Where do you plan to recover after surgery? at home with family      Fax number for surgical facility:     Assessment & Plan     The proposed surgical procedure is considered INTERMEDIATE risk.      ICD-10-CM    1. Pre-operative general physical examination  Z01.818       2. Hip pain, left  M25.552       3. Mild persistent asthma with exacerbation  J45.31       4. Celiac disease  K90.0       5. Iron deficiency anemia due to chronic blood loss  D50.0 HEMOGRAM PLATELET DIFF (BFP)     FERRITIN ([facility])            - No identified additional risk factors other than previously addressed    Antiplatelet or Anticoagulation Medication Instructions   - Patient is on no antiplatelet or anticoagulation medications.    Additional Medication Instructions  Take all scheduled medications on the day of surgery    Recommendation  Approval given to proceed with proposed procedure, without further diagnostic evaluation.  Follow-up to address anxiety medication postop-virtual visit ok if easier.     Ruth Pretty MD, [facility]         Chris De Oliveira is a 24 year old, presenting for the following:  Pre-Op Exam (DOS 8/13/24, left hip surgery being done by Dr. Monge with [facility], surgery being held at [facility] )    HPI related to upcoming procedure: left hip YURIY. In good health, asthma well controlled.     1. No - Have you ever had a  heart attack or stroke?  2. No - Have you ever had surgery on your heart or blood vessels, such as a stent, coronary (heart) bypass, or surgery on an artery in the head, neck, heart, or legs?  3. No - Do you have chest pain when you are physically active?  4. No - Do you have a history of heart failure?  5. No - Do you currently have a cold, bronchitis, or symptoms of other respiratory (head and chest) infections?  6. No - Do you have a cough, shortness of breath, or wheezing?  7. No - Do you or anyone in your family have a history of blood clots?  8. No - Do you or anyone in your family have a serious bleeding problem, such as long-lasting bleeding after surgeries or cuts?  9. Yes - Have you ever had anemia or been told to take iron pills? Iron def  10. No - Have you had any abnormal blood loss such as black, tarry or bloody stools, or abnormal vaginal bleeding?  11. No - Have you ever had a blood transfusion?  12. Yes - Are you willing to have a blood transfusion if it is medically needed before, during, or after your surgery?  13. No - Have you or anyone in your family ever had problems with anesthesia (sedation for surgery)?  14. No - Do you have sleep apnea, excessive snoring, or daytime drowsiness?   15. No - Do you have any artifical heart valves or other implanted medical devices, such as a pacemaker, defibrillator, or continuous glucose monitor?  16. No - Do you have any artifical joints?  17. No - Are you allergic to latex?  18. No - Is there any chance that you may be pregnant?    Health Care Directive  Patient does not have a Health Care Directive or Living Will: Discussed advance care planning with patient; however, patient declined at this time.    Status of Chronic Conditions:  See problem list for active medical problems.  Problems all longstanding and stable, except as noted/documented.  See ROS for pertinent symptoms related to these conditions.    Patient Active Problem List    Diagnosis Date  Noted    PCOS (polycystic ovarian syndrome) 03/12/2022     Priority: Medium    Celiac disease 05/23/2019     Priority: Medium     Formatting of this note might be different from the original.  see blood tests and colonoscopy/endoscopy      Thyroid dysfunction 12/21/2017     Priority: Medium     Formatting of this note might be different from the original.  retest the TSH in 2 months, and if still normal, then 6-12 months later.      Mild intermittent asthma without complication 09/21/2016     Priority: Medium    Irregular menses 07/24/2015     Priority: Medium    Iron deficiency anemia due to chronic blood loss 10/08/2014     Priority: Medium    Vitamin D deficiency 10/08/2014     Priority: Medium    Allergic rhinitis due to animal hair and dander 06/16/2010     Priority: Medium    ACP (advance care planning) 11/17/2021     Priority: Low      No past medical history on file.  Past Surgical History:   Procedure Laterality Date    DENTAL SURGERY  2017    PODIATRY/FOOT & ANKLE SURGERY REFERRAL Bilateral 2014    Dr. Tompkins    TONSILLECTOMY, ADENOIDECTOMY, COMBINED  2002     Current Outpatient Medications   Medication Sig Dispense Refill    albuterol (PROAIR HFA/PROVENTIL HFA/VENTOLIN HFA) 108 (90 Base) MCG/ACT inhaler Inhale 2 puffs into the lungs every 4 hours as needed for shortness of breath, wheezing or cough 18 g 1    B Complex Vitamins (VITAMIN-B COMPLEX PO)       cetirizine (ZYRTEC) 5 MG tablet Take 5 mg by mouth daily      Cholecalciferol (VITAMIN D3 PO) Take 5,000 Units by mouth 2 times daily      DULoxetine (CYMBALTA) 30 MG capsule TAKE 1 CAPSULE BY MOUTH ONCE A DAY -TAKE WITH 60MG DAILY, 90 MG/DAY      DULoxetine (CYMBALTA) 60 MG capsule TAKE ONE CAPSULE BY MOUTH EVERY MORNING -START AFTER 5 DAYS OF 30MG DAILY      Ferrous Gluconate (IRON 27 PO)       fluticasone-salmeterol (AIRDUO RESPICLICK) 113-14 MCG/ACT inhaler Inhale 1 puff into the lungs 2 times daily 1 each 1    hydrOXYzine (ATARAX) 25 MG tablet Take  "1 tablet (25 mg) by mouth 3 times daily as needed for anxiety 30 tablet 0    Probiotic Product (PROBIOTIC-10 PO)       triamcinolone (NASACORT) 55 MCG/ACT nasal aerosol Spray 2 sprays into both nostrils daily      VESTURA 3-0.02 MG tablet Take 1 tablet by mouth daily         Allergies   Allergen Reactions    Other Environmental Allergy Hives and Itching     POLLEN, TREES    Amoxicillin-Pot Clavulanate GI Disturbance    Cats     Gluten Meal     Morphine Nausea and Vomiting    Dog Epithelium (Canis Lupus Familiaris) Hives and Itching        Social History     Tobacco Use    Smoking status: Never     Passive exposure: Never    Smokeless tobacco: Never   Substance Use Topics    Alcohol use: Yes     Alcohol/week: 3.0 standard drinks of alcohol     Types: 3 Standard drinks or equivalent per week     Comment: weekends     Family History   Problem Relation Age of Onset    No Known Problems Mother     Hypothyroidism Father     Hypertension Father     Anxiety Disorder Sister     Other - See Comments Sister         RSV    Asthma Sister     Obsessive Compulsive Disorder Sister     Breast Cancer Maternal Grandmother 60    Diabetes Type 2  Maternal Grandmother     Diabetes Type 2  Maternal Grandfather     Alzheimer Disease Maternal Grandfather     Colon Cancer Paternal Grandmother 70    Anxiety Disorder Paternal Grandmother     Alcoholism Paternal Grandfather     Anesthesia Reaction No family hx of     Deep Vein Thrombosis (DVT) No family hx of     Bleeding Disorder No family hx of      History   Drug Use Unknown             Review of Systems  12 point ROS performed and negative for new concerns except as mentioned above     Objective    /70 (BP Location: Left arm, Patient Position: Sitting, Cuff Size: Adult Large)   Pulse 59   Temp 97.7  F (36.5  C) (Temporal)   Resp 18   Ht 1.6 m (5' 3\")   Wt 77.6 kg (171 lb)   LMP 07/20/2024   SpO2 99%   BMI 30.29 kg/m     Estimated body mass index is 30.29 kg/m  as calculated " "from the following:    Height as of this encounter: 1.6 m (5' 3\").    Weight as of this encounter: 77.6 kg (171 lb).  Physical Exam  GENERAL: alert and no distress  EYES: Eyes grossly normal to inspection, PERRL and conjunctivae and sclerae normal  NECK: no adenopathy, no asymmetry, masses, or scars  RESP: lungs clear to auscultation - no rales, rhonchi or wheezes  CV: regular rate and rhythm, normal S1 S2, no S3 or S4, no murmur, click or rub, no peripheral edema  MS: no gross musculoskeletal defects noted, no edema  SKIN: no suspicious lesions or rashes  NEURO: Normal strength and tone, mentation intact and speech normal  PSYCH: mentation appears normal, affect normal/bright    Recent Labs   Lab Test 11/06/23  0000   HGB 12.4           Diagnostics  Results for orders placed or performed in visit on 07/29/24   HEMOGRAM PLATELET DIFF (BFP)     Status: None   Result Value Ref Range    WBC 7.3 4.0 - 11 10*9/L    RBC Count 4.21 3.8 - 5.2 10*12/L    Hemoglobin 12.2 11.7 - 15.7 g/dL    Hematocrit 37.7 35.0 - 47.0 %    MCV 89.5 78 - 100 fL    MCH 29.0 26 - 33 pg    MCHC 32.4 31 - 36 g/dL    Platelet Count 283 150 - 375 10^9/L    % Granulocytes 52.9 %    % Lymphocytes 35.7 %    % Monocytes 11.4 %   FERRITIN ([facility])     Status: Abnormal   Result Value Ref Range    Ferritin 10 (L) 16 - 154 ng/mL       No EKG required, no history of coronary heart disease, significant arrhythmia, peripheral arterial disease or other structural heart disease.    Revised Cardiac Risk Index (RCRI)  The patient has the following serious cardiovascular risks for perioperative complications:   - No serious cardiac risks = 0 points     RCRI Interpretation: 0 points: Class I (very low risk - 0.4% complication rate)         Signed Electronically by: RUTH PRETTY MD  A copy of this evaluation report is provided to the requesting physician.      "

## 2024-07-30 LAB — FERRITIN SERPL-MCNC: 10 NG/ML (ref 16–154)

## 2024-10-14 ENCOUNTER — OFFICE VISIT (OUTPATIENT)
Dept: FAMILY MEDICINE | Facility: CLINIC | Age: 24
End: 2024-10-14

## 2024-10-14 VITALS
TEMPERATURE: 97.4 F | BODY MASS INDEX: 25.08 KG/M2 | SYSTOLIC BLOOD PRESSURE: 102 MMHG | WEIGHT: 141.6 LBS | HEART RATE: 76 BPM | OXYGEN SATURATION: 99 % | DIASTOLIC BLOOD PRESSURE: 68 MMHG

## 2024-10-14 DIAGNOSIS — F41.1 GAD (GENERALIZED ANXIETY DISORDER): ICD-10-CM

## 2024-10-14 DIAGNOSIS — J45.31 MILD PERSISTENT ASTHMA WITH EXACERBATION: ICD-10-CM

## 2024-10-14 DIAGNOSIS — Z23 NEED FOR VACCINATION: Primary | ICD-10-CM

## 2024-10-14 PROCEDURE — 90656 IIV3 VACC NO PRSV 0.5 ML IM: CPT | Performed by: STUDENT IN AN ORGANIZED HEALTH CARE EDUCATION/TRAINING PROGRAM

## 2024-10-14 PROCEDURE — 90471 IMMUNIZATION ADMIN: CPT | Performed by: STUDENT IN AN ORGANIZED HEALTH CARE EDUCATION/TRAINING PROGRAM

## 2024-10-14 PROCEDURE — 99214 OFFICE O/P EST MOD 30 MIN: CPT | Mod: 25 | Performed by: STUDENT IN AN ORGANIZED HEALTH CARE EDUCATION/TRAINING PROGRAM

## 2024-10-14 RX ORDER — DOCUSATE SODIUM 50MG AND SENNOSIDES 8.6MG 8.6; 5 MG/1; MG/1
TABLET, FILM COATED ORAL
COMMUNITY
Start: 2024-08-09 | End: 2024-10-14

## 2024-10-14 RX ORDER — HYDROXYZINE HYDROCHLORIDE 25 MG/1
25-50 TABLET, FILM COATED ORAL 3 TIMES DAILY PRN
Qty: 100 TABLET | Refills: 0 | Status: SHIPPED | OUTPATIENT
Start: 2024-10-14

## 2024-10-14 RX ORDER — FLUTICASONE PROPIONATE AND SALMETEROL 113; 14 UG/1; UG/1
1 POWDER, METERED RESPIRATORY (INHALATION) 2 TIMES DAILY
Qty: 1 EACH | Refills: 1 | Status: SHIPPED | OUTPATIENT
Start: 2024-10-14

## 2024-10-14 ASSESSMENT — ANXIETY QUESTIONNAIRES
7. FEELING AFRAID AS IF SOMETHING AWFUL MIGHT HAPPEN: NOT AT ALL
2. NOT BEING ABLE TO STOP OR CONTROL WORRYING: SEVERAL DAYS
1. FEELING NERVOUS, ANXIOUS, OR ON EDGE: SEVERAL DAYS
3. WORRYING TOO MUCH ABOUT DIFFERENT THINGS: NOT AT ALL
GAD7 TOTAL SCORE: 4
IF YOU CHECKED OFF ANY PROBLEMS ON THIS QUESTIONNAIRE, HOW DIFFICULT HAVE THESE PROBLEMS MADE IT FOR YOU TO DO YOUR WORK, TAKE CARE OF THINGS AT HOME, OR GET ALONG WITH OTHER PEOPLE: VERY DIFFICULT
5. BEING SO RESTLESS THAT IT IS HARD TO SIT STILL: NOT AT ALL
GAD7 TOTAL SCORE: 4
6. BECOMING EASILY ANNOYED OR IRRITABLE: SEVERAL DAYS

## 2024-10-14 ASSESSMENT — PATIENT HEALTH QUESTIONNAIRE - PHQ9
SUM OF ALL RESPONSES TO PHQ QUESTIONS 1-9: 2
5. POOR APPETITE OR OVEREATING: SEVERAL DAYS

## 2024-10-14 NOTE — NURSING NOTE
Chief Complaint   Patient presents with    Recheck Medication     Follow up for stopping duloxetine, no longer taking. Pt does not feel any different, still having issues with anxiety.      Pre-visit Screening:  Immunizations:  not up to date - will do flu shot  Colonoscopy:    Mammogram:   Asthma Action Test/Plan:    PHQ9:  given  GAD7:  given  Questioned patient about current smoking habits Pt. has never smoked.  Ok to leave detailed message on voice mail for today's visit only yes, phone # [phone] (home)

## 2024-10-14 NOTE — PROGRESS NOTES
Assessment & Plan     1. JERRY (generalized anxiety disorder)  Reviewed options and given lack of success with multiple SSRI/SNRI trials will seek out Psychiatry consult. Also encouraged to establish long term with therapist.   - hydrOXYzine HCl (ATARAX) 25 MG tablet; Take 1-2 tablets (25-50 mg) by mouth 3 times daily as needed for anxiety.  Dispense: 100 tablet; Refill: 0    2. Mild persistent asthma with exacerbation  Well controlled, good rx pricing   - fluticasone-salmeterol (AIRDUO RESPICLICK) 113-14 MCG/ACT inhaler; Inhale 1 puff into the lungs 2 times daily.  Dispense: 1 each; Refill: 1    3. Need for vaccination  Flu shot      Patient Instructions   Continue hydroxyzine as needed, can take 1-4 pills at a time as needed     Please look for new mental health providers in insurance network - can send referral if needed     GoodRx discount for inhaler     Reasons to follow-up sooner or seek emergent care reviewed.     35 minutes spent on the date of the encounter doing chart review, history and exam, documentation and further activities per the note      Rickey Renee MD, [facility]      Subjective     Deirdre Zapata is a 24 year old female who presents to clinic today for the following health issues:    HPI   Chief Complaint   Patient presents with    Recheck Medication     Follow up for stopping duloxetine, no longer taking. Pt does not feel any different, still having issues with anxiety.       Recovering well from hip surgery   Asthma has been generally well controlled, only using airduo periodically due to costs    Anxiety   How are you doing with your anxiety since your last visit?   Unintentionally stopped duloxetine after surgery and never restarted    Feels more spiraling thoughts but overall doesn't think   Prior meds: prozac (GI issues), lexapro, sertraline, duloxetine  No current therapist  Do you have any concerns with your use of alcohol or other drugs? No    Social  History     Tobacco Use    Smoking status: Never     Passive exposure: Never    Smokeless tobacco: Never   Substance Use Topics    Alcohol use: Yes     Alcohol/week: 3.0 standard drinks of alcohol     Types: 3 Standard drinks or equivalent per week     Comment: weekends    Drug use: Never         8/7/2023     3:38 PM 11/6/2023     3:40 PM 10/14/2024     4:12 PM   JERRY-7 SCORE   Total Score 5 10 4         8/7/2023     3:38 PM 11/6/2023     3:40 PM 10/14/2024     4:12 PM   PHQ   PHQ-9 Total Score 2 7 2   Q9: Thoughts of better off dead/self-harm past 2 weeks Not at all Not at all Not at all           Objective    /68 (BP Location: Left arm, Patient Position: Sitting, Cuff Size: Adult Large)   Pulse 76   Temp 97.4  F (36.3  C) (Temporal)   Wt 64.2 kg (141 lb 9.6 oz)   LMP 10/07/2024 (Approximate)   SpO2 99%   BMI 25.08 kg/m    Body mass index is 25.08 kg/m .  Alert, NAD  NC/AT  Sclerae anicteric  Regular  Resp nonlabored  Skin warm and dry  No focal neuro deficits. Speech intact. Normal gait.  Appropriate affect

## 2024-10-24 ENCOUNTER — OFFICE VISIT (OUTPATIENT)
Dept: FAMILY MEDICINE | Facility: CLINIC | Age: 24
End: 2024-10-24

## 2024-10-24 VITALS
HEART RATE: 73 BPM | TEMPERATURE: 97.7 F | DIASTOLIC BLOOD PRESSURE: 68 MMHG | SYSTOLIC BLOOD PRESSURE: 104 MMHG | OXYGEN SATURATION: 99 % | BODY MASS INDEX: 30.82 KG/M2 | WEIGHT: 174 LBS

## 2024-10-24 DIAGNOSIS — H65.193 ACUTE EFFUSION OF BOTH MIDDLE EARS: Primary | ICD-10-CM

## 2024-10-24 PROCEDURE — 99213 OFFICE O/P EST LOW 20 MIN: CPT | Performed by: PHYSICIAN ASSISTANT

## 2024-10-24 NOTE — PROGRESS NOTES
Assessment & Plan     Acute effusion of both middle ears-no infection YSMONE, suspect effusion with some ETD  Educated time will heal this issue, RTC if any pain or worsening-recheck for OM  Flonase for 1 month          Follow up as needed    No follow-ups on file.    Subjective   Yennifer is a 24 year old, presenting for the following health issues:  Ear Problem (Left ear pain and pressure for the last week, sounds of crackling and popping when turning her head side to side, some dizziness  )    HPI     Pt presents with Worsening pain in L ear, now spreading to R. Pain and crackling sensation with opening mouth, turning head, now getting a little dizziness with this. Present for last 5 days. Had COVID 1 month ago. Does get chronic sinus infections-this feels different than her sinus infections. Trying Sudafed-no real improvement in symptoms.              Review of Systems  Constitutional, HEENT, cardiovascular, pulmonary, gi and gu systems are negative, except as otherwise noted.      Objective    /68 (BP Location: Right arm, Patient Position: Sitting, Cuff Size: Adult Large)   Pulse 73   Temp 97.7  F (36.5  C) (Temporal)   Wt 78.9 kg (174 lb)   LMP 10/07/2024 (Approximate)   SpO2 99%   BMI 30.82 kg/m    Body mass index is 30.82 kg/m .  Physical Exam   GENERAL: alert and no distress  HENT: normal cephalic/atraumatic, both ears: clear effusion, nose and mouth without ulcers or lesions, oropharynx clear, and oral mucous membranes moist  NECK: no adenopathy, no asymmetry, masses, or scars  RESP: lungs clear to auscultation - no rales, rhonchi or wheezes  CV: regular rate and rhythm, normal S1 S2, no S3 or S4, no murmur, click or rub, no peripheral edema  ABDOMEN: soft, nontender, no hepatosplenomegaly, no masses and bowel sounds normal  MS: no gross musculoskeletal defects noted, no edema  NEURO: Normal strength and tone, mentation intact and speech normal  PSYCH: mentation appears normal, affect  normal/bright            Signed Electronically by: Tra Vieira PA-C

## 2025-02-03 ENCOUNTER — OFFICE VISIT (OUTPATIENT)
Dept: FAMILY MEDICINE | Facility: CLINIC | Age: 25
End: 2025-02-03

## 2025-02-03 VITALS
BODY MASS INDEX: 30.86 KG/M2 | OXYGEN SATURATION: 98 % | DIASTOLIC BLOOD PRESSURE: 72 MMHG | TEMPERATURE: 97.3 F | WEIGHT: 174.2 LBS | SYSTOLIC BLOOD PRESSURE: 108 MMHG | HEART RATE: 101 BPM

## 2025-02-03 DIAGNOSIS — J10.1 INFLUENZA A: ICD-10-CM

## 2025-02-03 DIAGNOSIS — J45.31 MILD PERSISTENT ASTHMA WITH EXACERBATION: ICD-10-CM

## 2025-02-03 DIAGNOSIS — R05.1 ACUTE COUGH: Primary | ICD-10-CM

## 2025-02-03 LAB
COVID-19: NEGATIVE
FLUAV AG UPPER RESP QL IA.RAPID: ABNORMAL
FLUBV AG UPPER RESP QL IA.RAPID: ABNORMAL

## 2025-02-03 PROCEDURE — 87635 SARS-COV-2 COVID-19 AMP PRB: CPT | Performed by: STUDENT IN AN ORGANIZED HEALTH CARE EDUCATION/TRAINING PROGRAM

## 2025-02-03 PROCEDURE — 87804 INFLUENZA ASSAY W/OPTIC: CPT | Mod: 59 | Performed by: STUDENT IN AN ORGANIZED HEALTH CARE EDUCATION/TRAINING PROGRAM

## 2025-02-03 PROCEDURE — 99213 OFFICE O/P EST LOW 20 MIN: CPT | Performed by: STUDENT IN AN ORGANIZED HEALTH CARE EDUCATION/TRAINING PROGRAM

## 2025-02-03 PROCEDURE — G2211 COMPLEX E/M VISIT ADD ON: HCPCS | Performed by: STUDENT IN AN ORGANIZED HEALTH CARE EDUCATION/TRAINING PROGRAM

## 2025-02-03 RX ORDER — FLUTICASONE PROPIONATE AND SALMETEROL 113; 14 UG/1; UG/1
1 POWDER, METERED RESPIRATORY (INHALATION) 2 TIMES DAILY
Qty: 1 EACH | Refills: 1 | Status: SHIPPED | OUTPATIENT
Start: 2025-02-03

## 2025-02-03 RX ORDER — FLUOXETINE 10 MG/1
CAPSULE ORAL
COMMUNITY
Start: 2024-12-02

## 2025-02-03 RX ORDER — ALBUTEROL SULFATE 90 UG/1
2 INHALANT RESPIRATORY (INHALATION) EVERY 4 HOURS PRN
Qty: 18 G | Refills: 1 | Status: SHIPPED | OUTPATIENT
Start: 2025-02-03

## 2025-02-03 NOTE — PATIENT INSTRUCTIONS
Continue airduo    Try albuterol     Can use mucinex or robitussin as needed too    If not improving by end of week call and we can send in prednisone, [id number]. If worsening would need follow-up appt.     Results for orders placed or performed in visit on 02/03/25   COVID-19 ([facility])     Status: None   Result Value Ref Range    COVID-19 Negative    Influenza A and B ([facility])     Status: Abnormal   Result Value Ref Range    Influenza A POS (A) neg    Influenza B NEG neg

## 2025-02-03 NOTE — PROGRESS NOTES
Assessment & Plan       ICD-10-CM    1. Acute cough  R05.1 COVID-19 ([facility])     Influenza A and B ([facility])           There are no Patient Instructions on file for this visit.     Reasons to follow-up sooner or seek emergent care reviewed.     >*** minutes spent on the date of the encounter doing chart review, history and exam, documentation and further activities per the note      Rickey Renee MD, [facility]      Subjective     Deirdre Zapata is a 24 year old female who presents to clinic today for the following health issues:    HPI   Chief Complaint   Patient presents with    Cough     Was sick about a month ago with flu like symptoms. A week ago she started to get cough, SOB, chest and back pain, hurts to breathe and talk, did have a fever earlier last week.       {SUPERLIST (Optional):376825}  {additonal problems for provider to add (Optional):711058}        Objective    /72 (BP Location: Left arm, Patient Position: Sitting, Cuff Size: Adult Large)   Pulse 101   Temp 97.3  F (36.3  C) (Temporal)   Wt 79 kg (174 lb 3.2 oz)   SpO2 98%   BMI 30.86 kg/m    Body mass index is 30.86 kg/m .  Alert, NAD  NC/AT  Sclerae anicteric  Regular  Resp nonlabored  Skin warm and dry  No focal neuro deficits. Speech intact. Normal gait.  Appropriate affect    ***     Labs reviewed.  {Diagnostic Test Results (Optional):480362}

## 2025-02-03 NOTE — LETTER
February 3, 2025      Deirdre Zapata  [address]   [place] [address]        To Whom It May Concern:    Deirdre Zapata  was seen 2/3/2025.  Please excuse her from work through 2/5/25 due to illness.        Sincerely,        RUTH PRETTY MD    Electronically signed

## 2025-02-03 NOTE — NURSING NOTE
Chief Complaint   Patient presents with    Cough     Was sick about a month ago with flu like symptoms. A week ago she started to get cough, SOB, chest and back pain, hurts to breathe and talk, did have a fever earlier last week.      Pre-visit Screening:  Immunizations:  up to date  Colonoscopy:  na  Mammogram: na  Asthma Action Test/Plan:    PHQ9:    GAD7:    Questioned patient about current smoking habits Pt. has never smoked.  Ok to leave detailed message on voice mail for today's visit only yes, phone # [phone] (home)

## 2025-02-03 NOTE — PROGRESS NOTES
Assessment & Plan       ICD-10-CM    1. Acute cough  R05.1 COVID-19 ([facility])     Influenza A and B ([facility])      2. Mild persistent asthma with exacerbation  J45.31 albuterol (PROAIR HFA/PROVENTIL HFA/VENTOLIN HFA) 108 (90 Base) MCG/ACT inhaler     fluticasone-salmeterol (AIRDUO RESPICLICK) 113-14 MCG/ACT inhaler      3. Influenza A  J10.1          Outside tamiflu window, discussed supportive cares  Asthma seems reasonably controlled at this point, VSS, no wheezing. Trial albuterol, consider steroid if not improving by end of week.     Patient Instructions   Continue airduo    Try albuterol     Can use mucinex or robitussin as needed too    If not improving by end of week call and we can send in prednisone, [id number]. If worsening would need follow-up appt.     Results for orders placed or performed in visit on 02/03/25   COVID-19 ([facility])     Status: None   Result Value Ref Range    COVID-19 Negative    Influenza A and B ([facility])     Status: Abnormal   Result Value Ref Range    Influenza A POS (A) neg    Influenza B NEG neg         Reasons to follow-up sooner or seek emergent care reviewed.       Rickey Renee MD, [facility]      Subjective     Deirdre Zapata is a 24 year old female who presents to clinic today for the following health issues:    HPI   Chief Complaint   Patient presents with    Cough     A week ago she started to get cough, SOB, chest tightness, feverish.   Cough mainly dry.   No wheezing.   Hasn't tried albuterol.   No GI sx (except had gluten over weekend)            Objective    /72 (BP Location: Left arm, Patient Position: Sitting, Cuff Size: Adult Large)   Pulse 101   Temp 97.3  F (36.3  C) (Temporal)   Wt 79 kg (174 lb 3.2 oz)   SpO2 98%   BMI 30.86 kg/m    Body mass index is 30.86 kg/m .  Alert, NAD  NC/AT  Sclerae anicteric  RRR  Resp nonlabored CTAB  Skin warm and dry  No focal neuro deficits. Speech intact. Normal gait.  Appropriate affect       Labs  reviewed.  Results for orders placed or performed in visit on 02/03/25   COVID-19 ([facility])     Status: None   Result Value Ref Range    COVID-19 Negative    Influenza A and B ([facility])     Status: Abnormal   Result Value Ref Range    Influenza A POS (A) neg    Influenza B NEG neg

## 2025-03-20 ENCOUNTER — OFFICE VISIT (OUTPATIENT)
Dept: FAMILY MEDICINE | Facility: CLINIC | Age: 25
End: 2025-03-20

## 2025-03-20 VITALS
HEART RATE: 80 BPM | WEIGHT: 173.8 LBS | DIASTOLIC BLOOD PRESSURE: 70 MMHG | OXYGEN SATURATION: 99 % | TEMPERATURE: 97.4 F | SYSTOLIC BLOOD PRESSURE: 110 MMHG | BODY MASS INDEX: 30.79 KG/M2

## 2025-03-20 DIAGNOSIS — F41.1 GAD (GENERALIZED ANXIETY DISORDER): Primary | ICD-10-CM

## 2025-03-20 DIAGNOSIS — R79.0 LOW FERRITIN: ICD-10-CM

## 2025-03-20 DIAGNOSIS — J45.20 MILD INTERMITTENT ASTHMA WITHOUT COMPLICATION: ICD-10-CM

## 2025-03-20 DIAGNOSIS — R53.83 OTHER FATIGUE: ICD-10-CM

## 2025-03-20 DIAGNOSIS — E07.9 THYROID DYSFUNCTION: ICD-10-CM

## 2025-03-20 LAB
ERYTHROCYTE [DISTWIDTH] IN BLOOD BY AUTOMATED COUNT: 14 %
HCT VFR BLD AUTO: 38.7 % (ref 35–47)
HEMOGLOBIN: 11.9 G/DL (ref 11.7–15.7)
MCH RBC QN AUTO: 27.4 PG (ref 26–33)
MCHC RBC AUTO-ENTMCNC: 30.7 G/DL (ref 31–36)
MCV RBC AUTO: 89 FL (ref 78–100)
PLATELET COUNT - QUEST: 232 10^9/L (ref 150–375)
RBC # BLD AUTO: 4.35 10*12/L (ref 3.8–5.2)
WBC # BLD AUTO: 7.5 10*9/L (ref 4–11)

## 2025-03-20 PROCEDURE — 82728 ASSAY OF FERRITIN: CPT | Mod: 90 | Performed by: STUDENT IN AN ORGANIZED HEALTH CARE EDUCATION/TRAINING PROGRAM

## 2025-03-20 PROCEDURE — 36415 COLL VENOUS BLD VENIPUNCTURE: CPT | Performed by: STUDENT IN AN ORGANIZED HEALTH CARE EDUCATION/TRAINING PROGRAM

## 2025-03-20 PROCEDURE — 85027 COMPLETE CBC AUTOMATED: CPT | Performed by: STUDENT IN AN ORGANIZED HEALTH CARE EDUCATION/TRAINING PROGRAM

## 2025-03-20 PROCEDURE — G2211 COMPLEX E/M VISIT ADD ON: HCPCS | Performed by: STUDENT IN AN ORGANIZED HEALTH CARE EDUCATION/TRAINING PROGRAM

## 2025-03-20 PROCEDURE — 84439 ASSAY OF FREE THYROXINE: CPT | Mod: 90 | Performed by: STUDENT IN AN ORGANIZED HEALTH CARE EDUCATION/TRAINING PROGRAM

## 2025-03-20 PROCEDURE — 84443 ASSAY THYROID STIM HORMONE: CPT | Mod: 90 | Performed by: STUDENT IN AN ORGANIZED HEALTH CARE EDUCATION/TRAINING PROGRAM

## 2025-03-20 PROCEDURE — 99214 OFFICE O/P EST MOD 30 MIN: CPT | Performed by: STUDENT IN AN ORGANIZED HEALTH CARE EDUCATION/TRAINING PROGRAM

## 2025-03-20 RX ORDER — FLUOXETINE 10 MG/1
30 CAPSULE ORAL DAILY
Qty: 270 CAPSULE | Refills: 0 | Status: SHIPPED | OUTPATIENT
Start: 2025-03-20

## 2025-03-20 NOTE — PROGRESS NOTES
Assessment & Plan     1. JERRY (generalized anxiety disorder) (Primary)  R/b increasing prozac reviewed, plan below  - FLUoxetine (PROZAC) 10 MG capsule; Take 3 capsules (30 mg) by mouth daily.  Dispense: 270 capsule; Refill: 0    2. Mild intermittent asthma without complication  Stable, continue current medication     3. Thyroid dysfunction  4. Other fatigue  5. Low ferritin  Recheck labs today  - FERRITIN ([facility])  - Hemogram Platelet ([facility])   - TSH ([facility])  - T4 FREE ([facility])    Patient Instructions   Blood work today    Increase fluoxetine to 30mg daily    Follow-up with me if not established with new psychiatrist within 6-8 weeks, sooner if concerns     Continue current inhalers     Reasons to follow-up sooner or seek emergent care reviewed.     Rickey Renee MD, [facility]      Subjective     Alangladisrowena Zapata is a 24 year old female who presents to clinic today for the following health issues:    HPI   Nursing Notes:   Ambar Madera MA  3/20/2025  2:26 PM  Signed  Chief Complaint   Patient presents with    Recheck Medication     Med check for anxiety - had someone with [facility] but she is gone  Wants to get thyroid levels checked      Pre-visit Screening:  Immunizations:  up to date  Colonoscopy:  na  Mammogram: na  Asthma Action Test/Plan:  na  PHQ9:  given  GAD7:  given   Questioned patient about current smoking habits Pt. has never smoked.  Ok to leave detailed message on voice mail for today's visit only yes, phone # [phone] (home)         Difficult winter with multiple URIs and asthma control concerns. Having some fatigue and feeling foggy. Has been disciplined with diet and exercise but weight hasn't changed. Concerned about thyroid. Has had Rheumatology eval last year.      Anxiety   How are you doing with your anxiety since your last visit? Doing well on prozac but psychiatrist no longer available  Are you feeling depressed? No  Do you have any concerns with  your use of alcohol or other drugs? No    Social History     Tobacco Use    Smoking status: Never     Passive exposure: Never    Smokeless tobacco: Never   Substance Use Topics    Alcohol use: Yes     Alcohol/week: 3.0 standard drinks of alcohol     Types: 3 Standard drinks or equivalent per week     Comment: weekends    Drug use: Never         11/6/2023     3:40 PM 10/14/2024     4:12 PM 3/24/2025     2:08 PM   JERRY-7 SCORE   Total Score 10 4 5         11/6/2023     3:40 PM 10/14/2024     4:12 PM 3/24/2025     2:08 PM   PHQ   PHQ-9 Total Score 7 2 3   Q9: Thoughts of better off dead/self-harm past 2 weeks Not at all Not at all Not at all       Asthma        3/24/2025     2:08 PM   ACT Total Scores   ACT TOTAL SCORE (Goal Greater than or Equal to 20) 21   In the past 12 months, how many times did you visit the emergency room for your asthma without being admitted to the hospital? 0   In the past 12 months, how many times were you hospitalized overnight because of your asthma? 0     Do you have any of the following symptoms? None of these symptoms (cough/noisy breathing/trouble with breathing)  Do you want more information about how to use your inhaler? No        Objective    /70 (BP Location: Left arm, Patient Position: Sitting, Cuff Size: Adult Large)   Pulse 80   Temp 97.4  F (36.3  C) (Temporal)   Wt 78.8 kg (173 lb 12.8 oz)   LMP 03/08/2025 (Exact Date)   SpO2 99%   BMI 30.79 kg/m    Body mass index is 30.79 kg/m .  Alert, NAD  NC/AT  Sclerae anicteric  RRR  Resp nonlabored CTAB  Skin warm and dry  No focal neuro deficits. Speech intact. Normal gait.  Appropriate affect       Labs reviewed.

## 2025-03-20 NOTE — PATIENT INSTRUCTIONS
Blood work today    Increase fluoxetine to 30mg daily    Follow-up with me if not established with new psychiatrist within 6-8 weeks, sooner if concerns     Continue current inhalers

## 2025-03-20 NOTE — NURSING NOTE
Chief Complaint   Patient presents with    Recheck Medication     Med check for anxiety - had someone with [facility] but she is gone  Wants to get thyroid levels checked      Pre-visit Screening:  Immunizations:  up to date  Colonoscopy:  na  Mammogram: na  Asthma Action Test/Plan:  na  PHQ9:  given  GAD7:  given   Questioned patient about current smoking habits Pt. has never smoked.  Ok to leave detailed message on voice mail for today's visit only yes, phone # [phone] (home)

## 2025-03-21 LAB
FERRITIN SERPL-MCNC: 11 NG/ML (ref 16–154)
T4, FREE, NON-DIALYSIS - QUEST: 1.1 NG/DL (ref 0.8–1.8)
TSH SERPL-ACNC: 6.79 MIU/L

## 2025-03-24 ASSESSMENT — ANXIETY QUESTIONNAIRES
GAD7 TOTAL SCORE: 5
7. FEELING AFRAID AS IF SOMETHING AWFUL MIGHT HAPPEN: SEVERAL DAYS
3. WORRYING TOO MUCH ABOUT DIFFERENT THINGS: NOT AT ALL
5. BEING SO RESTLESS THAT IT IS HARD TO SIT STILL: NOT AT ALL
IF YOU CHECKED OFF ANY PROBLEMS ON THIS QUESTIONNAIRE, HOW DIFFICULT HAVE THESE PROBLEMS MADE IT FOR YOU TO DO YOUR WORK, TAKE CARE OF THINGS AT HOME, OR GET ALONG WITH OTHER PEOPLE: VERY DIFFICULT
1. FEELING NERVOUS, ANXIOUS, OR ON EDGE: SEVERAL DAYS
GAD7 TOTAL SCORE: 5
6. BECOMING EASILY ANNOYED OR IRRITABLE: MORE THAN HALF THE DAYS
2. NOT BEING ABLE TO STOP OR CONTROL WORRYING: SEVERAL DAYS

## 2025-03-24 ASSESSMENT — PATIENT HEALTH QUESTIONNAIRE - PHQ9
SUM OF ALL RESPONSES TO PHQ QUESTIONS 1-9: 3
5. POOR APPETITE OR OVEREATING: NOT AT ALL

## 2025-03-24 ASSESSMENT — ASTHMA QUESTIONNAIRES: ACT_TOTALSCORE: 21

## 2025-04-01 ENCOUNTER — TRANSFERRED RECORDS (OUTPATIENT)
Dept: FAMILY MEDICINE | Facility: CLINIC | Age: 25
End: 2025-04-01

## 2025-05-19 ENCOUNTER — APPOINTMENT (OUTPATIENT)
Dept: CT IMAGING | Facility: CLINIC | Age: 25
DRG: 357 | End: 2025-05-19
Attending: EMERGENCY MEDICINE
Payer: COMMERCIAL

## 2025-05-19 ENCOUNTER — TRANSFERRED RECORDS (OUTPATIENT)
Dept: FAMILY MEDICINE | Facility: CLINIC | Age: 25
End: 2025-05-19

## 2025-05-19 ENCOUNTER — OFFICE VISIT (OUTPATIENT)
Dept: URGENT CARE | Facility: URGENT CARE | Age: 25
End: 2025-05-19
Payer: COMMERCIAL

## 2025-05-19 ENCOUNTER — HOSPITAL ENCOUNTER (INPATIENT)
Facility: CLINIC | Age: 25
DRG: 357 | End: 2025-05-19
Attending: EMERGENCY MEDICINE | Admitting: SURGERY
Payer: COMMERCIAL

## 2025-05-19 ENCOUNTER — ANCILLARY PROCEDURE (OUTPATIENT)
Dept: GENERAL RADIOLOGY | Facility: CLINIC | Age: 25
End: 2025-05-19
Attending: INTERNAL MEDICINE
Payer: COMMERCIAL

## 2025-05-19 VITALS
HEART RATE: 97 BPM | HEIGHT: 63 IN | RESPIRATION RATE: 18 BRPM | TEMPERATURE: 99.7 F | WEIGHT: 173 LBS | BODY MASS INDEX: 30.65 KG/M2 | OXYGEN SATURATION: 99 % | SYSTOLIC BLOOD PRESSURE: 135 MMHG | DIASTOLIC BLOOD PRESSURE: 76 MMHG

## 2025-05-19 DIAGNOSIS — R10.13 ABDOMINAL PAIN, EPIGASTRIC: ICD-10-CM

## 2025-05-19 DIAGNOSIS — K90.0 CELIAC DISEASE: Primary | ICD-10-CM

## 2025-05-19 DIAGNOSIS — J45.31 MILD PERSISTENT ASTHMA WITH EXACERBATION: ICD-10-CM

## 2025-05-19 DIAGNOSIS — D72.828 OTHER ELEVATED WHITE BLOOD CELL (WBC) COUNT: ICD-10-CM

## 2025-05-19 DIAGNOSIS — K90.0 CELIAC DISEASE: ICD-10-CM

## 2025-05-19 DIAGNOSIS — K35.30 ACUTE APPENDICITIS WITH LOCALIZED PERITONITIS, WITHOUT PERFORATION, ABSCESS, OR GANGRENE: ICD-10-CM

## 2025-05-19 DIAGNOSIS — R10.84 ABDOMINAL PAIN, GENERALIZED: Primary | ICD-10-CM

## 2025-05-19 LAB
ALBUMIN SERPL BCG-MCNC: 3.9 G/DL (ref 3.5–5.2)
ALBUMIN UR-MCNC: NEGATIVE MG/DL
ALP SERPL-CCNC: 79 U/L (ref 40–150)
ALT SERPL W P-5'-P-CCNC: 13 U/L (ref 0–50)
ANION GAP SERPL CALCULATED.3IONS-SCNC: 13 MMOL/L (ref 7–15)
APPEARANCE UR: CLEAR
AST SERPL W P-5'-P-CCNC: 25 U/L (ref 0–45)
BACTERIA #/AREA URNS HPF: ABNORMAL /HPF
BASOPHILS # BLD AUTO: 0 10E3/UL (ref 0–0.2)
BASOPHILS NFR BLD AUTO: 0 %
BILIRUB SERPL-MCNC: 0.2 MG/DL
BILIRUB UR QL STRIP: NEGATIVE
BUN SERPL-MCNC: 11.3 MG/DL (ref 6–20)
CALCIUM SERPL-MCNC: 9.1 MG/DL (ref 8.8–10.4)
CHLORIDE SERPL-SCNC: 101 MMOL/L (ref 98–107)
COLOR UR AUTO: YELLOW
CREAT SERPL-MCNC: 0.87 MG/DL (ref 0.51–0.95)
EGFRCR SERPLBLD CKD-EPI 2021: >90 ML/MIN/1.73M2
EOSINOPHIL # BLD AUTO: 0.6 10E3/UL (ref 0–0.7)
EOSINOPHIL NFR BLD AUTO: 3 %
ERYTHROCYTE [DISTWIDTH] IN BLOOD BY AUTOMATED COUNT: 12.5 % (ref 10–15)
GLUCOSE SERPL-MCNC: 110 MG/DL (ref 70–99)
GLUCOSE UR STRIP-MCNC: NEGATIVE MG/DL
HCG SERPL QL: NEGATIVE
HCO3 SERPL-SCNC: 24 MMOL/L (ref 22–29)
HCT VFR BLD AUTO: 35.8 % (ref 35–47)
HGB BLD-MCNC: 11.4 G/DL (ref 11.7–15.7)
HGB UR QL STRIP: ABNORMAL
HOLD SPECIMEN: NORMAL
HOLD SPECIMEN: NORMAL
IMM GRANULOCYTES # BLD: 0 10E3/UL
IMM GRANULOCYTES NFR BLD: 0 %
KETONES UR STRIP-MCNC: NEGATIVE MG/DL
LEUKOCYTE ESTERASE UR QL STRIP: ABNORMAL
LIPASE SERPL-CCNC: 13 U/L (ref 13–60)
LYMPHOCYTES # BLD AUTO: 2.2 10E3/UL (ref 0.8–5.3)
LYMPHOCYTES NFR BLD AUTO: 12 %
MCH RBC QN AUTO: 27.5 PG (ref 26.5–33)
MCHC RBC AUTO-ENTMCNC: 31.8 G/DL (ref 31.5–36.5)
MCV RBC AUTO: 86 FL (ref 78–100)
MONOCYTES # BLD AUTO: 1.4 10E3/UL (ref 0–1.3)
MONOCYTES NFR BLD AUTO: 8 %
NEUTROPHILS # BLD AUTO: 13.5 10E3/UL (ref 1.6–8.3)
NEUTROPHILS NFR BLD AUTO: 77 %
NITRATE UR QL: NEGATIVE
PH UR STRIP: 6 [PH] (ref 5–7)
PLATELET # BLD AUTO: 362 10E3/UL (ref 150–450)
POTASSIUM SERPL-SCNC: 4.5 MMOL/L (ref 3.4–5.3)
PROT SERPL-MCNC: 7.5 G/DL (ref 6.4–8.3)
RBC # BLD AUTO: 4.15 10E6/UL (ref 3.8–5.2)
RBC #/AREA URNS AUTO: ABNORMAL /HPF
SODIUM SERPL-SCNC: 138 MMOL/L (ref 135–145)
SP GR UR STRIP: 1.01 (ref 1–1.03)
SQUAMOUS #/AREA URNS AUTO: ABNORMAL /LPF
UROBILINOGEN UR STRIP-ACNC: 0.2 E.U./DL
WBC # BLD AUTO: 17.7 10E3/UL (ref 4–11)
WBC #/AREA URNS AUTO: ABNORMAL /HPF

## 2025-05-19 PROCEDURE — 36415 COLL VENOUS BLD VENIPUNCTURE: CPT | Performed by: INTERNAL MEDICINE

## 2025-05-19 PROCEDURE — 96374 THER/PROPH/DIAG INJ IV PUSH: CPT

## 2025-05-19 PROCEDURE — 250N000011 HC RX IP 250 OP 636: Performed by: EMERGENCY MEDICINE

## 2025-05-19 PROCEDURE — 74177 CT ABD & PELVIS W/CONTRAST: CPT

## 2025-05-19 PROCEDURE — 36415 COLL VENOUS BLD VENIPUNCTURE: CPT | Performed by: EMERGENCY MEDICINE

## 2025-05-19 PROCEDURE — 80053 COMPREHEN METABOLIC PANEL: CPT | Performed by: EMERGENCY MEDICINE

## 2025-05-19 PROCEDURE — 83690 ASSAY OF LIPASE: CPT | Performed by: EMERGENCY MEDICINE

## 2025-05-19 PROCEDURE — 74019 RADEX ABDOMEN 2 VIEWS: CPT | Mod: TC | Performed by: INTERNAL MEDICINE

## 2025-05-19 PROCEDURE — 96375 TX/PRO/DX INJ NEW DRUG ADDON: CPT

## 2025-05-19 PROCEDURE — 250N000009 HC RX 250: Performed by: EMERGENCY MEDICINE

## 2025-05-19 PROCEDURE — 84703 CHORIONIC GONADOTROPIN ASSAY: CPT | Performed by: EMERGENCY MEDICINE

## 2025-05-19 PROCEDURE — 250N000011 HC RX IP 250 OP 636: Mod: JZ | Performed by: EMERGENCY MEDICINE

## 2025-05-19 PROCEDURE — 99215 OFFICE O/P EST HI 40 MIN: CPT | Performed by: INTERNAL MEDICINE

## 2025-05-19 PROCEDURE — 81001 URINALYSIS AUTO W/SCOPE: CPT | Performed by: INTERNAL MEDICINE

## 2025-05-19 PROCEDURE — 99285 EMERGENCY DEPT VISIT HI MDM: CPT | Mod: 25

## 2025-05-19 PROCEDURE — 85025 COMPLETE CBC W/AUTO DIFF WBC: CPT | Performed by: INTERNAL MEDICINE

## 2025-05-19 RX ORDER — HYDROMORPHONE HYDROCHLORIDE 1 MG/ML
0.5 INJECTION, SOLUTION INTRAMUSCULAR; INTRAVENOUS; SUBCUTANEOUS ONCE
Refills: 0 | Status: COMPLETED | OUTPATIENT
Start: 2025-05-19 | End: 2025-05-19

## 2025-05-19 RX ORDER — KETOROLAC TROMETHAMINE 15 MG/ML
15 INJECTION, SOLUTION INTRAMUSCULAR; INTRAVENOUS ONCE
Status: COMPLETED | OUTPATIENT
Start: 2025-05-19 | End: 2025-05-19

## 2025-05-19 RX ORDER — IOPAMIDOL 755 MG/ML
500 INJECTION, SOLUTION INTRAVASCULAR ONCE
Status: COMPLETED | OUTPATIENT
Start: 2025-05-19 | End: 2025-05-19

## 2025-05-19 RX ADMIN — SODIUM CHLORIDE 62 ML: 9 INJECTION, SOLUTION INTRAVENOUS at 23:24

## 2025-05-19 RX ADMIN — KETOROLAC TROMETHAMINE 15 MG: 15 INJECTION, SOLUTION INTRAMUSCULAR; INTRAVENOUS at 22:30

## 2025-05-19 RX ADMIN — HYDROMORPHONE HYDROCHLORIDE 0.5 MG: 1 INJECTION, SOLUTION INTRAMUSCULAR; INTRAVENOUS; SUBCUTANEOUS at 23:35

## 2025-05-19 RX ADMIN — IOPAMIDOL 86 ML: 755 INJECTION, SOLUTION INTRAVENOUS at 23:23

## 2025-05-19 ASSESSMENT — COLUMBIA-SUICIDE SEVERITY RATING SCALE - C-SSRS
6. HAVE YOU EVER DONE ANYTHING, STARTED TO DO ANYTHING, OR PREPARED TO DO ANYTHING TO END YOUR LIFE?: NO
2. HAVE YOU ACTUALLY HAD ANY THOUGHTS OF KILLING YOURSELF IN THE PAST MONTH?: NO
1. IN THE PAST MONTH, HAVE YOU WISHED YOU WERE DEAD OR WISHED YOU COULD GO TO SLEEP AND NOT WAKE UP?: NO

## 2025-05-19 ASSESSMENT — ENCOUNTER SYMPTOMS
ACTIVITY CHANGE: 1
COUGH: 0
APPETITE CHANGE: 1
SORE THROAT: 0
FATIGUE: 1
HEADACHES: 1
DYSURIA: 0
CONSTIPATION: 1
VOMITING: 0
ROS GI COMMENTS: BLOATED
RHINORRHEA: 0

## 2025-05-19 ASSESSMENT — ACTIVITIES OF DAILY LIVING (ADL): ADLS_ACUITY_SCORE: 41

## 2025-05-19 NOTE — LETTER
[address], [address]  [place] [address]  [phone]  F: [phone]    [address], [address]  [place] [address]  [phone]  F: [phone]    [email or web address]  [email or web address]  [email or web address]               May 23, 2025    RE: Deirdre Zapata  :  2000      This is to certify that Alangladisn E Benny has been under my care for surgery.      Patient is able to return to work as of 25.  Comments: 20 pound lift limit until 6/3/25.      Sincerely,            Merlyn Carrasquillo PA-C

## 2025-05-19 NOTE — PROGRESS NOTES
Urgent Care Clinic Visit    Chief Complaint   Patient presents with    Urgent Care     C/o abdominal pain, fatigue, constipation and diarrhea x3 days               5/19/2025     5:45 PM   Additional Questions   Roomed by Irma         5/19/2025     5:45 PM   Patient Reported Additional Medications   Patient reports taking the following new medications Levothyroxine 25mcg daily

## 2025-05-19 NOTE — PROGRESS NOTES
ASSESSMENT AND PLAN:      ICD-10-CM    1. Abdominal pain, generalized  R10.84       2. Celiac disease  K90.0       3. Other elevated white blood cell (WBC) count  D72.828       Discussed with patient  With recent anal fissure, she is at risk for constipation due to holding from painful bowel movement    For anal fissure recommend the lidocaine ointment given by GI.  Might need to do sitz bath and ice to area.  Goal is to have easy BMs with recent     with constipation may need        Patient Instructions       Blood counts - white count is elevated at 17.7.-   This is usually a sign of underlying inflammation -   due to level of elevation, you are being referred into the emergency room     you may need imaging such as CT of the abdomen -look for things such as appendicitis and so forth      Urinalysis. - Unremarkable except blood noted which is expected when you are on your menstrual cycle.        Abdominal x-ray -stool throughout colon with large collection of gas on left side   No obstruction                    Beronica Fritz MD  [facility]    Subjective     Deirdre Zapata is a 24 year old who presents for Patient presents with:  Urgent Care: C/o abdominal pain, fatigue, constipation and diarrhea x3 days    [facility] physician patient    Abdominal Pain  Patient's had symptoms for the past 3 days alternating between constipation and diarrhea  Past medical history notable for celiac disease and polycystic ovaries    Seen by GI on this last Friday 5 days ago -treatment cream including lidocaine  Has severe anal fissure-     Called Gastroenterology today  and recommended ER or UC  To check for obstruction  No bowel movement for 5 days - since her GI appointment   Location: generalized     Pain character: Bloating    Feels pressure on her bladder   started menstrual cycle yesterday  affected sleep    Restarted MiraLAX half a capful daily has been taking Dulcolax by mouth and  "drinking water.  Gluten-free trying to eat high-fiber.  Associated Symptoms: bloating.  Female : History of PCO.  Disturbed menses  Surgical History: none      Review of Systems   Constitutional:  Positive for activity change, appetite change and fatigue.   HENT:  Negative for rhinorrhea (last BM was 5 days ago & was loose.  Dx anal fisssre) and sore throat.    Respiratory:  Negative for cough.    Gastrointestinal:  Positive for constipation. Negative for vomiting.        Bloated   Genitourinary:  Negative for dysuria.        On menses   Neurological:  Positive for headaches.         Patient Active Problem List   Diagnosis    Iron deficiency anemia due to chronic blood loss    Allergic rhinitis due to animal hair and dander    Celiac disease    Mild intermittent asthma without complication    Irregular menses    Thyroid dysfunction    Vitamin D deficiency    ACP (advance care planning)    PCOS (polycystic ovarian syndrome)           Objective    /76 (BP Location: Right arm, Patient Position: Sitting, Cuff Size: Adult Regular)   Pulse 97   Temp 99.7  F (37.6  C) (Tympanic)   Resp 18   Ht 1.6 m (5' 3\")   Wt 78.5 kg (173 lb)   LMP 05/18/2025 (Exact Date)   SpO2 99%   BMI 30.65 kg/m    Physical Exam  Vitals reviewed.   Constitutional:       Appearance: Normal appearance. She is ill-appearing (fatigue).   HENT:      Mouth/Throat:      Mouth: Mucous membranes are moist.   Eyes:      General: No scleral icterus.  Cardiovascular:      Rate and Rhythm: Normal rate and regular rhythm.      Pulses: Normal pulses.      Heart sounds: Normal heart sounds.   Pulmonary:      Effort: Pulmonary effort is normal.      Breath sounds: Normal breath sounds.   Abdominal:      Palpations: Abdomen is soft.      Tenderness: There is abdominal tenderness (Mild throughout). There is no right CVA tenderness, left CVA tenderness, guarding or rebound.      Comments: Hypoactive bowel sounds   Neurological:      Mental Status: She " is alert.         Xray - Reviewed and interpreted by me.  Stool throughout.  No air-fluid levels to suggest bowel obstruction    Results for orders placed or performed in visit on 05/19/25 (from the past 24 hours)   UA Macroscopic with reflex to Microscopic and Culture - Lab Collect    Specimen: Urine, Clean Catch   Result Value Ref Range    Color Urine Yellow Colorless, Straw, Light Yellow, Yellow    Appearance Urine Clear Clear    Glucose Urine Negative Negative mg/dL    Bilirubin Urine Negative Negative    Ketones Urine Negative Negative mg/dL    Specific Gravity Urine 1.015 1.003 - 1.035    Blood Urine Large (A) Negative    pH Urine 6.0 5.0 - 7.0    Protein Albumin Urine Negative Negative mg/dL    Urobilinogen Urine 0.2 0.2, 1.0 E.U./dL    Nitrite Urine Negative Negative    Leukocyte Esterase Urine Trace (A) Negative   CBC with platelets and differential    Narrative    The following orders were created for panel order CBC with platelets and differential.  Procedure                               Abnormality         Status                     ---------                               -----------         ------                     CBC with platelets and ...[[id number]]  Abnormal            Final result                 Please view results for these tests on the individual orders.   CBC with platelets and differential   Result Value Ref Range    WBC Count 17.7 (H) 4.0 - 11.0 10e3/uL    RBC Count 4.15 3.80 - 5.20 10e6/uL    Hemoglobin 11.4 (L) 11.7 - 15.7 g/dL    Hematocrit 35.8 35.0 - 47.0 %    MCV 86 78 - 100 fL    MCH 27.5 26.5 - 33.0 pg    MCHC 31.8 31.5 - 36.5 g/dL    RDW 12.5 10.0 - 15.0 %    Platelet Count 362 150 - 450 10e3/uL    % Neutrophils 77 %    % Lymphocytes 12 %    % Monocytes 8 %    % Eosinophils 3 %    % Basophils 0 %    % Immature Granulocytes 0 %    Absolute Neutrophils 13.5 (H) 1.6 - 8.3 10e3/uL    Absolute Lymphocytes 2.2 0.8 - 5.3 10e3/uL    Absolute Monocytes 1.4 (H) 0.0 - 1.3 10e3/uL     Absolute Eosinophils 0.6 0.0 - 0.7 10e3/uL    Absolute Basophils 0.0 0.0 - 0.2 10e3/uL    Absolute Immature Granulocytes 0.0 <=0.4 10e3/uL   UA Microscopic with Reflex to Culture   Result Value Ref Range    Bacteria Urine Few (A) None Seen /HPF    RBC Urine 10-25 (A) 0-2 /HPF /HPF    WBC Urine 0-5 0-5 /HPF /HPF    Squamous Epithelials Urine Few (A) None Seen /LPF    Narrative    Urine Culture not indicated

## 2025-05-19 NOTE — PATIENT INSTRUCTIONS
Blood counts - white count is elevated at 17.7.-   This is usually a sign of underlying inflammation -   due to level of elevation, you are being referred into the emergency room     you may need imaging such as CT of the abdomen -look for things such as appendicitis and so forth      Urinalysis. - Unremarkable except blood noted which is expected when you are on your menstrual cycle.        Abdominal x-ray -stool throughout colon with large collection of gas on left side   No obstruction      Component      Latest Ref [facility] 5/19/2025  6:16 PM   WBC      4.0 - 11.0 10e3/uL 17.7 (H)    RBC Count      3.80 - 5.20 10e6/uL 4.15    Hemoglobin      11.7 - 15.7 g/dL 11.4 (L)    Hematocrit      35.0 - 47.0 % 35.8    MCV      78 - 100 fL 86    MCH      26.5 - 33.0 pg 27.5    MCHC      31.5 - 36.5 g/dL 31.8    RDW      10.0 - 15.0 % 12.5    Platelet Count      150 - 450 10e3/uL 362    % Neutrophils      % 77    % Lymphocytes      % 12    % Monocytes      % 8    % Eosinophils      % 3    % Basophils      % 0    % Immature Granulocytes      % 0    Absolute Neutrophil      1.6 - 8.3 10e3/uL 13.5 (H)    Absolute Lymphocytes      0.8 - 5.3 10e3/uL 2.2    Absolute Monocytes      0.0 - 1.3 10e3/uL 1.4 (H)    Absolute Eosinophils      0.0 - 0.7 10e3/uL 0.6    Absolute Basophils      0.0 - 0.2 10e3/uL 0.0    Absolute Immature Granulocytes      <=0.4 10e3/uL 0.0       Legend:  (H) High  (L) Low

## 2025-05-20 ENCOUNTER — ANESTHESIA (OUTPATIENT)
Dept: SURGERY | Facility: CLINIC | Age: 25
End: 2025-05-20
Payer: COMMERCIAL

## 2025-05-20 ENCOUNTER — ANESTHESIA EVENT (OUTPATIENT)
Dept: SURGERY | Facility: CLINIC | Age: 25
End: 2025-05-20
Payer: COMMERCIAL

## 2025-05-20 PROBLEM — K35.30 ACUTE APPENDICITIS WITH LOCALIZED PERITONITIS, WITHOUT PERFORATION, ABSCESS, OR GANGRENE: Status: ACTIVE | Noted: 2025-05-20

## 2025-05-20 PROCEDURE — 250N000011 HC RX IP 250 OP 636: Mod: JZ | Performed by: SURGERY

## 2025-05-20 PROCEDURE — 250N000011 HC RX IP 250 OP 636: Performed by: EMERGENCY MEDICINE

## 2025-05-20 PROCEDURE — 258N000003 HC RX IP 258 OP 636: Performed by: SURGERY

## 2025-05-20 PROCEDURE — 250N000013 HC RX MED GY IP 250 OP 250 PS 637: Performed by: STUDENT IN AN ORGANIZED HEALTH CARE EDUCATION/TRAINING PROGRAM

## 2025-05-20 PROCEDURE — 250N000025 HC SEVOFLURANE, PER MIN: Performed by: SURGERY

## 2025-05-20 PROCEDURE — 710N000009 HC RECOVERY PHASE 1, LEVEL 1, PER MIN: Performed by: SURGERY

## 2025-05-20 PROCEDURE — 258N000003 HC RX IP 258 OP 636: Performed by: STUDENT IN AN ORGANIZED HEALTH CARE EDUCATION/TRAINING PROGRAM

## 2025-05-20 PROCEDURE — 272N000001 HC OR GENERAL SUPPLY STERILE: Performed by: SURGERY

## 2025-05-20 PROCEDURE — 120N000001 HC R&B MED SURG/OB

## 2025-05-20 PROCEDURE — 250N000009 HC RX 250: Performed by: NURSE ANESTHETIST, CERTIFIED REGISTERED

## 2025-05-20 PROCEDURE — 0W9G40Z DRAINAGE OF PERITONEAL CAVITY WITH DRAINAGE DEVICE, PERCUTANEOUS ENDOSCOPIC APPROACH: ICD-10-PCS | Performed by: SURGERY

## 2025-05-20 PROCEDURE — 49320 DIAG LAPARO SEPARATE PROC: CPT | Mod: AS | Performed by: PHYSICIAN ASSISTANT

## 2025-05-20 PROCEDURE — 250N000011 HC RX IP 250 OP 636: Performed by: STUDENT IN AN ORGANIZED HEALTH CARE EDUCATION/TRAINING PROGRAM

## 2025-05-20 PROCEDURE — 370N000017 HC ANESTHESIA TECHNICAL FEE, PER MIN: Performed by: SURGERY

## 2025-05-20 PROCEDURE — 258N000001 HC RX 258: Performed by: SURGERY

## 2025-05-20 PROCEDURE — 250N000013 HC RX MED GY IP 250 OP 250 PS 637: Performed by: SURGERY

## 2025-05-20 PROCEDURE — 999N000141 HC STATISTIC PRE-PROCEDURE NURSING ASSESSMENT: Performed by: SURGERY

## 2025-05-20 PROCEDURE — 99222 1ST HOSP IP/OBS MODERATE 55: CPT | Mod: 57 | Performed by: SURGERY

## 2025-05-20 PROCEDURE — 49320 DIAG LAPARO SEPARATE PROC: CPT | Performed by: SURGERY

## 2025-05-20 PROCEDURE — 360N000076 HC SURGERY LEVEL 3, PER MIN: Performed by: SURGERY

## 2025-05-20 PROCEDURE — 250N000011 HC RX IP 250 OP 636: Mod: JZ | Performed by: ANESTHESIOLOGY

## 2025-05-20 PROCEDURE — 250N000011 HC RX IP 250 OP 636: Performed by: SURGERY

## 2025-05-20 PROCEDURE — 250N000011 HC RX IP 250 OP 636: Performed by: NURSE ANESTHETIST, CERTIFIED REGISTERED

## 2025-05-20 PROCEDURE — 250N000009 HC RX 250: Performed by: ANESTHESIOLOGY

## 2025-05-20 RX ORDER — NALOXONE HYDROCHLORIDE 0.4 MG/ML
0.1 INJECTION, SOLUTION INTRAMUSCULAR; INTRAVENOUS; SUBCUTANEOUS
Status: DISCONTINUED | OUTPATIENT
Start: 2025-05-20 | End: 2025-05-20 | Stop reason: HOSPADM

## 2025-05-20 RX ORDER — OXYCODONE HYDROCHLORIDE 5 MG/1
5 TABLET ORAL EVERY 4 HOURS PRN
Status: DISCONTINUED | OUTPATIENT
Start: 2025-05-20 | End: 2025-05-23 | Stop reason: HOSPADM

## 2025-05-20 RX ORDER — ONDANSETRON 4 MG/1
4 TABLET, ORALLY DISINTEGRATING ORAL EVERY 30 MIN PRN
Status: DISCONTINUED | OUTPATIENT
Start: 2025-05-20 | End: 2025-05-20 | Stop reason: HOSPADM

## 2025-05-20 RX ORDER — ACETAMINOPHEN 325 MG/1
650 TABLET ORAL EVERY 4 HOURS PRN
Status: DISCONTINUED | OUTPATIENT
Start: 2025-05-20 | End: 2025-05-23 | Stop reason: HOSPADM

## 2025-05-20 RX ORDER — LIDOCAINE HYDROCHLORIDE 20 MG/ML
INJECTION, SOLUTION INFILTRATION; PERINEURAL PRN
Status: DISCONTINUED | OUTPATIENT
Start: 2025-05-20 | End: 2025-05-20

## 2025-05-20 RX ORDER — NALOXONE HYDROCHLORIDE 0.4 MG/ML
0.4 INJECTION, SOLUTION INTRAMUSCULAR; INTRAVENOUS; SUBCUTANEOUS
Status: DISCONTINUED | OUTPATIENT
Start: 2025-05-20 | End: 2025-05-23 | Stop reason: HOSPADM

## 2025-05-20 RX ORDER — LIDOCAINE 40 MG/G
CREAM TOPICAL
Status: DISCONTINUED | OUTPATIENT
Start: 2025-05-20 | End: 2025-05-20 | Stop reason: HOSPADM

## 2025-05-20 RX ORDER — SODIUM CHLORIDE, SODIUM LACTATE, POTASSIUM CHLORIDE, CALCIUM CHLORIDE 600; 310; 30; 20 MG/100ML; MG/100ML; MG/100ML; MG/100ML
INJECTION, SOLUTION INTRAVENOUS CONTINUOUS
Status: DISCONTINUED | OUTPATIENT
Start: 2025-05-20 | End: 2025-05-20 | Stop reason: HOSPADM

## 2025-05-20 RX ORDER — FENTANYL CITRATE 50 UG/ML
25 INJECTION, SOLUTION INTRAMUSCULAR; INTRAVENOUS EVERY 5 MIN PRN
Status: DISCONTINUED | OUTPATIENT
Start: 2025-05-20 | End: 2025-05-20 | Stop reason: HOSPADM

## 2025-05-20 RX ORDER — METOPROLOL TARTRATE 1 MG/ML
1-2 INJECTION, SOLUTION INTRAVENOUS EVERY 5 MIN PRN
Status: DISCONTINUED | OUTPATIENT
Start: 2025-05-20 | End: 2025-05-20 | Stop reason: HOSPADM

## 2025-05-20 RX ORDER — ACETAMINOPHEN 650 MG/1
650 SUPPOSITORY RECTAL EVERY 4 HOURS PRN
Status: DISCONTINUED | OUTPATIENT
Start: 2025-05-20 | End: 2025-05-23 | Stop reason: HOSPADM

## 2025-05-20 RX ORDER — ONDANSETRON 2 MG/ML
4 INJECTION INTRAMUSCULAR; INTRAVENOUS EVERY 6 HOURS PRN
Status: DISCONTINUED | OUTPATIENT
Start: 2025-05-20 | End: 2025-05-23 | Stop reason: HOSPADM

## 2025-05-20 RX ORDER — DEXAMETHASONE SODIUM PHOSPHATE 4 MG/ML
INJECTION, SOLUTION INTRA-ARTICULAR; INTRALESIONAL; INTRAMUSCULAR; INTRAVENOUS; SOFT TISSUE PRN
Status: DISCONTINUED | OUTPATIENT
Start: 2025-05-20 | End: 2025-05-20

## 2025-05-20 RX ORDER — PIPERACILLIN SODIUM, TAZOBACTAM SODIUM 3; .375 G/15ML; G/15ML
3.38 INJECTION, POWDER, LYOPHILIZED, FOR SOLUTION INTRAVENOUS EVERY 6 HOURS
Status: DISCONTINUED | OUTPATIENT
Start: 2025-05-20 | End: 2025-05-23 | Stop reason: HOSPADM

## 2025-05-20 RX ORDER — PROCHLORPERAZINE MALEATE 5 MG/1
10 TABLET ORAL EVERY 6 HOURS PRN
Status: DISCONTINUED | OUTPATIENT
Start: 2025-05-20 | End: 2025-05-23 | Stop reason: HOSPADM

## 2025-05-20 RX ORDER — AMOXICILLIN 250 MG
2 CAPSULE ORAL 2 TIMES DAILY PRN
Status: DISCONTINUED | OUTPATIENT
Start: 2025-05-20 | End: 2025-05-23 | Stop reason: HOSPADM

## 2025-05-20 RX ORDER — PIPERACILLIN SODIUM, TAZOBACTAM SODIUM 4; .5 G/20ML; G/20ML
4.5 INJECTION, POWDER, LYOPHILIZED, FOR SOLUTION INTRAVENOUS ONCE
Status: COMPLETED | OUTPATIENT
Start: 2025-05-20 | End: 2025-05-20

## 2025-05-20 RX ORDER — ONDANSETRON 2 MG/ML
4 INJECTION INTRAMUSCULAR; INTRAVENOUS EVERY 30 MIN PRN
Status: DISCONTINUED | OUTPATIENT
Start: 2025-05-20 | End: 2025-05-20 | Stop reason: HOSPADM

## 2025-05-20 RX ORDER — GLYCOPYRROLATE 0.2 MG/ML
INJECTION, SOLUTION INTRAMUSCULAR; INTRAVENOUS PRN
Status: DISCONTINUED | OUTPATIENT
Start: 2025-05-20 | End: 2025-05-20

## 2025-05-20 RX ORDER — HYDROMORPHONE HCL IN WATER/PF 6 MG/30 ML
0.2 PATIENT CONTROLLED ANALGESIA SYRINGE INTRAVENOUS
Status: DISCONTINUED | OUTPATIENT
Start: 2025-05-20 | End: 2025-05-23 | Stop reason: HOSPADM

## 2025-05-20 RX ORDER — NALOXONE HYDROCHLORIDE 0.4 MG/ML
0.2 INJECTION, SOLUTION INTRAMUSCULAR; INTRAVENOUS; SUBCUTANEOUS
Status: DISCONTINUED | OUTPATIENT
Start: 2025-05-20 | End: 2025-05-23 | Stop reason: HOSPADM

## 2025-05-20 RX ORDER — DEXAMETHASONE SODIUM PHOSPHATE 4 MG/ML
4 INJECTION, SOLUTION INTRA-ARTICULAR; INTRALESIONAL; INTRAMUSCULAR; INTRAVENOUS; SOFT TISSUE
Status: DISCONTINUED | OUTPATIENT
Start: 2025-05-20 | End: 2025-05-20 | Stop reason: HOSPADM

## 2025-05-20 RX ORDER — AMOXICILLIN 250 MG
1 CAPSULE ORAL 2 TIMES DAILY PRN
Status: DISCONTINUED | OUTPATIENT
Start: 2025-05-20 | End: 2025-05-23 | Stop reason: HOSPADM

## 2025-05-20 RX ORDER — HYDROMORPHONE HCL IN WATER/PF 6 MG/30 ML
0.2 PATIENT CONTROLLED ANALGESIA SYRINGE INTRAVENOUS EVERY 5 MIN PRN
Status: DISCONTINUED | OUTPATIENT
Start: 2025-05-20 | End: 2025-05-20 | Stop reason: HOSPADM

## 2025-05-20 RX ORDER — SODIUM CHLORIDE, SODIUM LACTATE, POTASSIUM CHLORIDE, CALCIUM CHLORIDE 600; 310; 30; 20 MG/100ML; MG/100ML; MG/100ML; MG/100ML
INJECTION, SOLUTION INTRAVENOUS CONTINUOUS
Status: DISCONTINUED | OUTPATIENT
Start: 2025-05-20 | End: 2025-05-23 | Stop reason: HOSPADM

## 2025-05-20 RX ORDER — LEVOTHYROXINE SODIUM 25 UG/1
25 TABLET ORAL
COMMUNITY

## 2025-05-20 RX ORDER — ONDANSETRON 2 MG/ML
INJECTION INTRAMUSCULAR; INTRAVENOUS PRN
Status: DISCONTINUED | OUTPATIENT
Start: 2025-05-20 | End: 2025-05-20

## 2025-05-20 RX ORDER — PROPOFOL 10 MG/ML
INJECTION, EMULSION INTRAVENOUS PRN
Status: DISCONTINUED | OUTPATIENT
Start: 2025-05-20 | End: 2025-05-20

## 2025-05-20 RX ORDER — FENTANYL CITRATE 50 UG/ML
50 INJECTION, SOLUTION INTRAMUSCULAR; INTRAVENOUS EVERY 5 MIN PRN
Status: DISCONTINUED | OUTPATIENT
Start: 2025-05-20 | End: 2025-05-20 | Stop reason: HOSPADM

## 2025-05-20 RX ORDER — KETOROLAC TROMETHAMINE 15 MG/ML
15 INJECTION, SOLUTION INTRAMUSCULAR; INTRAVENOUS
Status: DISCONTINUED | OUTPATIENT
Start: 2025-05-20 | End: 2025-05-20 | Stop reason: HOSPADM

## 2025-05-20 RX ORDER — ONDANSETRON 4 MG/1
4 TABLET, ORALLY DISINTEGRATING ORAL EVERY 6 HOURS PRN
Status: DISCONTINUED | OUTPATIENT
Start: 2025-05-20 | End: 2025-05-23 | Stop reason: HOSPADM

## 2025-05-20 RX ORDER — HYDROMORPHONE HCL IN WATER/PF 6 MG/30 ML
0.4 PATIENT CONTROLLED ANALGESIA SYRINGE INTRAVENOUS EVERY 5 MIN PRN
Status: DISCONTINUED | OUTPATIENT
Start: 2025-05-20 | End: 2025-05-20 | Stop reason: HOSPADM

## 2025-05-20 RX ORDER — CEFAZOLIN SODIUM/WATER 2 G/20 ML
2 SYRINGE (ML) INTRAVENOUS SEE ADMIN INSTRUCTIONS
Status: DISCONTINUED | OUTPATIENT
Start: 2025-05-20 | End: 2025-05-20 | Stop reason: HOSPADM

## 2025-05-20 RX ORDER — BUPIVACAINE HYDROCHLORIDE 5 MG/ML
INJECTION, SOLUTION EPIDURAL; INTRACAUDAL; PERINEURAL PRN
Status: DISCONTINUED | OUTPATIENT
Start: 2025-05-20 | End: 2025-05-20 | Stop reason: HOSPADM

## 2025-05-20 RX ORDER — CEFAZOLIN SODIUM/WATER 2 G/20 ML
2 SYRINGE (ML) INTRAVENOUS
Status: COMPLETED | OUTPATIENT
Start: 2025-05-20 | End: 2025-05-20

## 2025-05-20 RX ORDER — HYDRALAZINE HYDROCHLORIDE 20 MG/ML
2.5-5 INJECTION INTRAMUSCULAR; INTRAVENOUS EVERY 10 MIN PRN
Status: DISCONTINUED | OUTPATIENT
Start: 2025-05-20 | End: 2025-05-20 | Stop reason: HOSPADM

## 2025-05-20 RX ORDER — HYDROMORPHONE HCL IN WATER/PF 6 MG/30 ML
0.4 PATIENT CONTROLLED ANALGESIA SYRINGE INTRAVENOUS
Status: DISCONTINUED | OUTPATIENT
Start: 2025-05-20 | End: 2025-05-23 | Stop reason: HOSPADM

## 2025-05-20 RX ORDER — ACETAMINOPHEN 325 MG/1
975 TABLET ORAL ONCE
Status: DISCONTINUED | OUTPATIENT
Start: 2025-05-20 | End: 2025-05-20 | Stop reason: HOSPADM

## 2025-05-20 RX ORDER — LEVOTHYROXINE SODIUM 25 UG/1
25 TABLET ORAL DAILY
Status: ON HOLD | COMMUNITY
Start: 2025-04-03 | End: 2025-05-20

## 2025-05-20 RX ORDER — FENTANYL CITRATE 50 UG/ML
INJECTION, SOLUTION INTRAMUSCULAR; INTRAVENOUS PRN
Status: DISCONTINUED | OUTPATIENT
Start: 2025-05-20 | End: 2025-05-20

## 2025-05-20 RX ADMIN — SODIUM CHLORIDE, SODIUM LACTATE, POTASSIUM CHLORIDE, AND CALCIUM CHLORIDE: .6; .31; .03; .02 INJECTION, SOLUTION INTRAVENOUS at 14:07

## 2025-05-20 RX ADMIN — PIPERACILLIN AND TAZOBACTAM 3.38 G: 3; .375 INJECTION, POWDER, FOR SOLUTION INTRAVENOUS at 20:34

## 2025-05-20 RX ADMIN — HYDROMORPHONE HYDROCHLORIDE 0.2 MG: 0.2 INJECTION, SOLUTION INTRAMUSCULAR; INTRAVENOUS; SUBCUTANEOUS at 11:04

## 2025-05-20 RX ADMIN — PROPOFOL 200 MG: 10 INJECTION, EMULSION INTRAVENOUS at 08:42

## 2025-05-20 RX ADMIN — ACETAMINOPHEN 650 MG: 325 TABLET, FILM COATED ORAL at 06:16

## 2025-05-20 RX ADMIN — SODIUM CHLORIDE, SODIUM LACTATE, POTASSIUM CHLORIDE, AND CALCIUM CHLORIDE: .6; .31; .03; .02 INJECTION, SOLUTION INTRAVENOUS at 08:31

## 2025-05-20 RX ADMIN — PIPERACILLIN AND TAZOBACTAM 3.38 G: 3; .375 INJECTION, POWDER, FOR SOLUTION INTRAVENOUS at 14:43

## 2025-05-20 RX ADMIN — HYDROMORPHONE HYDROCHLORIDE 0.2 MG: 0.2 INJECTION, SOLUTION INTRAMUSCULAR; INTRAVENOUS; SUBCUTANEOUS at 10:39

## 2025-05-20 RX ADMIN — ONDANSETRON 4 MG: 2 INJECTION INTRAMUSCULAR; INTRAVENOUS at 08:45

## 2025-05-20 RX ADMIN — MIDAZOLAM 2 MG: 1 INJECTION INTRAMUSCULAR; INTRAVENOUS at 08:31

## 2025-05-20 RX ADMIN — Medication 2 G: at 08:31

## 2025-05-20 RX ADMIN — HYDROMORPHONE HYDROCHLORIDE 0.2 MG: 0.2 INJECTION, SOLUTION INTRAMUSCULAR; INTRAVENOUS; SUBCUTANEOUS at 15:38

## 2025-05-20 RX ADMIN — DEXAMETHASONE SODIUM PHOSPHATE 8 MG: 4 INJECTION, SOLUTION INTRA-ARTICULAR; INTRALESIONAL; INTRAMUSCULAR; INTRAVENOUS; SOFT TISSUE at 08:45

## 2025-05-20 RX ADMIN — OXYCODONE HYDROCHLORIDE 5 MG: 5 TABLET ORAL at 18:56

## 2025-05-20 RX ADMIN — HYDROMORPHONE HYDROCHLORIDE 0.2 MG: 0.2 INJECTION, SOLUTION INTRAMUSCULAR; INTRAVENOUS; SUBCUTANEOUS at 05:02

## 2025-05-20 RX ADMIN — FENTANYL CITRATE 50 MCG: 50 INJECTION, SOLUTION INTRAMUSCULAR; INTRAVENOUS at 10:28

## 2025-05-20 RX ADMIN — ROCURONIUM BROMIDE 50 MG: 50 INJECTION, SOLUTION INTRAVENOUS at 08:42

## 2025-05-20 RX ADMIN — OXYCODONE HYDROCHLORIDE 5 MG: 5 TABLET ORAL at 06:14

## 2025-05-20 RX ADMIN — SUGAMMADEX 200 MG: 100 INJECTION, SOLUTION INTRAVENOUS at 09:39

## 2025-05-20 RX ADMIN — PIPERACILLIN AND TAZOBACTAM 4.5 G: 4; .5 INJECTION, POWDER, FOR SOLUTION INTRAVENOUS at 00:25

## 2025-05-20 RX ADMIN — FENTANYL CITRATE 100 MCG: 50 INJECTION INTRAMUSCULAR; INTRAVENOUS at 08:42

## 2025-05-20 RX ADMIN — HYDROMORPHONE HYDROCHLORIDE 0.4 MG: 0.2 INJECTION, SOLUTION INTRAMUSCULAR; INTRAVENOUS; SUBCUTANEOUS at 12:21

## 2025-05-20 RX ADMIN — ACETAMINOPHEN 650 MG: 325 TABLET, FILM COATED ORAL at 13:14

## 2025-05-20 RX ADMIN — SODIUM CHLORIDE, SODIUM LACTATE, POTASSIUM CHLORIDE, AND CALCIUM CHLORIDE: .6; .31; .03; .02 INJECTION, SOLUTION INTRAVENOUS at 02:07

## 2025-05-20 RX ADMIN — PIPERACILLIN AND TAZOBACTAM 3.38 G: 3; .375 INJECTION, POWDER, FOR SOLUTION INTRAVENOUS at 06:17

## 2025-05-20 RX ADMIN — FENTANYL CITRATE 25 MCG: 50 INJECTION, SOLUTION INTRAMUSCULAR; INTRAVENOUS at 10:14

## 2025-05-20 RX ADMIN — SODIUM CHLORIDE, SODIUM LACTATE, POTASSIUM CHLORIDE, AND CALCIUM CHLORIDE: .6; .31; .03; .02 INJECTION, SOLUTION INTRAVENOUS at 09:27

## 2025-05-20 RX ADMIN — LIDOCAINE HYDROCHLORIDE 50 MG: 20 INJECTION, SOLUTION INFILTRATION; PERINEURAL at 08:42

## 2025-05-20 RX ADMIN — GLYCOPYRROLATE 0.2 MG: 0.2 INJECTION, SOLUTION INTRAMUSCULAR; INTRAVENOUS at 08:50

## 2025-05-20 ASSESSMENT — ACTIVITIES OF DAILY LIVING (ADL)
ADLS_ACUITY_SCORE: 45
ADLS_ACUITY_SCORE: 29
ADLS_ACUITY_SCORE: 29
ADLS_ACUITY_SCORE: 45
ADLS_ACUITY_SCORE: 47
ADLS_ACUITY_SCORE: 45
ADLS_ACUITY_SCORE: 51
ADLS_ACUITY_SCORE: 28
ADLS_ACUITY_SCORE: 45
ADLS_ACUITY_SCORE: 47
ADLS_ACUITY_SCORE: 28
ADLS_ACUITY_SCORE: 41
ADLS_ACUITY_SCORE: 28
ADLS_ACUITY_SCORE: 41
ADLS_ACUITY_SCORE: 45
ADLS_ACUITY_SCORE: 29
ADLS_ACUITY_SCORE: 29
ADLS_ACUITY_SCORE: 45

## 2025-05-20 NOTE — OP NOTE
General Surgery Operative Note      Pre-operative diagnosis: Acute appendicitis   Post-operative diagnosis: Acute phlegmonous appendicitis.    Procedure: Diagnostic laparoscopy with drain placement.   Surgeon: Tra Gore MD   Assistant(s): Merlyn Carrasquillo PA-C  The Physician Assistant was medically necessary for their expertise in prepping, camera management, suctioning, suturing and retraction.   Anesthesia: general    Estimated blood loss:  Complications: 5 cc  none   Specimens: None     DESCRIPTION OF PROCEDURE:  The patient was placed on the table in supine position.  General endotracheal anesthesia was induced and the abdomen was prepped and draped in standard sterile fashion.  An incision was made just above the umbilicus with a blade.  The incision was carried down to the fascia.  Fascia was incised with a blade.  The peritoneum was entered bluntly with a Carmalt clamp.  Two interrupted 0 Vicryl sutures were placed at the extremes of this fascial incision.  The Norma trocar was introduced and the abdomen was insufflated with CO2.  Two 5 mm trocars were placed in the infraumbilical midline, one two fingerbreadths above the pubic symphysis and one MCC between the pubic symphysis and the umbilicus.  The patient was placed in Trendelenburg and right side up.  The cecum and TI were visualized entering the pelvis. I attempted over the course of 30 minutes to safely and bluntly deliver the cecum up into view; it was, however, plastered to the pelvis and immobile. There was no pus noted during manipulation. I considered opening and performing an ileocolic resection versus treating with antibiotics and an interval appendectomy. I also consulted two of my partners who concluded that non-operative management was reasonable. We irrigated the pelvis and placed a drain through our inferior most port site. This was anchored to the skin with a 2-0 Nylon suture. The remaining ports were removed and the  insufflation was released.  An additional interrupted 0 Vicryl suture was placed in the umbilical trocar site fascia.  Each of the three 0 Vicryl sutures was cinched down and tied.  The skin of all 3 incisions was anesthetized with local anesthetic and closed with interrupted 4-0 Vicryl subcuticular sutures and Dermabond.  The patient tolerated the procedure well.  Sponge and instrument counts were correct.    Tra Gore MD

## 2025-05-20 NOTE — ANESTHESIA PREPROCEDURE EVALUATION
Anesthesia Pre-Procedure Evaluation    Patient: Deirdre Zapata   MRN: [id number] : 2000          Procedure : Procedure(s):  APPENDECTOMY, LAPAROSCOPIC         No past medical history on file.   Past Surgical History:   Procedure Laterality Date    DENTAL SURGERY  2017    PODIATRY/FOOT & ANKLE SURGERY REFERRAL Bilateral 2014    Dr. Tompkins    TONSILLECTOMY, ADENOIDECTOMY, COMBINED  2002      Allergies   Allergen Reactions    Other Environmental Allergy Hives and Itching     POLLEN, TREES    Amoxicillin-Pot Clavulanate GI Disturbance    Cats     Gluten Meal     Morphine Nausea and Vomiting    Dog Epithelium (Canis Lupus Familiaris) Hives and Itching      Social History     Tobacco Use    Smoking status: Never     Passive exposure: Never    Smokeless tobacco: Never   Substance Use Topics    Alcohol use: Yes     Alcohol/week: 3.0 standard drinks of alcohol     Types: 3 Standard drinks or equivalent per week     Comment: weekends      Wt Readings from Last 1 Encounters:   25 78.5 kg (173 lb)        Anesthesia Evaluation            ROS/MED HX  ENT/Pulmonary:     (+)                     Intermittent, asthma  Treatment: Inhaler prn,                 Neurologic:       Cardiovascular:       METS/Exercise Tolerance:     Hematologic: Comments: Lab Test        25                       1816          1446          0000          1024          1023          WBC          17.7*        7.5          7.3            < >         --           HGB          11.4*        11.9         12.2           < >         --           MCV          86           89.0         89.5           < >         --           PLT          362          232          283            < >         --           INR           --           --           --           --          1.0            < > = values in this interval not displayed.                  Lab Test        25                        2229          0000          NA           138          139.3         POTASSIUM    4.5          4.74          CHLORIDE     101          105.4         CO2          24           28.7          BUN          11.3         10  11.8     CR           0.87         0.85          ANIONGAP     13            --           JENNIFER          9.1          8.9           GLC          110*         79                (+)      anemia,          Musculoskeletal:       GI/Hepatic: Comment: Celiac disease    (+)         appendicitis,           Renal/Genitourinary:       Endo:     (+)          thyroid problem,            Psychiatric/Substance Use:       Infectious Disease:       Malignancy:       Other:              Physical Exam  Airway  Mallampati: II  TM distance: >3 FB  Neck ROM: full  Mouth opening: >= 4 cm    Cardiovascular - normal exam   Dental   (+) Completely normal teeth      Pulmonary - normal exam      Neurological - normal exam  She appears awake, alert and oriented x3.    Other Findings       OUTSIDE LABS:  CBC:   Lab Results   Component Value Date    WBC 17.7 (H) 05/19/2025    WBC 7.5 03/20/2025    HGB 11.4 (L) 05/19/2025    HGB 11.9 03/20/2025    HCT 35.8 05/19/2025    HCT 38.7 03/20/2025     05/19/2025     03/20/2025     BMP:   Lab Results   Component Value Date     05/19/2025    .3 02/09/2023    POTASSIUM 4.5 05/19/2025    POTASSIUM 4.74 02/09/2023    CHLORIDE 101 05/19/2025    CHLORIDE 105.4 02/09/2023    CO2 24 05/19/2025    CO2 28.7 02/09/2023    BUN 11.3 05/19/2025    BUN 10 02/09/2023    BUN 11.8 02/09/2023    CR 0.87 05/19/2025    CR 0.85 02/09/2023     (H) 05/19/2025    GLC 79 02/09/2023     COAGS:   Lab Results   Component Value Date    INR 1.0 02/09/2023     POC:   Lab Results   Component Value Date    HCGS Negative 05/19/2025     HEPATIC:   Lab Results   Component Value Date    ALBUMIN 3.9 05/19/2025    PROTTOTAL 7.5 05/19/2025    ALT 13 05/19/2025    AST 25 05/19/2025    ALKPHOS 79  "05/19/2025    BILITOTAL 0.2 05/19/2025     OTHER:   Lab Results   Component Value Date    JENNIFER 9.1 05/19/2025    LIPASE 13 05/19/2025    TSH 6.79 (H) 03/20/2025    T4 1.1 03/20/2025    CRP 4.8 03/08/2022    SED 9 03/08/2022       Anesthesia Plan    ASA Status:  2      NPO Status: NPO Appropriate   Anesthesia Type: General.  Induction: intravenous.   Techniques and Equipment:       - Monitoring Plan: standard ASA monitoring     Consents    Anesthesia Plan(s) and associated risks, benefits, and realistic alternatives discussed. Questions answered and patient/representative(s) expressed understanding.     - Discussed: anesthesiologist     - Discussed with:  Patient        - Pt is DNR/DNI Status: no DNR     Blood Consent:      - Discussed with: patient.     Postoperative Care    Pain management: plan for postoperative opioid use.     Comments:                   Rickey Renee MD    I have reviewed the pertinent notes and labs in the chart from the past 30 days and (re)examined the patient.  Any updates or changes from those notes are reflected in this note.    Clinically Significant Risk Factors Present on Admission                             # Obesity: Estimated body mass index is 30.65 kg/m  as calculated from the following:    Height as of this encounter: 1.6 m (5' 3\").    Weight as of this encounter: 78.5 kg (173 lb).       # Asthma: noted on problem list              "

## 2025-05-20 NOTE — ED PROVIDER NOTES
"  Emergency Department Note      History of Present Illness     Chief Complaint   Abdominal Pain    HPI   Deirdre Zapata is a 24 year old female who presents with intermittent generalized abdominal cramping and constipation for approximately 3 days. Patient reports she had a large amount of diarrhea on Friday after taking constipation medications and has not had a formed BM since then (3 days). Reports feeling improved yesterday but today her symptoms worsened again. The patient reports intermittent generalized abdominal pain that is worse in the RLQ and radiates to her lower back.  She also notes nausea, vomiting and a fever of 100 F. She currently denies nausea, dysuria, previous abdominal surgeries or any sick contacts. She was diagnosed with an anal fissure on Friday and has not picked up the lidocaine cream yet. The patient reports she is currently on her menstrual cycle.  Last ate at noon, drank fluids around 6pm.    Independent Historian   None    Review of External Notes   No    Past Medical History     Medical History and Problem List   Celiac disease  Thyroid dysfunction  Asthma  Irregular menses  Anemia    Medications   Prozac  Vestura    Surgical History   Tonsillectomy and adenoidectomy  [place] teeth extractions  Bilateral foot surgery   Colonoscopy  Endoscopy    Physical Exam     Patient Vitals for the past 24 hrs:   BP Temp Temp src Pulse Resp SpO2 Height Weight   05/19/25 1916 137/89 99.2  F (37.3  C) Oral 111 18 99 % 1.6 m (5' 3\") 78.5 kg (173 lb)     Physical Exam  Eyes:  Sclera white; Pupils are equal and round  ENT:    External ears and nares normal  Resp:  Non-labored, no retractions or accessory muscle use  GI:  Abdomen is soft, diffuse mild tenderness, maximal RLQ    No rebound tenderness or peritoneal features  MS:  Moves all extremities  Skin:  Warm and dry  Neuro:  Speech is normal and fluent. No apparent deficit.    Diagnostics     Lab Results   Labs Ordered and Resulted from Time of " ED Arrival to Time of ED Departure   COMPREHENSIVE METABOLIC PANEL - Abnormal       Result Value    Sodium 138      Potassium 4.5      Carbon Dioxide (CO2) 24      Anion Gap 13      Urea Nitrogen 11.3      Creatinine 0.87      GFR Estimate >90      Calcium 9.1      Chloride 101      Glucose 110 (*)     Alkaline Phosphatase 79      AST 25      ALT 13      Protein Total 7.5      Albumin 3.9      Bilirubin Total 0.2     LIPASE - Normal    Lipase 13     HCG QUALITATIVE PREGNANCY - Normal    hCG Serum Qualitative Negative       Imaging   CT Abdomen Pelvis w Contrast   Final Result   IMPRESSION:    1.  Very prominent inflammatory reaction within central pelvis appears to originate near tip of a low lying cecum and strongly favored to represent advanced appendicitis. There is no obvious free perforation, no abscess.   2.  Mildly prominent regional adenopathy in the ileocolic junction presumably reactive.   3.  New 1.1 cm pulmonary nodule right lower lobe favored to be benign but is indeterminate. Consider follow-up chest CT in 3 months or a follow-up PET/CT.      REFERENCE:   Guidelines for Management of Incidental Pulmonary Nodules Detected on CT Images: From the Fleischner Society 2017.    Guidelines apply to incidental nodules in patients who are 35 years or older.   Guidelines do not apply to lung cancer screening, patients with immunosuppression, or patients with known primary cancer.      SINGLE NODULE      Nodule size >8 mm   Either low or high-risk patients:   Consider CT, PET/CT, or tissue sampling at 3 months.           Independent Interpretation   None    ED Course      Medications Administered   Medications   ketorolac (TORADOL) injection 15 mg (15 mg Intravenous $Given 5/19/25 2230)   HYDROmorphone (PF) (DILAUDID) injection 0.5 mg (0.5 mg Intravenous $Given 5/19/25 2335)   iopamidol (ISOVUE-370) solution 500 mL (86 mLs Intravenous $Given 5/19/25 2323)   sodium chloride 0.9 % bag for CT scan flush (62 mLs  Intravenous $Given 5/19/25 3809)     Procedures   Procedures     Discussion of Management   Per ED course    ED Course   ED Course as of 05/20/25 0047   Mon May 19, 2025   2217 I obtained history and examined the patient as noted above.     Tue May 20, 2025   0002 I rechecked the patient and explained findings. We discussed plan for admission and patient is in agreement with plan.      0012 I spoke with Dr. Hastings, General Surgery, regarding the patient. They will accept the patient.          Additional Documentation  None    Medical Decision Making / Diagnosis     CMS Diagnoses: None    MIPS   None               MDM   Deirdre Zapata is a 24 year old year old female who presents with abdominal pain concerning for appendicitis. I also considered renal stone, pyelonephritis, hepatitis, and biliary pathology.  CT scan confirms the presence of appendicitis, and there is no evidence of rupture or abscess at this time. Parenteral antibiotics have been ordered. I discussed the patient with the on call general surgeon and admission was arranged for anticipated surgery time during the day.    Disposition   The patient was admitted to the hospital.     Diagnosis     ICD-10-CM    1. Acute appendicitis with localized peritonitis, without perforation, abscess, or gangrene  K35.30 Case Request: APPENDECTOMY, LAPAROSCOPIC     Case Request: APPENDECTOMY, LAPAROSCOPIC           Scribe Disclosure:  I, Ashlee Guzman, am serving as a scribe at 12:01 AM on 5/20/2025 to document services personally performed by Margarette Cole MD based on my observations and the provider's statements to me.        Margarette Cole MD  05/20/25 0444

## 2025-05-20 NOTE — ANESTHESIA CARE TRANSFER NOTE
Patient: Deirdre Zapata    Procedure: Procedure(s):  Diagnostic Laparoscopy       Diagnosis: Acute appendicitis with localized peritonitis, without perforation, abscess, or gangrene [K35.30]  Diagnosis Additional Information: No value filed.    Anesthesia Type:   General     Note:    Oropharynx: oropharynx clear of all foreign objects and spontaneously breathing  Level of Consciousness: awake  Oxygen Supplementation: face mask  Level of Supplemental Oxygen (L/min / FiO2): 8  Independent Airway: airway patency satisfactory and stable  Dentition: dentition unchanged  Vital Signs Stable: post-procedure vital signs reviewed and stable  Report to RN Given: handoff report given  Patient transferred to: PACU    Handoff Report: Identifed the Patient, Identified the Reponsible Provider, Reviewed the pertinent medical history, Discussed the surgical course, Reviewed Intra-OP anesthesia mangement and issues during anesthesia, Set expectations for post-procedure period and Allowed opportunity for questions and acknowledgement of understanding  Vitals:  Vitals Value Taken Time   /63 05/20/25 09:50   Temp 98.06  F (36.7  C) 05/20/25 09:52   Pulse 108 05/20/25 09:52   Resp 18 05/20/25 09:52   SpO2 99 % 05/20/25 09:52   Vitals shown include unfiled device data.    Electronically Signed By: ERIC Lambert CRNA  May 20, 2025  9:54 AM

## 2025-05-20 NOTE — ANESTHESIA POSTPROCEDURE EVALUATION
Patient: Deirdre Zapata    Procedure: Procedure(s):  Diagnostic Laparoscopy       Anesthesia Type:  General    Note:  Disposition: Inpatient   Postop Pain Control: Uneventful            Sign Out: Well controlled pain   PONV: No   Neuro/Psych: Uneventful            Sign Out: Acceptable/Baseline neuro status   Airway/Respiratory: Uneventful            Sign Out: Acceptable/Baseline resp. status   CV/Hemodynamics: Uneventful            Sign Out: Acceptable CV status; No obvious hypovolemia; No obvious fluid overload   Other NRE: NONE   DID A NON-ROUTINE EVENT OCCUR? No           Last vitals:  Vitals Value Taken Time   /80 05/20/25 12:45   Temp 97.3  F (36.3  C) 05/20/25 12:30   Pulse 108 05/20/25 12:45   Resp 16 05/20/25 12:45   SpO2 73 % 05/20/25 12:47   Vitals shown include unfiled device data.    Electronically Signed By: Rickey Renee MD  May 20, 2025  3:00 PM

## 2025-05-20 NOTE — ED TRIAGE NOTES
Pt was constipated for the last few days, then Friday she had a large amount of diarrhea and then no BM since then. Went to the clinic today because her abdominal pain was so severe. UC showed elevated white count and told her to come here.     Pt has a history of celiac disease, eating less than normal the last few days because of the pain. States it feels like sharp cramping that comes and goes.      Triage Assessment (Adult)       Row Name 05/19/25 1917          Triage Assessment    Airway WDL WDL        Respiratory WDL    Respiratory WDL WDL        Skin Circulation/Temperature WDL    Skin Circulation/Temperature WDL WDL        Cardiac WDL    Cardiac WDL WDL     Cardiac Rhythm NSR        Peripheral/Neurovascular WDL    Peripheral Neurovascular WDL WDL        Cognitive/Neuro/Behavioral WDL    Cognitive/Neuro/Behavioral WDL WDL

## 2025-05-20 NOTE — H&P
"[facility]  General Surgery Consult    Deirdre Zapata MRN# [id number]   Age: 24 year old YOB: 2000     HPI:  The patient has been experiencing acute RLQ and generalized abdominal pain since Friday evening. She thought she was constipated initially. Her pain persisted and progressed over the weekend and is more local in the RLQ. She denies fever, nausea or emesis CT in our ED showed appendicitis.    Similar pain in the past:    No  Diarrhea, now or in the past:   No  Colonoscopy:   No    History is obtained from the patient.    Review Of Systems:  The 10 point review of systems is negative other than noted in the HPI.    PMH:  No past medical history on file.    PSH:  Past Surgical History:   Procedure Laterality Date    DENTAL SURGERY  2017    PODIATRY/FOOT & ANKLE SURGERY REFERRAL Bilateral 2014    Dr. Tompkins    TONSILLECTOMY, ADENOIDECTOMY, COMBINED  2002       Allergies:  Allergies   Allergen Reactions    Other Environmental Allergy Hives and Itching     POLLEN, TREES    Amoxicillin-Pot Clavulanate GI Disturbance    Cats     Gluten Meal     Morphine Nausea and Vomiting    Dog Epithelium (Canis Lupus Familiaris) Hives and Itching       Home Medications:  No current outpatient medications on file.       Social History:  Social History     Tobacco Use    Smoking status: Never     Passive exposure: Never    Smokeless tobacco: Never   Substance Use Topics    Alcohol use: Yes     Alcohol/week: 3.0 standard drinks of alcohol     Types: 3 Standard drinks or equivalent per week     Comment: weekends    Drug use: Never       Family History:  No family history chronic diarrhea, inflammatory bowel disease or colon cancer.  No bleeding disorders, clotting disorders, or problems with anesthesia.    Physical Exam:  /67   Pulse 102   Temp 98.4  F (36.9  C) (Core)   Resp 18   Ht 1.6 m (5' 3\")   Wt 78.5 kg (173 lb)   LMP 05/18/2025 (Exact Date)   SpO2 96%   BMI 30.65 kg/m    General " appearance: Resting Comfortably in bed, no apparent distress  Neck: Supple without thyromegaly, lymphadenopathy, masses  Lungs: Clear to auscultation bilaterally  Heart: regular rate and rhythm, no murmurs, rubs, or gallops  Abdomen: flat, normal bowel sounds    Tenderness: present: RLQ and LLQ moderate, negative rebound tenderness   Masses: none   Organomegaly: none  Extremities: Without clubbing, cyanosis, edema  Neurologic: Grossly intact times four extremities, alert and oriented times three  Psychiatric: Mood and affect are appropriate  Skin: Without lesions or rashes      Labs Reviewed:  Lab Results   Component Value Date    WBC 17.7 05/19/2025    WBC 7.5 03/20/2025     Lab Results   Component Value Date    HGB 11.4 05/19/2025    HGB 11.9 03/20/2025     Lab Results   Component Value Date     05/19/2025     03/20/2025     Last Basic Metabolic Panel:  Lab Results   Component Value Date     05/19/2025    .3 02/09/2023      Lab Results   Component Value Date    POTASSIUM 4.5 05/19/2025    POTASSIUM 4.74 02/09/2023     Lab Results   Component Value Date    CHLORIDE 101 05/19/2025    CHLORIDE 105.4 02/09/2023     Lab Results   Component Value Date    JENNIFER 9.1 05/19/2025    JENNIFER 8.9 02/09/2023     Lab Results   Component Value Date    CO2 24 05/19/2025    CO2 28.7 02/09/2023     Lab Results   Component Value Date    BUN 11.3 05/19/2025    BUN 10 02/09/2023    BUN 11.8 02/09/2023     Lab Results   Component Value Date    CR 0.87 05/19/2025    CR 0.85 02/09/2023     Lab Results   Component Value Date     05/19/2025    GLC 79 02/09/2023       Radiology:  All imaging studies reviewed by me.    Results for orders placed or performed during the hospital encounter of 05/19/25   CT Abdomen Pelvis w Contrast    Narrative    EXAM: CT ABDOMEN PELVIS W CONTRAST  LOCATION: [facility]  DATE: 5/19/2025    INDICATION: generalized abd pain, worse RLQ  COMPARISON:  6/14/2016  TECHNIQUE: CT scan of the abdomen and pelvis was performed following injection of IV contrast. Multiplanar reformats were obtained. Dose reduction techniques were used.  CONTRAST: 86mL Isovue 370    FINDINGS:   LOWER CHEST: Smoothly marginated 1.1 cm nodule right lower lobe abutting right hemidiaphragm likely benign but is new compared to prior study and indeterminant.    HEPATOBILIARY: Normal.    PANCREAS: Normal.    SPLEEN: Normal.    ADRENAL GLANDS: Normal.    KIDNEYS/BLADDER: Normal.    BOWEL: The cecum droops low into the central pelvis, lying adjacent to the anterior surface of the uterus. There is a prominent inflammatory reaction within the central pelvis that appears to extend off the cecal tip and is favored to represent   appendicitis. The very inflamed appendiceal walls are not well defined but there is no surrounding fluid to suggest obvious perforation. Wall thickening involving cecal tip. There is also mild wall thickening of the terminal ileum. Abundant soft tissue   stranding. There is also reactive adenopathy with prominence of lymph nodes in the ileocolic distribution.  There is no bowel obstruction. No free air.    LYMPH NODES: Modest prominence of nodes within the ileocolic distribution, short axis up to 9 mm.    VASCULATURE: Normal.    PELVIC ORGANS: No adnexal mass.    MUSCULOSKELETAL: Normal.      Impression    IMPRESSION:   1.  Very prominent inflammatory reaction within central pelvis appears to originate near tip of a low lying cecum and strongly favored to represent advanced appendicitis. There is no obvious free perforation, no abscess.  2.  Mildly prominent regional adenopathy in the ileocolic junction presumably reactive.  3.  New 1.1 cm pulmonary nodule right lower lobe favored to be benign but is indeterminate. Consider follow-up chest CT in 3 months or a follow-up PET/CT.    REFERENCE:  Guidelines for Management of Incidental Pulmonary Nodules Detected on CT Images: From  the Fleischner Society 2017.   Guidelines apply to incidental nodules in patients who are 35 years or older.  Guidelines do not apply to lung cancer screening, patients with immunosuppression, or patients with known primary cancer.    SINGLE NODULE    Nodule size >8 mm  Either low or high-risk patients:  Consider CT, PET/CT, or tissue sampling at 3 months.           ASSESSMENT/PLAN:  The patient's history, physical exam, laboratory and imaging studies are suspicious for acute appendicitis.  I have offered the patient a laparoscopic appendectomy.      We have had a detailed discussion of nature of appendicitis, the procedure, its risks, benefits, alternatives, recovery, postop limitations, anesthesia, bleeding, blood transfusion,  DVT, PE, postoperative infections, injury to adjacent organs and structures, open conversion, bowel resection, prolonged convalescence in the event of gangrene or perforation of the appendix, abdominal wall hernia, intraabdominal adhesions which can lead to bowel obstruction.  We have discussed interventions and treatment for these complications.  The patient understands the possibility of a diagnosis other than appendicitis.  All questions have been answered to the best of my ability.        She elects to proceed.      Pre-operative antibiotics have been given.       Tra Gore MD

## 2025-05-20 NOTE — ED NOTES
"[facility]  ED Nurse Handoff Report    ED Chief complaint: Abdominal Pain  . ED Diagnosis:   Final diagnoses:   Acute appendicitis with localized peritonitis, without perforation, abscess, or gangrene       Allergies:   Allergies   Allergen Reactions    Other Environmental Allergy Hives and Itching     POLLEN, TREES    Amoxicillin-Pot Clavulanate GI Disturbance    Cats     Gluten Meal     Morphine Nausea and Vomiting    Dog Epithelium (Canis Lupus Familiaris) Hives and Itching       Code Status: Full Code    Activity level - Baseline/Home:  independent.  Activity Level - Current:   independent.   Lift room needed: No.   Bariatric: No   Needed: No   Isolation: No.   Infection: Not Applicable.     Respiratory status: Room air    Vital Signs (within 30 minutes):   Vitals:    05/19/25 1916   BP: 137/89   Pulse: 111   Resp: 18   Temp: 99.2  F (37.3  C)   TempSrc: Oral   SpO2: 99%   Weight: 78.5 kg (173 lb)   Height: 1.6 m (5' 3\")       Cardiac Rhythm:  ,   Cardiac  Cardiac Rhythm: Normal sinus rhythm  Pain level:    Patient confused: No.   Patient Falls Risk: patient and family education.   Elimination Status: Has voided     Patient Report - Initial Complaint: Abd pain.   Focused Assessment: Deirdre Zapata is a 24 year old female who presents with intermittent generalized abdominal cramping and constipation for approximately 3 days. Patient reports she had a large amount of diarrhea on Friday after taking constipation medications and has not had a BM since then (3 days). Reports feeling improved yesterday but today her symptoms worsened again. The patient reports intermittent generalized abdominal pain that is worse in the RLQ and radiates to her lower back.  She also notes nausea, vomiting and a fever of 100 F. She currently denies nausea, dysuria, previous abdominal surgeries or any sick contacts. She was diagnosed with an anal fissure on Friday and has not picked up the lidocaine " cream yet. The patient reports she is currently on her menstrual cycle.         Abnormal Results:   Labs Ordered and Resulted from Time of ED Arrival to Time of ED Departure   COMPREHENSIVE METABOLIC PANEL - Abnormal       Result Value    Sodium 138      Potassium 4.5      Carbon Dioxide (CO2) 24      Anion Gap 13      Urea Nitrogen 11.3      Creatinine 0.87      GFR Estimate >90      Calcium 9.1      Chloride 101      Glucose 110 (*)     Alkaline Phosphatase 79      AST 25      ALT 13      Protein Total 7.5      Albumin 3.9      Bilirubin Total 0.2     LIPASE - Normal    Lipase 13     HCG QUALITATIVE PREGNANCY - Normal    hCG Serum Qualitative Negative          CT Abdomen Pelvis w Contrast   Final Result   IMPRESSION:    1.  Very prominent inflammatory reaction within central pelvis appears to originate near tip of a low lying cecum and strongly favored to represent advanced appendicitis. There is no obvious free perforation, no abscess.   2.  Mildly prominent regional adenopathy in the ileocolic junction presumably reactive.   3.  New 1.1 cm pulmonary nodule right lower lobe favored to be benign but is indeterminate. Consider follow-up chest CT in 3 months or a follow-up PET/CT.      REFERENCE:   Guidelines for Management of Incidental Pulmonary Nodules Detected on CT Images: From the Fleischner Society 2017.    Guidelines apply to incidental nodules in patients who are 35 years or older.   Guidelines do not apply to lung cancer screening, patients with immunosuppression, or patients with known primary cancer.      SINGLE NODULE      Nodule size >8 mm   Either low or high-risk patients:   Consider CT, PET/CT, or tissue sampling at 3 months.             Treatments provided: see MAR  Family Comments: family at bedside  OBS brochure/video discussed/provided to patient:  Yes  ED Medications:   Medications   piperacillin-tazobactam (ZOSYN) 4.5 g vial to attach to  mL bag (has no administration in time range)    ketorolac (TORADOL) injection 15 mg (15 mg Intravenous $Given 5/19/25 6880)   HYDROmorphone (PF) (DILAUDID) injection 0.5 mg (0.5 mg Intravenous $Given 5/19/25 0343)   iopamidol (ISOVUE-370) solution 500 mL (86 mLs Intravenous $Given 5/19/25 0504)   sodium chloride 0.9 % bag for CT scan flush (62 mLs Intravenous $Given 5/19/25 4911)       Drips infusing:  No  For the majority of the shift this patient was Green.   Interventions performed were NA.    Sepsis treatment initiated: No    Cares/treatment/interventions/medications to be completed following ED care: see orders    ED Nurse Name: Caridad Wilburn RN  12:18 AM

## 2025-05-20 NOTE — PHARMACY-ADMISSION MEDICATION HISTORY
Pharmacist Admission Medication History    Admission medication history is complete. The information provided in this note is only as accurate as the sources available at the time of the update.    Information Source(s): Patient via in-person.    Pertinent Information: none.    Changes made to PTA medication list:  Added: synthroid.  Deleted: B complex, ferrous gluconate.  Changed: zyrtec, vit d, airduo inhaler, nasacort.    Allergies reviewed with patient and updates made in EHR: yes.    Medication History Completed By: Selina Gimenez PARVEEN 5/20/2025 2:15 PM    PTA Med List   Medication Sig Note Last Dose/Taking    albuterol (PROAIR HFA/PROVENTIL HFA/VENTOLIN HFA) 108 (90 Base) MCG/ACT inhaler Inhale 2 puffs into the lungs every 4 hours as needed for shortness of breath, wheezing or cough.  Taking As Needed    cetirizine (ZYRTEC) 5 MG tablet Take 10 mg by mouth daily.  Past Week    Cholecalciferol (VITAMIN D3 PO) Take 5,000 Units by mouth 2 times daily 5/20/2025: Usually takes in the winter More than a month    FLUoxetine (PROZAC) 10 MG capsule Take 3 capsules (30 mg) by mouth daily.  5/18/2025    fluticasone-salmeterol (AIRDUO RESPICLICK) 113-14 MCG/ACT inhaler Inhale 1 puff into the lungs 2 times daily. (Patient taking differently: Inhale 1 puff into the lungs 2 times daily as needed (when sick).)  Taking Differently    levothyroxine (SYNTHROID/LEVOTHROID) 25 MCG tablet Take 25 mcg by mouth every morning (before breakfast).  5/19/2025 Morning    Probiotic Product (PROBIOTIC-10 PO) Take 1 capsule by mouth daily. Gluten free. Takes on and off.  Taking    triamcinolone (NASACORT) 55 MCG/ACT nasal aerosol Spray 2 sprays into both nostrils daily as needed (allergy symptoms).  Taking As Needed    VESTURA 3-0.02 MG tablet Take 1 tablet by mouth daily  Taking

## 2025-05-20 NOTE — ANESTHESIA PROCEDURE NOTES
Airway       Patient location during procedure: OR       Procedure Start/Stop Times: 5/20/2025 8:41 AM  Staff -        CRNA: Ishmael Herrmann APRN CRNA       Performed By: CRNA  Consent for Airway        Urgency: elective  Indications and Patient Condition       Indications for airway management: john-procedural       Induction type:intravenous       Mask difficulty assessment: 1 - vent by mask    Final Airway Details       Final airway type: endotracheal airway       Successful airway: ETT - single and Oral  Endotracheal Airway Details        ETT size (mm): 7.0       Cuffed: yes       Successful intubation technique: direct laryngoscopy       DL Blade Type: Pelayo 2       Grade View of Cords: 1       Adjucts: stylet       Position: Right       Measured from: lips       Secured at (cm): 22       Bite block used: None    Post intubation assessment        Placement verified by: capnometry, equal breath sounds and chest rise        Number of attempts at approach: 1       Secured with: tape       Ease of procedure: easy       Dentition: Intact and Unchanged    Medication(s) Administered   Medication Administration Time: 5/20/2025 8:41 AM

## 2025-05-20 NOTE — OR NURSING
Dr. Workman stopped at bedside and explained to the patient what transpired in the OR.    Pt up to the bathroom to void, unable.  Scanned for greater than 374 ml, pt in tears, very uncomfortable. straight cathed for 400cc of clear yellow urine.    Yessi Rush RN on 5/20/2025 at 12:59 PM

## 2025-05-20 NOTE — PROGRESS NOTES
"[facility]    ED Boarding Nurse Handoff Addendum Report:    Date/time: 5/20/2025, 7:30 AM    Activity Level: independent    Fall Risk: Yes:  nonskid shoes/slippers when out of bed and patient and family education    Active Infusions:  ml/hr    Current Meds Due: see mar    Current care needs: Continue with POC    Oxygen requirements (liters/min and/or FiO2): none    Respiratory status: Room air    Vital signs (within last 30 minutes):    Vitals:    05/19/25 1916 05/20/25 0117   BP: 137/89 110/68   BP Location:  Left arm   Patient Position:  Semi-Lala's   Pulse: 111 80   Resp: 18 18   Temp: 99.2  F (37.3  C) 98.9  F (37.2  C)   TempSrc: Oral Oral   SpO2: 99% 99%   Weight: 78.5 kg (173 lb)    Height: 1.6 m (5' 3\")        Focused assessment within last 30 minutes:    Alert and oriented x4, NPO, pain managed by IV Dilaudid, Oxycodone and Tylenol. Pt voids w/o difficulty. Denies nausea.    ED Boarding Nurse name: Jeimy Theodore RN   "

## 2025-05-21 ENCOUNTER — TRANSFERRED RECORDS (OUTPATIENT)
Dept: FAMILY MEDICINE | Facility: CLINIC | Age: 25
End: 2025-05-21

## 2025-05-21 LAB
BASOPHILS # BLD AUTO: 0 10E3/UL (ref 0–0.2)
BASOPHILS NFR BLD AUTO: 0 %
EOSINOPHIL # BLD AUTO: 0.2 10E3/UL (ref 0–0.7)
EOSINOPHIL NFR BLD AUTO: 1 %
ERYTHROCYTE [DISTWIDTH] IN BLOOD BY AUTOMATED COUNT: 13 % (ref 10–15)
HCT VFR BLD AUTO: 29.5 % (ref 35–47)
HGB BLD-MCNC: 9.7 G/DL (ref 11.7–15.7)
IMM GRANULOCYTES # BLD: 0.1 10E3/UL
IMM GRANULOCYTES NFR BLD: 1 %
LYMPHOCYTES # BLD AUTO: 2.7 10E3/UL (ref 0.8–5.3)
LYMPHOCYTES NFR BLD AUTO: 22 %
MCH RBC QN AUTO: 27.9 PG (ref 26.5–33)
MCHC RBC AUTO-ENTMCNC: 32.9 G/DL (ref 31.5–36.5)
MCV RBC AUTO: 85 FL (ref 78–100)
MONOCYTES # BLD AUTO: 1.2 10E3/UL (ref 0–1.3)
MONOCYTES NFR BLD AUTO: 10 %
NEUTROPHILS # BLD AUTO: 8.2 10E3/UL (ref 1.6–8.3)
NEUTROPHILS NFR BLD AUTO: 66 %
NRBC # BLD AUTO: 0 10E3/UL
NRBC BLD AUTO-RTO: 0 /100
PLATELET # BLD AUTO: 285 10E3/UL (ref 150–450)
RBC # BLD AUTO: 3.48 10E6/UL (ref 3.8–5.2)
WBC # BLD AUTO: 12.4 10E3/UL (ref 4–11)

## 2025-05-21 PROCEDURE — 36415 COLL VENOUS BLD VENIPUNCTURE: CPT | Performed by: SURGERY

## 2025-05-21 PROCEDURE — 85041 AUTOMATED RBC COUNT: CPT | Performed by: SURGERY

## 2025-05-21 PROCEDURE — 250N000013 HC RX MED GY IP 250 OP 250 PS 637: Performed by: SURGERY

## 2025-05-21 PROCEDURE — 120N000001 HC R&B MED SURG/OB

## 2025-05-21 PROCEDURE — 258N000003 HC RX IP 258 OP 636: Performed by: SURGERY

## 2025-05-21 PROCEDURE — 250N000011 HC RX IP 250 OP 636: Performed by: SURGERY

## 2025-05-21 RX ORDER — BISACODYL 10 MG
10 SUPPOSITORY, RECTAL RECTAL DAILY PRN
Status: DISCONTINUED | OUTPATIENT
Start: 2025-05-21 | End: 2025-05-23 | Stop reason: HOSPADM

## 2025-05-21 RX ORDER — POLYETHYLENE GLYCOL 3350 17 G/17G
17 POWDER, FOR SOLUTION ORAL DAILY
Status: DISCONTINUED | OUTPATIENT
Start: 2025-05-21 | End: 2025-05-23 | Stop reason: HOSPADM

## 2025-05-21 RX ADMIN — ACETAMINOPHEN 650 MG: 325 TABLET, FILM COATED ORAL at 07:53

## 2025-05-21 RX ADMIN — ACETAMINOPHEN 650 MG: 325 TABLET, FILM COATED ORAL at 21:03

## 2025-05-21 RX ADMIN — SODIUM CHLORIDE, SODIUM LACTATE, POTASSIUM CHLORIDE, AND CALCIUM CHLORIDE: .6; .31; .03; .02 INJECTION, SOLUTION INTRAVENOUS at 01:30

## 2025-05-21 RX ADMIN — PIPERACILLIN AND TAZOBACTAM 3.38 G: 3; .375 INJECTION, POWDER, FOR SOLUTION INTRAVENOUS at 14:29

## 2025-05-21 RX ADMIN — PIPERACILLIN AND TAZOBACTAM 3.38 G: 3; .375 INJECTION, POWDER, FOR SOLUTION INTRAVENOUS at 07:55

## 2025-05-21 RX ADMIN — SENNOSIDES AND DOCUSATE SODIUM 2 TABLET: 50; 8.6 TABLET ORAL at 02:22

## 2025-05-21 RX ADMIN — SODIUM CHLORIDE, SODIUM LACTATE, POTASSIUM CHLORIDE, AND CALCIUM CHLORIDE: .6; .31; .03; .02 INJECTION, SOLUTION INTRAVENOUS at 23:30

## 2025-05-21 RX ADMIN — PIPERACILLIN AND TAZOBACTAM 3.38 G: 3; .375 INJECTION, POWDER, FOR SOLUTION INTRAVENOUS at 20:16

## 2025-05-21 RX ADMIN — OXYCODONE HYDROCHLORIDE 5 MG: 5 TABLET ORAL at 06:18

## 2025-05-21 RX ADMIN — OXYCODONE HYDROCHLORIDE 2.5 MG: 5 TABLET ORAL at 11:00

## 2025-05-21 RX ADMIN — ACETAMINOPHEN 650 MG: 325 TABLET, FILM COATED ORAL at 15:54

## 2025-05-21 RX ADMIN — HYDROMORPHONE HYDROCHLORIDE 0.4 MG: 0.2 INJECTION, SOLUTION INTRAMUSCULAR; INTRAVENOUS; SUBCUTANEOUS at 01:52

## 2025-05-21 RX ADMIN — OXYCODONE HYDROCHLORIDE 5 MG: 5 TABLET ORAL at 21:03

## 2025-05-21 RX ADMIN — ONDANSETRON 4 MG: 4 TABLET, ORALLY DISINTEGRATING ORAL at 21:22

## 2025-05-21 RX ADMIN — PIPERACILLIN AND TAZOBACTAM 3.38 G: 3; .375 INJECTION, POWDER, FOR SOLUTION INTRAVENOUS at 02:23

## 2025-05-21 RX ADMIN — POLYETHYLENE GLYCOL 3350 17 G: 17 POWDER, FOR SOLUTION ORAL at 15:52

## 2025-05-21 ASSESSMENT — ACTIVITIES OF DAILY LIVING (ADL)
ADLS_ACUITY_SCORE: 29
ADLS_ACUITY_SCORE: 29
ADLS_ACUITY_SCORE: 25
ADLS_ACUITY_SCORE: 29
ADLS_ACUITY_SCORE: 25
ADLS_ACUITY_SCORE: 25
ADLS_ACUITY_SCORE: 29
ADLS_ACUITY_SCORE: 25
ADLS_ACUITY_SCORE: 29
DEPENDENT_IADLS:: INDEPENDENT
ADLS_ACUITY_SCORE: 25
ADLS_ACUITY_SCORE: 29
ADLS_ACUITY_SCORE: 25
ADLS_ACUITY_SCORE: 29
ADLS_ACUITY_SCORE: 25
ADLS_ACUITY_SCORE: 29
ADLS_ACUITY_SCORE: 25

## 2025-05-21 NOTE — PLAN OF CARE
"End of Shift Summary  For vital signs and complete assessments, please see documentation flowsheets.     Pertinent assessments: POD #1. VSS. Afebrile. LS clear. BS hypoactive. Pain rated with IV dilaudid x1 and Oxy x1. Denies nausea. Regular/Gluten Free diet. SBA to ambulate. PIV - IVF. Abdominal lap sites KATHIE, no drainage noted. BOB drain dressing with moderate saturation, 60 ml output. Pt has menses. A&O, alarm on bed for safety. Slept well.    Major Shift Events: none    Treatment Plan: Zosyn, Encourage activity, Pain management, Hopeful discharge home soon.      Goal Outcome Evaluation:      Plan of Care Reviewed With: patient    Overall Patient Progress: no change    Outcome Evaluation: Pain meds given, 60 from BOB drain, Slept well.      Problem: Adult Inpatient Plan of Care  Goal: Plan of Care Review  Description: The Plan of Care Review/Shift note should be completed every shift.  The Outcome Evaluation is a brief statement about your assessment that the patient is improving, declining, or no change.  This information will be displayed automatically on your shiftnote.  Outcome: Progressing  Flowsheets (Taken 5/21/2025 0619)  Outcome Evaluation: Pain meds given, 60 from BOB drain, Slept well.  Plan of Care Reviewed With: patient  Overall Patient Progress: no change  Goal: Patient-Specific Goal (Individualized)  Description: You can add care plan individualizations to a care plan. Examples of Individualization might be:  \"Parent requests to be called daily at 9am for status\", \"I have a hard time hearing out of my right ear\", or \"Do not touch me to wake me up as it startlesme\".  Outcome: Progressing  Goal: Absence of Hospital-Acquired Illness or Injury  Outcome: Progressing  Intervention: Identify and Manage Fall Risk  Recent Flowsheet Documentation  Taken 5/20/2025 4873 by Ruthy Denson, RN  Safety Promotion/Fall Prevention:   activity supervised   assistive device/personal items within reach   clutter " free environment maintained   increased rounding and observation   increase visualization of patient   lighting adjusted   mobility aid in reach   nonskid shoes/slippers when out of bed   patient and family education   room near nurse's station   room organization consistent   safety round/check completed   supervised activity   treat reversible contributory factors   treat underlying cause  Intervention: Prevent Skin Injury  Recent Flowsheet Documentation  Taken 5/20/2025 2356 by Ruthy Denson RN  Body Position:   position changed independently   supine, head elevated  Intervention: Prevent and Manage VTE (Venous Thromboembolism) Risk  Recent Flowsheet Documentation  Taken 5/20/2025 2356 by Ruthy Denson RN  VTE Prevention/Management: SCDs on (sequential compression devices)  Intervention: Prevent Infection  Recent Flowsheet Documentation  Taken 5/20/2025 2356 by Ruthy Denson RN  Infection Prevention:   cohorting utilized   rest/sleep promoted   single patient room provided  Goal: Optimal Comfort and Wellbeing  Outcome: Progressing  Goal: Readiness for Transition of Care  Outcome: Progressing     Problem: Skin Injury Risk Increased  Goal: Skin Health and Integrity  Outcome: Progressing  Intervention: Optimize Skin Protection  Recent Flowsheet Documentation  Taken 5/21/2025 0134 by Ruthy Denson RN  Activity Management: ambulated to bathroom  Taken 5/20/2025 2356 by Ruthy Denson RN  Activity Management:   activity adjusted per tolerance   up ad harjeet  Head of Bed (HOB) Positioning: HOB at 15 degrees     Problem: Pain Acute  Goal: Optimal Pain Control and Function  Outcome: Progressing  Intervention: Prevent or Manage Pain  Recent Flowsheet Documentation  Taken 5/20/2025 2356 by Ruthy Denson RN  Medication Review/Management: medications reviewed     Problem: Appendectomy  Goal: Absence of Bleeding  Outcome: Progressing  Goal: Effective Bowel Elimination  Outcome:  Progressing  Goal: Fluid and Electrolyte Balance  Outcome: Progressing  Goal: Absence of Infection Signs and Symptoms  Outcome: Progressing  Goal: Anesthesia/Sedation Recovery  Outcome: Progressing  Intervention: Optimize Anesthesia Recovery  Recent Flowsheet Documentation  Taken 5/20/2025 2356 by Ruthy Denson, RN  Safety Promotion/Fall Prevention:   activity supervised   assistive device/personal items within reach   clutter free environment maintained   increased rounding and observation   increase visualization of patient   lighting adjusted   mobility aid in reach   nonskid shoes/slippers when out of bed   patient and family education   room near nurse's station   room organization consistent   safety round/check completed   supervised activity   treat reversible contributory factors   treat underlying cause  Goal: Acceptable Pain Control  Outcome: Progressing  Goal: Nausea and Vomiting Relief  Outcome: Progressing  Goal: Effective Urinary Elimination  Outcome: Progressing  Goal: Effective Oxygenation and Ventilation  Outcome: Progressing  Intervention: Optimize Oxygenation and Ventilation  Recent Flowsheet Documentation  Taken 5/20/2025 2356 by Ruthy Denson, RN  Head of Bed (HOB) Positioning: HOB at 15 degrees

## 2025-05-21 NOTE — PLAN OF CARE
"To Do:  End of Shift Summary  For vital signs and complete assessments, please see documentation flowsheets.     Pertinent assessments: Pt A&Ox4. Up SBA. VSS, on RA. No N/V, or SOB. Prn tylenol x1. PRN oxy x1. No BM this shift. IVF @ 100mL. BOB output with 45mL this shift. Dressing changed this shift. Voiding adequately; bladder scanned at 1130 for 190mL.     Major Shift Events none    Treatment Plan: IV Abx. Pain management. Encourage movement. Monitor BOB output.    Bedside Nurse: Lidia Be RN       Goal Outcome Evaluation:           Overall Patient Progress: no change    Overall Patient Progress: no change             Problem: Adult Inpatient Plan of Care  Goal: Plan of Care Review  Description: The Plan of Care Review/Shift note should be completed every shift.  The Outcome Evaluation is a brief statement about your assessment that the patient is improving, declining, or no change.  This information will be displayed automatically on your shiftnote.  Outcome: Progressing  Flowsheets (Taken 5/21/2025 1438)  Overall Patient Progress: no change  Goal: Patient-Specific Goal (Individualized)  Description: You can add care plan individualizations to a care plan. Examples of Individualization might be:  \"Parent requests to be called daily at 9am for status\", \"I have a hard time hearing out of my right ear\", or \"Do not touch me to wake me up as it startlesme\".  Outcome: Progressing  Goal: Absence of Hospital-Acquired Illness or Injury  Outcome: Progressing  Intervention: Identify and Manage Fall Risk  Recent Flowsheet Documentation  Taken 5/21/2025 0803 by Lidia Be, RN  Safety Promotion/Fall Prevention:   activity supervised   increased rounding and observation  Intervention: Prevent Skin Injury  Recent Flowsheet Documentation  Taken 5/21/2025 0803 by Lidia Be, RN  Body Position:   position changed independently   supine, head elevated  Intervention: Prevent and Manage VTE (Venous Thromboembolism) " Risk  Recent Flowsheet Documentation  Taken 5/21/2025 0803 by Lidia Be RN  VTE Prevention/Management: SCDs off (sequential compression devices)  Intervention: Prevent Infection  Recent Flowsheet Documentation  Taken 5/21/2025 0803 by Lidia Be RN  Infection Prevention:   hand hygiene promoted   rest/sleep promoted  Goal: Optimal Comfort and Wellbeing  Outcome: Progressing  Intervention: Monitor Pain and Promote Comfort  Recent Flowsheet Documentation  Taken 5/21/2025 0803 by Lidia Be RN  Pain Management Interventions: medication (see MAR)  Goal: Readiness for Transition of Care  Outcome: Progressing     Problem: Skin Injury Risk Increased  Goal: Skin Health and Integrity  Outcome: Progressing  Intervention: Optimize Skin Protection  Recent Flowsheet Documentation  Taken 5/21/2025 0803 by Lidia Be RN  Activity Management: ambulated to bathroom  Head of Bed (HOB) Positioning: HOB at 20-30 degrees     Problem: Pain Acute  Goal: Optimal Pain Control and Function  Outcome: Progressing  Intervention: Develop Pain Management Plan  Recent Flowsheet Documentation  Taken 5/21/2025 0803 by Lidia Be RN  Pain Management Interventions: medication (see MAR)  Intervention: Prevent or Manage Pain  Recent Flowsheet Documentation  Taken 5/21/2025 0803 by Lidia Be RN  Medication Review/Management: medications reviewed     Problem: Appendectomy  Goal: Absence of Bleeding  Outcome: Progressing  Goal: Effective Bowel Elimination  Outcome: Progressing  Goal: Fluid and Electrolyte Balance  Outcome: Progressing  Goal: Absence of Infection Signs and Symptoms  Outcome: Progressing  Goal: Anesthesia/Sedation Recovery  Outcome: Progressing  Intervention: Optimize Anesthesia Recovery  Recent Flowsheet Documentation  Taken 5/21/2025 0803 by Lidia Be RN  Safety Promotion/Fall Prevention:   activity supervised   increased rounding and observation  Goal: Acceptable Pain Control  Outcome:  Progressing  Intervention: Prevent or Manage Pain  Recent Flowsheet Documentation  Taken 5/21/2025 0803 by Lidia Be, RN  Pain Management Interventions: medication (see MAR)  Goal: Nausea and Vomiting Relief  Outcome: Progressing  Goal: Effective Urinary Elimination  Outcome: Progressing  Goal: Effective Oxygenation and Ventilation  Outcome: Progressing  Intervention: Optimize Oxygenation and Ventilation  Recent Flowsheet Documentation  Taken 5/21/2025 0803 by Lidia Be, RN  Head of Bed (HOB) Positioning: HOB at 20-30 degrees

## 2025-05-21 NOTE — PLAN OF CARE
"Pertinent assessments: A&Ox4. On room air. Capno monitoring in place. Tolerating a gluten free, regular diet. Up with SBA. IV fluids infusing via PIV. BOB x1 with serosanguinous output. Hesitancy with voiding. X2 lap sites with skin glue and open to air. Pain managed with PRN IV Dilaudid and PRN PO oxycodone.     Major Shift Events: none.    Treatment Plan: Gluten free diet, BOB x1, IV fluids, IV Zosyn, pain management, capno, and discharge pending.    Bedside Nurse: Luis Strauss, RN     Problem: Adult Inpatient Plan of Care  Goal: Plan of Care Review  Description: The Plan of Care Review/Shift note should be completed every shift.  The Outcome Evaluation is a brief statement about your assessment that the patient is improving, declining, or no change.  This information will be displayed automatically on your shiftnote.  Outcome: Progressing  Flowsheets (Taken 5/20/2025 9525)  Outcome Evaluation: Continue IV fluids, IV antibiotics, and pain management.  Plan of Care Reviewed With: patient  Overall Patient Progress: no change  Goal: Patient-Specific Goal (Individualized)  Description: You can add care plan individualizations to a care plan. Examples of Individualization might be:  \"Parent requests to be called daily at 9am for status\", \"I have a hard time hearing out of my right ear\", or \"Do not touch me to wake me up as it startlesme\".  Outcome: Progressing  Goal: Absence of Hospital-Acquired Illness or Injury  Outcome: Progressing  Intervention: Identify and Manage Fall Risk  Recent Flowsheet Documentation  Taken 5/20/2025 1904 by Luis Strauss, RN  Safety Promotion/Fall Prevention:   activity supervised   clutter free environment maintained   increase visualization of patient   lighting adjusted   nonskid shoes/slippers when out of bed   patient and family education   room near nurse's station   safety round/check completed   supervised activity   treat reversible contributory factors  Intervention: Prevent " Skin Injury  Recent Flowsheet Documentation  Taken 5/20/2025 1526 by Luis Strauss RN  Body Position: position changed independently  Intervention: Prevent and Manage VTE (Venous Thromboembolism) Risk  Recent Flowsheet Documentation  Taken 5/20/2025 1904 by Luis Strauss RN  VTE Prevention/Management: SCDs on (sequential compression devices)  Intervention: Prevent Infection  Recent Flowsheet Documentation  Taken 5/20/2025 1904 by Luis Strauss RN  Infection Prevention:   cohorting utilized   hand hygiene promoted   rest/sleep promoted   single patient room provided  Goal: Optimal Comfort and Wellbeing  Outcome: Progressing  Intervention: Monitor Pain and Promote Comfort  Recent Flowsheet Documentation  Taken 5/20/2025 1856 by Luis Strauss RN  Pain Management Interventions: medication (see MAR)  Taken 5/20/2025 1538 by Luis Strauss RN  Pain Management Interventions: medication (see MAR)  Goal: Readiness for Transition of Care  Outcome: Progressing     Goal Outcome Evaluation:      Plan of Care Reviewed With: patient    Overall Patient Progress: no change    Overall Patient Progress: no change    Outcome Evaluation: Continue IV fluids, IV antibiotics, and pain management.

## 2025-05-21 NOTE — PROGRESS NOTES
"[facility]   General Surgery Progress Note         Assessment and Plan:   Assessment:   POD#1 s/p diagnostic laparoscopy (aborted appendectomy) and drain placement  Postoperative ileus as expected - passed some flatus      Plan:   -Diet as tolerated  -IV fluids  -IV ABX: Zosyn  -Increase activity as tolerated, walk halls and sit up in chair  -Labs pending  -Continue drain, will remove prior to discharge  -Continue conservative management of appendicitis     Still having quite a bit of pain/pressure in the lower abdomen. Unsure if emptying bladder. Requested PVRs.   Bowel regiment for constipation. Discussed no need for BM prior to discharge if able to tolerate diet and passing flatus.  Possible discharge tomorrow  Tosha Herman MD          Interval History:   Resting in bed. Afebrile. Has some abdominal pain as expected. Has some discomfort when urinating. Tolerating some diet. Passed a small amount of flatus.          Physical Exam:   Blood pressure 102/55, pulse 86, temperature 98.5  F (36.9  C), temperature source Oral, resp. rate 18, height 1.6 m (5' 3\"), weight 78.5 kg (173 lb), last menstrual period 05/18/2025, SpO2 97%.    I/O last 3 completed shifts:  In: 3051 [P.O.:640; I.V.:2411]  Out: 1095 [Urine:925; Drains:170]    Abdomen: soft, ND, +mild diffuse tenderness, +BS  Inc(s) - clean, dry, intact with dermabond glue, no erythema            Data:     Recent Labs   Lab Test 05/19/25  1816 03/20/25  1446 07/29/24  0000   HGB 11.4* 11.9 12.2   WBC 17.7* 7.5 7.3          Prakash Bateman PA-C  Text page [phone] 8am-4pm  After 4pm call [phone]     "

## 2025-05-21 NOTE — CONSULTS
Care Management Initial Consult    General Information  Assessment completed with: Yennifer Rodriguez  Type of CM/SW Visit: Initial Assessment    Primary Care Provider verified and updated as needed: Yes   Readmission within the last 30 days: no previous admission in last 30 days      Reason for Consult: discharge planning  Advance Care Planning: Advance Care Planning Reviewed: no concerns identified          Communication Assessment  Patient's communication style: spoken language (English or Bilingual)    Hearing Difficulty or Deaf: no   Wear Glasses or Blind: yes    Cognitive  Cognitive/Neuro/Behavioral: WDL  Level of Consciousness: alert  Arousal Level: opens eyes spontaneously  Orientation: oriented x 4        Speech: clear, spontaneous, logical    Living Environment:   People in home:  (roommate)     Current living Arrangements: apartment      Able to return to prior arrangements: yes       Family/Social Support:  Care provided by: self  Provides care for: no one  Marital Status: Single  Support system: Parent(s)          Description of Support System: Involved, Supportive    Support Assessment: Adequate family and caregiver support, Adequate social supports    Current Resources:   Patient receiving home care services: No        Community Resources: None  Equipment currently used at home: none  Supplies currently used at home: None    Employment/Financial:  Employment Status: employed full-time        Financial Concerns: unable to afford food   Referral to Financial Worker: No       Does the patient's insurance plan have a 3 day qualifying hospital stay waiver?  No    Lifestyle & Psychosocial Needs:  Social Drivers of Health     Food Insecurity: High Risk (5/20/2025)    Food Insecurity     Within the past 12 months, did you worry that your food would run out before you got money to buy more?: Yes     Within the past 12 months, did the food you bought just not last and you didn t have money to get more?: Yes    Depression: Not at risk (3/24/2025)    PHQ-2     PHQ-2 Score: 1   Housing Stability: Low Risk  (5/20/2025)    Housing Stability     Do you have housing? : Yes     Are you worried about losing your housing?: No   Tobacco Use: Low Risk  (5/19/2025)    Patient History     Smoking Tobacco Use: Never     Smokeless Tobacco Use: Never     Passive Exposure: Never   Financial Resource Strain: Low Risk  (5/20/2025)    Financial Resource Strain     Within the past 12 months, have you or your family members you live with been unable to get utilities (heat, electricity) when it was really needed?: No   Alcohol Use: Not on file   Transportation Needs: Low Risk  (5/20/2025)    Transportation Needs     Within the past 12 months, has lack of transportation kept you from medical appointments, getting your medicines, non-medical meetings or appointments, work, or from getting things that you need?: No   Physical Activity: Not on file   Interpersonal Safety: Low Risk  (5/20/2025)    Interpersonal Safety     Do you feel physically and emotionally safe where you currently live?: Yes     Within the past 12 months, have you been hit, slapped, kicked or otherwise physically hurt by someone?: No     Within the past 12 months, have you been humiliated or emotionally abused in other ways by your partner or ex-partner?: No   Stress: Not on file   Social Connections: Unknown (12/27/2021)    Received from [facility] [employer] [facility] [address] [facility]    Social Connections     Frequency of Communication with Friends and Family: Not on file   Health Literacy: Not on file       Functional Status:  Prior to admission patient needed assistance:   Dependent ADLs:: Independent  Dependent IADLs:: Independent       Mental Health Status:  Mental Health Status: No Current Concerns       Chemical Dependency Status:  Chemical Dependency Status: No Current Concerns           Discussed  Partnership in Safe Discharge Planning  document with patient/family:  No      Care Management Discharge Note    Discharge Date: 05/23/2025       Discharge Disposition: Home    Discharge Services:  None    Discharge DME: None    Discharge Transportation: family or friend will provide    Private pay costs discussed: Not applicable    Does the patient's insurance plan have a 3 day qualifying hospital stay waiver?  No    PAS Confirmation Code: N/A  Patient/family educated on Medicare website which has current facility and service quality ratings:  (N/A)    Education Provided on the Discharge Plan: Yes  Persons Notified of Discharge Plans: Pt  Patient/Family in Agreement with the Plan: yes    Handoff Referral Completed: No, handoff not indicated or clinically appropriate    Additional Information:  Sw was consulted to see the pt for financial/insurance concerns. Sw met with the pt and her mom who was at the bedside. Pt was agreeable to having her mom stay for the visit. Pt lives in an apartment with a roommate. She said that she has been financially stressed with student loans, credit card debt, her car payment, apartment rent, hospital bills from a surgery in August 2024 and now this current hospitalization. She said that she knows that once she recovers from this hospitalization, she knows that there will be another surgery that she will have to go through. On 6/1/25, she will be taken off of her father's insurance which is currently her supplement insurance, since she will be 25 years old. She will still have her primary insurance since that is through her employer.  See provided her with the [facility] Community Resource Guide. See sent a message to the FC Department, to see if they can follow up with the pt regarding a [employer] application.    Sw addressed their questions and concerns.       No further care management intervention anticipated at this time. Please re-consult if further needs arise. Care management signing off.      ABNER Oliva, [facility]  Inpatient Care  Coordination  [facility]  [phone]

## 2025-05-22 VITALS
HEART RATE: 85 BPM | DIASTOLIC BLOOD PRESSURE: 80 MMHG | RESPIRATION RATE: 18 BRPM | OXYGEN SATURATION: 95 % | BODY MASS INDEX: 30.65 KG/M2 | WEIGHT: 173 LBS | TEMPERATURE: 98.3 F | SYSTOLIC BLOOD PRESSURE: 123 MMHG | HEIGHT: 63 IN

## 2025-05-22 LAB
HOLD SPECIMEN: NORMAL
MCV RBC AUTO: 86 FL (ref 78–100)
WBC # BLD AUTO: 14.3 10E3/UL (ref 4–11)

## 2025-05-22 PROCEDURE — 85048 AUTOMATED LEUKOCYTE COUNT: CPT | Performed by: SURGERY

## 2025-05-22 PROCEDURE — 250N000011 HC RX IP 250 OP 636: Performed by: SURGERY

## 2025-05-22 PROCEDURE — 250N000013 HC RX MED GY IP 250 OP 250 PS 637: Performed by: SURGERY

## 2025-05-22 PROCEDURE — 36415 COLL VENOUS BLD VENIPUNCTURE: CPT | Performed by: SURGERY

## 2025-05-22 PROCEDURE — 258N000003 HC RX IP 258 OP 636: Performed by: SURGERY

## 2025-05-22 PROCEDURE — 120N000001 HC R&B MED SURG/OB

## 2025-05-22 RX ORDER — LEVOTHYROXINE SODIUM 25 UG/1
25 TABLET ORAL
Status: DISCONTINUED | OUTPATIENT
Start: 2025-05-23 | End: 2025-05-22

## 2025-05-22 RX ORDER — LEVOTHYROXINE SODIUM 25 UG/1
25 TABLET ORAL
Status: DISCONTINUED | OUTPATIENT
Start: 2025-05-22 | End: 2025-05-23 | Stop reason: HOSPADM

## 2025-05-22 RX ORDER — LACTOBACILLUS RHAMNOSUS GG 10B CELL
1 CAPSULE ORAL 2 TIMES DAILY
Status: DISCONTINUED | OUTPATIENT
Start: 2025-05-22 | End: 2025-05-23 | Stop reason: HOSPADM

## 2025-05-22 RX ADMIN — SODIUM CHLORIDE, SODIUM LACTATE, POTASSIUM CHLORIDE, AND CALCIUM CHLORIDE: .6; .31; .03; .02 INJECTION, SOLUTION INTRAVENOUS at 10:29

## 2025-05-22 RX ADMIN — PIPERACILLIN AND TAZOBACTAM 3.38 G: 3; .375 INJECTION, POWDER, FOR SOLUTION INTRAVENOUS at 14:31

## 2025-05-22 RX ADMIN — ACETAMINOPHEN 650 MG: 325 TABLET, FILM COATED ORAL at 23:02

## 2025-05-22 RX ADMIN — ACETAMINOPHEN 650 MG: 325 TABLET, FILM COATED ORAL at 01:54

## 2025-05-22 RX ADMIN — PIPERACILLIN AND TAZOBACTAM 3.38 G: 3; .375 INJECTION, POWDER, FOR SOLUTION INTRAVENOUS at 01:54

## 2025-05-22 RX ADMIN — ONDANSETRON 4 MG: 4 TABLET, ORALLY DISINTEGRATING ORAL at 16:31

## 2025-05-22 RX ADMIN — PIPERACILLIN AND TAZOBACTAM 3.38 G: 3; .375 INJECTION, POWDER, FOR SOLUTION INTRAVENOUS at 20:09

## 2025-05-22 RX ADMIN — Medication 1 CAPSULE: at 20:09

## 2025-05-22 RX ADMIN — Medication 1 CAPSULE: at 10:24

## 2025-05-22 RX ADMIN — LEVOTHYROXINE SODIUM 25 MCG: 0.03 TABLET ORAL at 10:24

## 2025-05-22 RX ADMIN — ONDANSETRON 4 MG: 2 INJECTION, SOLUTION INTRAMUSCULAR; INTRAVENOUS at 07:39

## 2025-05-22 RX ADMIN — ACETAMINOPHEN 650 MG: 325 TABLET, FILM COATED ORAL at 10:41

## 2025-05-22 RX ADMIN — PIPERACILLIN AND TAZOBACTAM 3.38 G: 3; .375 INJECTION, POWDER, FOR SOLUTION INTRAVENOUS at 07:43

## 2025-05-22 RX ADMIN — OXYCODONE HYDROCHLORIDE 2.5 MG: 5 TABLET ORAL at 16:31

## 2025-05-22 RX ADMIN — SODIUM CHLORIDE, SODIUM LACTATE, POTASSIUM CHLORIDE, AND CALCIUM CHLORIDE: .6; .31; .03; .02 INJECTION, SOLUTION INTRAVENOUS at 20:09

## 2025-05-22 RX ADMIN — FLUOXETINE 30 MG: 10 CAPSULE ORAL at 10:26

## 2025-05-22 RX ADMIN — OXYCODONE HYDROCHLORIDE 2.5 MG: 5 TABLET ORAL at 23:02

## 2025-05-22 RX ADMIN — SENNOSIDES AND DOCUSATE SODIUM 2 TABLET: 50; 8.6 TABLET ORAL at 00:26

## 2025-05-22 ASSESSMENT — ACTIVITIES OF DAILY LIVING (ADL)
ADLS_ACUITY_SCORE: 25
ADLS_ACUITY_SCORE: 29
ADLS_ACUITY_SCORE: 25
ADLS_ACUITY_SCORE: 25
ADLS_ACUITY_SCORE: 29
ADLS_ACUITY_SCORE: 25
ADLS_ACUITY_SCORE: 29
ADLS_ACUITY_SCORE: 25
ADLS_ACUITY_SCORE: 29
ADLS_ACUITY_SCORE: 25
ADLS_ACUITY_SCORE: 29
ADLS_ACUITY_SCORE: 25
ADLS_ACUITY_SCORE: 29
ADLS_ACUITY_SCORE: 29
ADLS_ACUITY_SCORE: 25
ADLS_ACUITY_SCORE: 25
ADLS_ACUITY_SCORE: 29

## 2025-05-22 NOTE — PLAN OF CARE
"Pertinent assessments: POD #1. A&Ox4. On room air. Tolerating a gluten free diet. Up with SBA. IV fluids infusing via PIV. BOB x1 with serosanguinous output. X2 lap sites with skin glue and open to air. PRN ODT Zofran given x1. PRN PO Tylenol given x1 and PRN PO oxycodone given x1.      Major Shift Events: none.     Treatment Plan: Gluten free diet, BOB x1, IV fluids, IV Zosyn, pain management, capno, and discharge pending.     Bedside Nurse: Luis Strauss RN    Problem: Adult Inpatient Plan of Care  Goal: Plan of Care Review  Description: The Plan of Care Review/Shift note should be completed every shift.  The Outcome Evaluation is a brief statement about your assessment that the patient is improving, declining, or no change.  This information will be displayed automatically on your shiftnote.  Outcome: Progressing  Flowsheets (Taken 5/21/2025 2235)  Outcome Evaluation: Continue plan of care.  Plan of Care Reviewed With: patient  Overall Patient Progress: improving  Goal: Patient-Specific Goal (Individualized)  Description: You can add care plan individualizations to a care plan. Examples of Individualization might be:  \"Parent requests to be called daily at 9am for status\", \"I have a hard time hearing out of my right ear\", or \"Do not touch me to wake me up as it startlesme\".  Outcome: Progressing  Goal: Absence of Hospital-Acquired Illness or Injury  Outcome: Progressing  Intervention: Identify and Manage Fall Risk  Recent Flowsheet Documentation  Taken 5/21/2025 1517 by Luis Strauss RN  Safety Promotion/Fall Prevention:   nonskid shoes/slippers when out of bed   patient and family education   safety round/check completed   treat reversible contributory factors  Intervention: Prevent Skin Injury  Recent Flowsheet Documentation  Taken 5/21/2025 1517 by Luis Strauss RN  Body Position: position changed independently  Intervention: Prevent Infection  Recent Flowsheet Documentation  Taken 5/21/2025 1517 by " Luis Strauss, RN  Infection Prevention:   cohorting utilized   hand hygiene promoted   rest/sleep promoted   single patient room provided  Goal: Optimal Comfort and Wellbeing  Outcome: Progressing  Intervention: Monitor Pain and Promote Comfort  Recent Flowsheet Documentation  Taken 5/21/2025 2103 by Luis Strauss, RN  Pain Management Interventions: medication (see MAR)  Taken 5/21/2025 1554 by Luis Strauss RN  Pain Management Interventions: medication (see MAR)  Goal: Readiness for Transition of Care  Outcome: Progressing     Goal Outcome Evaluation:      Plan of Care Reviewed With: patient    Overall Patient Progress: improving    Overall Patient Progress: improving    Outcome Evaluation: Continue plan of care.

## 2025-05-22 NOTE — PROGRESS NOTES
"[facility]   General Surgery Progress Note         Assessment and Plan:   Assessment:   POD#2 s/p diagnostic laparoscopy (aborted appendectomy) and drain placement  Postoperative ileus as expected   Hgb down to 9.7, likely dilutional  leukocytosis      Plan:   -minimize PO until bloating/nausea improved  -IV fluids  -IV ABX: Zosyn  -Increase activity as tolerated, walk halls and sit up in chair  -Labs pending  -Continue drain, will remove prior to discharge  -Continue conservative management of appendicitis  Seen and agree; more bloated than before but not ill.  Will restart home medications and add probiotic.  As before, CT in about 4-6 weeks with eventual interval appendectomy  Could go later today if feeling better.           Interval History:    Afebrile. Feels worse today with general malaise and nausea. Still bloated with pressure discomfort at low abdomen. Ate hamburger last night, minimal PO today. + passing flatus and having loose stools.          Physical Exam:   Blood pressure 129/79, pulse 101, temperature 98.7  F (37.1  C), temperature source Oral, resp. rate 16, height 1.6 m (5' 3\"), weight 78.5 kg (173 lb), last menstrual period 05/18/2025, SpO2 98%.    I/O last 3 completed shifts:  In: 1128 [P.O.:200; I.V.:928]  Out: 2240 [Urine:2050; Drains:190]    Abdomen: soft, distended and tender across low abdomen  Inc(s) - clean, dry, intact with dermabond glue, no erythema    Drain output serous          Data:     Recent Labs     Recent Labs   Lab 05/22/25  0637 05/21/25  0737 05/19/25  1816   WBC 14.3* 12.4* 17.7*   HGB  --  9.7* 11.4*   HCT  --  29.5* 35.8   MCV 86 85 86   PLT  --  285 362          Merlyn Carrasquillo PA-C    "

## 2025-05-22 NOTE — PLAN OF CARE
"End of Shift Summary  For vital signs and complete assessments, please see documentation flowsheets.     Pertinent assessments: POD #2. VSS. Afebrile. LS clear. BS hypoactive, pt is passing gas, Senna given, small BM reported per patient. Pain 5/10, treated with tylenol. Gluten Free diet. Independently ambulating. PIV - IVF. Abdominal incisions KATHIE, no drainage noted. BOB drain with 800 ml output.A&O, calls appropriately for assistance. Slept on/off.    Major Shift Events: none    Treatment Plan: Zosyn, Encourage activity, Hopeful discharge home today.      Goal Outcome Evaluation:      Plan of Care Reviewed With: patient    Overall Patient Progress: improving    Outcome Evaluation: Pt had small BM, Tylenol for pain, BOB drain wtih 80 ml output, Slept on/off.      Problem: Adult Inpatient Plan of Care  Goal: Plan of Care Review  Description: The Plan of Care Review/Shift note should be completed every shift.  The Outcome Evaluation is a brief statement about your assessment that the patient is improving, declining, or no change.  This information will be displayed automatically on your shiftnote.  Outcome: Progressing  Flowsheets (Taken 5/22/2025 0619)  Outcome Evaluation: Pt had small BM, Tylenol for pain, BOB drain wtih 80 ml output, Slept on/off.  Plan of Care Reviewed With: patient  Overall Patient Progress: improving  Goal: Patient-Specific Goal (Individualized)  Description: You can add care plan individualizations to a care plan. Examples of Individualization might be:  \"Parent requests to be called daily at 9am for status\", \"I have a hard time hearing out of my right ear\", or \"Do not touch me to wake me up as it startlesme\".  Outcome: Progressing  Goal: Absence of Hospital-Acquired Illness or Injury  Outcome: Progressing  Intervention: Identify and Manage Fall Risk  Recent Flowsheet Documentation  Taken 5/22/2025 0027 by Ruthy Denson RN  Safety Promotion/Fall Prevention:   assistive device/personal " items within reach   clutter free environment maintained   increased rounding and observation   increase visualization of patient   lighting adjusted   mobility aid in reach   nonskid shoes/slippers when out of bed   patient and family education   room organization consistent   safety round/check completed   treat reversible contributory factors   treat underlying cause  Intervention: Prevent Skin Injury  Recent Flowsheet Documentation  Taken 5/22/2025 0027 by Ruthy Denson RN  Body Position:   position changed independently   supine, head elevated  Intervention: Prevent and Manage VTE (Venous Thromboembolism) Risk  Recent Flowsheet Documentation  Taken 5/22/2025 0027 by Ruthy Denson RN  VTE Prevention/Management: SCDs off (sequential compression devices)  Intervention: Prevent Infection  Recent Flowsheet Documentation  Taken 5/22/2025 0027 by Ruthy Denson RN  Infection Prevention:   cohorting utilized   rest/sleep promoted   single patient room provided  Goal: Optimal Comfort and Wellbeing  Outcome: Progressing  Intervention: Monitor Pain and Promote Comfort  Recent Flowsheet Documentation  Taken 5/22/2025 0154 by Ruthy Denson RN  Pain Management Interventions: medication (see MAR)  Taken 5/22/2025 0027 by Ruthy Denson RN  Pain Management Interventions: distraction  Goal: Readiness for Transition of Care  Outcome: Progressing     Problem: Skin Injury Risk Increased  Goal: Skin Health and Integrity  Outcome: Progressing  Intervention: Optimize Skin Protection  Recent Flowsheet Documentation  Taken 5/22/2025 0558 by Ruthy Denson RN  Activity Management: ambulated to bathroom  Taken 5/22/2025 0154 by Ruthy Denson RN  Activity Management: ambulated to bathroom  Taken 5/22/2025 0027 by Ruthy Denson RN  Activity Management:   activity adjusted per tolerance   up ad harjeet  Head of Bed (HOB) Positioning: HOB at 15 degrees     Problem: Pain  Acute  Goal: Optimal Pain Control and Function  Outcome: Progressing  Intervention: Develop Pain Management Plan  Recent Flowsheet Documentation  Taken 5/22/2025 0154 by Ruthy Denson RN  Pain Management Interventions: medication (see MAR)  Taken 5/22/2025 0027 by Ruthy Denson RN  Pain Management Interventions: distraction  Intervention: Prevent or Manage Pain  Recent Flowsheet Documentation  Taken 5/22/2025 0027 by Ruthy Denson RN  Medication Review/Management: medications reviewed     Problem: Appendectomy  Goal: Absence of Bleeding  Outcome: Progressing  Goal: Effective Bowel Elimination  Outcome: Progressing  Goal: Fluid and Electrolyte Balance  Outcome: Progressing  Goal: Absence of Infection Signs and Symptoms  Outcome: Progressing  Goal: Anesthesia/Sedation Recovery  Outcome: Progressing  Intervention: Optimize Anesthesia Recovery  Recent Flowsheet Documentation  Taken 5/22/2025 0027 by Ruthy Denson RN  Safety Promotion/Fall Prevention:   assistive device/personal items within reach   clutter free environment maintained   increased rounding and observation   increase visualization of patient   lighting adjusted   mobility aid in reach   nonskid shoes/slippers when out of bed   patient and family education   room organization consistent   safety round/check completed   treat reversible contributory factors   treat underlying cause  Goal: Acceptable Pain Control  Outcome: Progressing  Intervention: Prevent or Manage Pain  Recent Flowsheet Documentation  Taken 5/22/2025 0154 by Ruthy Denson RN  Pain Management Interventions: medication (see MAR)  Taken 5/22/2025 0027 by Ruthy Denson RN  Pain Management Interventions: distraction  Goal: Nausea and Vomiting Relief  Outcome: Progressing  Goal: Effective Urinary Elimination  Outcome: Progressing  Goal: Effective Oxygenation and Ventilation  Outcome: Progressing  Intervention: Optimize Oxygenation and  Ventilation  Recent Flowsheet Documentation  Taken 5/22/2025 0027 by Ruthy Denson, RN  Head of Bed (HOB) Positioning: HOB at 15 degrees

## 2025-05-22 NOTE — PLAN OF CARE
Goal Outcome Evaluation:      Plan of Care Reviewed With: patient, parent    Overall Patient Progress: improvingOverall Patient Progress: improving    End of Shift Summary  For vital signs and complete assessments, please see documentation flowsheets.     Pertinent assessments: POD #2. VSS.  Had some nausea and diarrhea this morning.  Given zofran with some relief.   Not tolerating much intake, had some pudding and some water. Having pain 4/10, just given Tylenol and ice.  BOB drain in place, saturated around dressing and changed. 40 ml out serosanguinous output. Lap sites covered with dermabond.  Ambulating independently.     Major Shift Events: nausea and diarrhea    Treatment Plan: Zosyn, tolerate solids, plan for discharge tomorrow.     Bedside Nurse: Steve Vargas RN        Problem: Adult Inpatient Plan of Care  Goal: Plan of Care Review  Description: The Plan of Care Review/Shift note should be completed every shift.  The Outcome Evaluation is a brief statement about your assessment that the patient is improving, declining, or no change.  This information will be displayed automatically on your shiftnote.  Flowsheets (Taken 5/22/2025 1614)  Plan of Care Reviewed With:   patient   parent  Overall Patient Progress: improving  Goal: Absence of Hospital-Acquired Illness or Injury  Intervention: Identify and Manage Fall Risk  Recent Flowsheet Documentation  Taken 5/22/2025 0800 by Yessica Vargas RN  Safety Promotion/Fall Prevention:   patient and family education   nonskid shoes/slippers when out of bed   safety round/check completed  Intervention: Prevent Skin Injury  Recent Flowsheet Documentation  Taken 5/22/2025 0800 by Yessica Vargas RN  Body Position: position changed independently  Intervention: Prevent Infection  Recent Flowsheet Documentation  Taken 5/22/2025 0800 by Yessica Vargas RN  Infection Prevention:   single patient room provided   rest/sleep promoted   hand hygiene promoted

## 2025-05-23 VITALS
HEIGHT: 63 IN | WEIGHT: 173 LBS | SYSTOLIC BLOOD PRESSURE: 118 MMHG | HEART RATE: 87 BPM | OXYGEN SATURATION: 96 % | RESPIRATION RATE: 18 BRPM | TEMPERATURE: 99.2 F | BODY MASS INDEX: 30.65 KG/M2 | DIASTOLIC BLOOD PRESSURE: 68 MMHG

## 2025-05-23 LAB
MCV RBC AUTO: 85 FL (ref 78–100)
WBC # BLD AUTO: 12.2 10E3/UL (ref 4–11)

## 2025-05-23 PROCEDURE — 250N000011 HC RX IP 250 OP 636: Mod: JZ | Performed by: SURGERY

## 2025-05-23 PROCEDURE — 250N000013 HC RX MED GY IP 250 OP 250 PS 637: Performed by: SURGERY

## 2025-05-23 PROCEDURE — 250N000011 HC RX IP 250 OP 636: Performed by: SURGERY

## 2025-05-23 PROCEDURE — 85048 AUTOMATED LEUKOCYTE COUNT: CPT | Performed by: PHYSICIAN ASSISTANT

## 2025-05-23 PROCEDURE — 36415 COLL VENOUS BLD VENIPUNCTURE: CPT | Performed by: PHYSICIAN ASSISTANT

## 2025-05-23 RX ORDER — SACCHAROMYCES BOULARDII 250 MG
250 CAPSULE ORAL 2 TIMES DAILY
Qty: 28 CAPSULE | Refills: 0 | Status: SHIPPED | OUTPATIENT
Start: 2025-05-23 | End: 2025-06-06

## 2025-05-23 RX ORDER — ONDANSETRON 4 MG/1
4 TABLET, ORALLY DISINTEGRATING ORAL EVERY 6 HOURS PRN
Qty: 20 TABLET | Refills: 0 | Status: SHIPPED | OUTPATIENT
Start: 2025-05-23

## 2025-05-23 RX ORDER — OXYCODONE HYDROCHLORIDE 5 MG/1
5 TABLET ORAL EVERY 4 HOURS PRN
Qty: 12 TABLET | Refills: 0 | Status: SHIPPED | OUTPATIENT
Start: 2025-05-23

## 2025-05-23 RX ADMIN — PIPERACILLIN AND TAZOBACTAM 3.38 G: 3; .375 INJECTION, POWDER, FOR SOLUTION INTRAVENOUS at 02:10

## 2025-05-23 RX ADMIN — HYDROMORPHONE HYDROCHLORIDE 0.4 MG: 0.2 INJECTION, SOLUTION INTRAMUSCULAR; INTRAVENOUS; SUBCUTANEOUS at 10:08

## 2025-05-23 RX ADMIN — PIPERACILLIN AND TAZOBACTAM 3.38 G: 3; .375 INJECTION, POWDER, FOR SOLUTION INTRAVENOUS at 08:04

## 2025-05-23 RX ADMIN — ACETAMINOPHEN 650 MG: 325 TABLET, FILM COATED ORAL at 08:46

## 2025-05-23 RX ADMIN — Medication 1 CAPSULE: at 08:04

## 2025-05-23 RX ADMIN — FLUOXETINE 30 MG: 10 CAPSULE ORAL at 08:04

## 2025-05-23 RX ADMIN — LEVOTHYROXINE SODIUM 25 MCG: 0.03 TABLET ORAL at 06:31

## 2025-05-23 ASSESSMENT — ACTIVITIES OF DAILY LIVING (ADL)
ADLS_ACUITY_SCORE: 29
ADLS_ACUITY_SCORE: 29
ADLS_ACUITY_SCORE: 25
ADLS_ACUITY_SCORE: 25
ADLS_ACUITY_SCORE: 29
ADLS_ACUITY_SCORE: 25
ADLS_ACUITY_SCORE: 29

## 2025-05-23 NOTE — PROGRESS NOTES
"[facility]   General Surgery Progress Note         Assessment and Plan:   Assessment:   POD#3 s/p diagnostic laparoscopy (aborted appendectomy) and drain placement  Postoperative ileus as expected, resolving   WBC trending down.       Plan:   -check WBC per patient request  -drain removed today  -discharge today. Rx Augmentin, Oxycodone, Zofran, probiotic.  -our clinic will call patient to arrange CT in about 4-6 weeks with clinic follow up to discuss eventual interval appendectomy  -work note given         Interval History:    Afebrile. Feels ok today, bloating slightly improved. Poor appetite but tolerated banana for breakfast and tolerating PO fluids well. + passing flatus and loose stools. Interested in going home today. Tolerated drain removal at bedside.         Physical Exam:   Blood pressure 121/75, pulse 73, temperature 98.5  F (36.9  C), temperature source Oral, resp. rate 18, height 1.6 m (5' 3\"), weight 78.5 kg (173 lb), last menstrual period 05/18/2025, SpO2 97%.    I/O last 3 completed shifts:  In: 345 [I.V.:345]  Out: 100 [Drains:100]    Abdomen: soft, distended and tender across low abdomen  Inc(s) - clean, dry, intact with dermabond glue, no erythema    Drain output serous. Drain removed and gauze/tegaderm placed.          Data:     Recent Labs     Recent Labs   Lab 05/22/25  0637 05/21/25  0737 05/19/25  1816   WBC 14.3* 12.4* 17.7*   HGB  --  9.7* 11.4*   HCT  --  29.5* 35.8   MCV 86 85 86   PLT  --  285 362          Merlyn Carrasquillo PA-C    "

## 2025-05-23 NOTE — PLAN OF CARE
"Summary: POD#2 s/p diagnostic laparoscopy (aborted appendectomy) and drain placement   Orientation: A&O x 4  Vitals/Tele: VSS on RA  IV Access/drains: PIV infusing LR @  100mL; BOB drain to suction (minimal serous output)  Diet: Gluten free  Mobility: up ad harjeet  GI/: Continent of B&B; several episodes of diarrhea this afternoon; nausea - zofran x1  Wound/Skin: Lap sites WNL; drain site UTV  Discharge Plan: Tomorrow    See Flow sheets for assessment     Goal Outcome Evaluation:    Plan of Care Reviewed With: patient, parent  Overall Patient Progress: improving  Outcome Evaluation: A&O x 4; Pain 5/10 (oxy 1x); VSS on RA; Multiple episodes of diarrhea; tolerating oral fluids    Problem: Adult Inpatient Plan of Care  Goal: Plan of Care Review  Description: The Plan of Care Review/Shift note should be completed every shift.  The Outcome Evaluation is a brief statement about your assessment that the patient is improving, declining, or no change.  This information will be displayed automatically on your shiftnote.  Outcome: Progressing  Flowsheets (Taken 5/22/2025 2028)  Outcome Evaluation:   A&O x 4   Pain 5/10 (oxy 1x)   VSS on RA   Multiple episodes of diarrhea   tolerating oral fluids  Plan of Care Reviewed With:   patient   parent  Overall Patient Progress: improving  Goal: Patient-Specific Goal (Individualized)  Description: You can add care plan individualizations to a care plan. Examples of Individualization might be:  \"Parent requests to be called daily at 9am for status\", \"I have a hard time hearing out of my right ear\", or \"Do not touch me to wake me up as it startlesme\".  Outcome: Progressing  Goal: Absence of Hospital-Acquired Illness or Injury  Outcome: Progressing  Intervention: Identify and Manage Fall Risk  Recent Flowsheet Documentation  Taken 5/22/2025 1630 by Celeste Almanza RN  Safety Promotion/Fall Prevention: safety round/check completed  Intervention: Prevent Infection  Recent Flowsheet " Documentation  Taken 5/22/2025 1630 by Celeste Almanza, RN  Infection Prevention:   rest/sleep promoted   single patient room provided  Goal: Optimal Comfort and Wellbeing  Outcome: Progressing  Goal: Readiness for Transition of Care  Outcome: Progressing

## 2025-05-23 NOTE — PLAN OF CARE
"CARE FROM [id number]    /80 (BP Location: Right arm)   Pulse 85   Temp 98.2  F (36.8  C) (Oral)   Resp 18   Ht 1.6 m (5' 3\")   Wt 78.5 kg (173 lb)   LMP 05/18/2025 (Exact Date)   SpO2 98%   BMI 30.65 kg/m      Goal Outcome Evaluation:    Overall Patient Progress: improvingOverall Patient Progress: improving    Outcome Evaluation: A&O x4. VSS, RA. Up ad harjeet. LR at 100 ml/hr. Voiding. Passing gas. One ep of loose stool. Denies nausea/cici liquids. Pain managed w/ tyl and oxy x1. Using ice intermittently as well. BOB drain intact-- new dressing in place, old fully saturated (serous drainage). Lap sites KATHIE. Continuing zosyn q6h at this time. Expected discharge back home today.    Problem: Adult Inpatient Plan of Care  Goal: Plan of Care Review  Description: The Plan of Care Review/Shift note should be completed every shift.  The Outcome Evaluation is a brief statement about your assessment that the patient is improving, declining, or no change.  This information will be displayed automatically on your shiftnote.  Outcome: Progressing  Flowsheets (Taken 5/23/2025 0307)  Outcome Evaluation: A&O x4. VSS, RA. Up ad harjeet. LR at 100 ml/hr. Voiding. Passing gas. One ep of loose stool. Denies nausea/cici liquids. Pain managed w/ tyl and oxy x1. Using ice intermittently as well. BOB drain intact-- new dressing in place, old fully saturated (serous drainage). Lap sites KATHIE. Continuing zosyn q6h at this time. Expected discharge back home today.  Overall Patient Progress: improving  Goal: Patient-Specific Goal (Individualized)  Description: You can add care plan individualizations to a care plan. Examples of Individualization might be:  \"Parent requests to be called daily at 9am for status\", \"I have a hard time hearing out of my right ear\", or \"Do not touch me to wake me up as it startlesme\".  Outcome: Progressing  Goal: Absence of Hospital-Acquired Illness or Injury  Outcome: Progressing  Intervention: Identify and Manage " Fall Risk  Recent Flowsheet Documentation  Taken 5/22/2025 2040 by Sudha Bruno, RN  Safety Promotion/Fall Prevention:   clutter free environment maintained   safety round/check completed   nonskid shoes/slippers when out of bed   patient and family education  Intervention: Prevent Infection  Recent Flowsheet Documentation  Taken 5/22/2025 2040 by Sudha Bruno, RN  Infection Prevention:   rest/sleep promoted   single patient room provided  Goal: Optimal Comfort and Wellbeing  Outcome: Progressing  Intervention: Monitor Pain and Promote Comfort  Recent Flowsheet Documentation  Taken 5/22/2025 2302 by Sudha Bruno, RN  Pain Management Interventions: medication (see MAR)  Taken 5/22/2025 2258 by Sudha Bruno RN  Pain Management Interventions: cold applied  Taken 5/22/2025 2014 by Sudha Bruno, RN  Pain Management Interventions: rest  Goal: Readiness for Transition of Care  Outcome: Progressing

## 2025-05-23 NOTE — DISCHARGE INSTRUCTIONS
"HOME CARE FOLLOWING APPENDECTOMY  EFREN Daily, HELEN Jarrett, TAY Villa, JAIMIE Herman    FOLLOW-UP AFTER SURGERY:  -Our office will contact you to arrange CT scan in 4-6 weeks and clinic follow up with Dr. Conley.  -If you have any questions or concerns, please call us at [phone].  We are located at: [address], [address]; [place] [id number]    INCISIONAL CARE:  Keep drain site covered with bandage until no longer draining. Dermabond (a type of skin glue) is present, leave in place until it wears/flakes off (2-3 weeks).     BATHING:  OK to shower 48 hours after surgery.  Avoid baths for 1 week after surgery.  You may wash your hair at any time.  Gently pat your incision dry after bathing.  Do not apply lotions, creams, or ointments to incisions.    ACTIVITY:  Light Activity -- you may immediately be up and about as tolerated.  Walking is encouraged, increase as tolerated.  Driving/Light Work-- when comfortable and off narcotic pain medications.  Strenuous Work/Activity -- limit lifting to 20 pounds for 2 weeks.  Progressively increase with time.  Active Sports (running, biking, etc.) -- cautiously resume after 2 weeks.    DIET:  Start with liquids and gradually resume your regular diet as tolerated.  Consider eating yogurt or taking a probiotic to help your \"gut pooja\" (good bacteria in the bowel/colon) return to normal while taking or after receiving antibiotics.  Drink plenty of fluids.  While taking pain medications, consider use of a stool softener, increase your fiber in your diet, or add a fiber supplement (like Metamucil, Citrucel) to help prevent constipation - a possible side effect of pain medications.    NAUSEA:  If nauseated from the anesthetic/pain meds; rest in bed, get up cautiously with assistance, and drink clear liquids (juice, tea, broth).      -CONTACT US IF THE FOLLOWING DEVELOPS:   1. A fever that is above 101     2. Increased redness, warmth, drainage, " bleeding, or swelling.   3. Pain that is not relieved by rest/ice and your prescription.   4.  Increasing pain after 48 hours.   5. Drainage that is thick, cloudy, yellow, green or white.   6. Any other questions or concerns.

## 2025-05-23 NOTE — PLAN OF CARE
"End of Shift Summary  For vital signs and complete assessments, please see documentation flowsheets.     Pertinent assessments:   Pt A&Ox4. Up ind. Pain well controlled w/ ice application, tylenol. IV dilaudid given as pre med prior to BOB drain removal. BOB site covered w/ gauze. Lap site intact w/ glue.       Discharge Note  Patient discharged to: home  Accompanied by: mother  Prescriptions: filled and sent with pt  Belongings reviewed and sent with patient.   Discharge instructions given to patient, questions answered.       Problem: Adult Inpatient Plan of Care  Goal: Plan of Care Review  Description: The Plan of Care Review/Shift note should be completed every shift.  The Outcome Evaluation is a brief statement about your assessment that the patient is improving, declining, or no change.  This information will be displayed automatically on your shiftnote.  Outcome: Progressing  Flowsheets (Taken 5/23/2025 1119)  Outcome Evaluation: pain well managed. Denies nausea.  Plan of Care Reviewed With:   patient   parent  Overall Patient Progress: improving  Goal: Patient-Specific Goal (Individualized)  Description: You can add care plan individualizations to a care plan. Examples of Individualization might be:  \"Parent requests to be called daily at 9am for status\", \"I have a hard time hearing out of my right ear\", or \"Do not touch me to wake me up as it startlesme\".  Outcome: Progressing  Goal: Absence of Hospital-Acquired Illness or Injury  Outcome: Progressing  Intervention: Identify and Manage Fall Risk  Recent Flowsheet Documentation  Taken 5/23/2025 0810 by Loree Gustafson RN  Safety Promotion/Fall Prevention:   clutter free environment maintained   safety round/check completed   nonskid shoes/slippers when out of bed   patient and family education  Intervention: Prevent Skin Injury  Recent Flowsheet Documentation  Taken 5/23/2025 0810 by Loree Gustafson RN  Body Position: position changed " independently  Intervention: Prevent Infection  Recent Flowsheet Documentation  Taken 5/23/2025 0810 by Loree Gustafson RN  Infection Prevention:   rest/sleep promoted   single patient room provided  Goal: Optimal Comfort and Wellbeing  Outcome: Progressing  Intervention: Monitor Pain and Promote Comfort  Recent Flowsheet Documentation  Taken 5/23/2025 0846 by Loree Gustafson RN  Pain Management Interventions: medication (see MAR)  Taken 5/23/2025 0810 by Loree Gustafson RN  Pain Management Interventions: cold applied  Goal: Readiness for Transition of Care  Outcome: Progressing     Problem: Skin Injury Risk Increased  Goal: Skin Health and Integrity  Outcome: Progressing  Intervention: Optimize Skin Protection  Recent Flowsheet Documentation  Taken 5/23/2025 0810 by Loree Gustafson RN  Activity Management: up ad harjeet  Head of Bed (HOB) Positioning: HOB at 30 degrees     Problem: Pain Acute  Goal: Optimal Pain Control and Function  Outcome: Progressing  Intervention: Develop Pain Management Plan  Recent Flowsheet Documentation  Taken 5/23/2025 0846 by Loree Gustafson RN  Pain Management Interventions: medication (see MAR)  Taken 5/23/2025 0810 by Loree Gustafson RN  Pain Management Interventions: cold applied  Intervention: Prevent or Manage Pain  Recent Flowsheet Documentation  Taken 5/23/2025 0810 by Loree Gustafson RN  Medication Review/Management: medications reviewed     Problem: Appendectomy  Goal: Absence of Bleeding  Outcome: Progressing  Goal: Effective Bowel Elimination  Outcome: Progressing  Goal: Fluid and Electrolyte Balance  Outcome: Progressing  Goal: Absence of Infection Signs and Symptoms  Outcome: Progressing  Goal: Anesthesia/Sedation Recovery  Outcome: Progressing  Intervention: Optimize Anesthesia Recovery  Recent Flowsheet Documentation  Taken 5/23/2025 0810 by Loree Gustafson RN  Safety Promotion/Fall Prevention:   clutter free environment maintained   safety round/check  completed   nonskid shoes/slippers when out of bed   patient and family education  Goal: Acceptable Pain Control  Outcome: Progressing  Intervention: Prevent or Manage Pain  Recent Flowsheet Documentation  Taken 5/23/2025 0846 by Loree Gustafson RN  Pain Management Interventions: medication (see MAR)  Taken 5/23/2025 0810 by Loree Gustafson, RN  Pain Management Interventions: cold applied  Goal: Nausea and Vomiting Relief  Outcome: Progressing  Goal: Effective Urinary Elimination  Outcome: Progressing  Goal: Effective Oxygenation and Ventilation  Outcome: Progressing  Intervention: Optimize Oxygenation and Ventilation  Recent Flowsheet Documentation  Taken 5/23/2025 0810 by Loree Gustafson RN  Head of Bed (HOB) Positioning: HOB at 30 degrees   Goal Outcome Evaluation:      Plan of Care Reviewed With: patient, parent    Overall Patient Progress: improvingOverall Patient Progress: improving    Outcome Evaluation: pain well managed. Denies nausea.

## 2025-06-10 ENCOUNTER — TELEPHONE (OUTPATIENT)
Dept: SURGERY | Facility: CLINIC | Age: 25
End: 2025-06-10
Payer: COMMERCIAL

## 2025-06-10 NOTE — CONFIDENTIAL NOTE
Attempted to call patient for post op check.  No answer.  Message was left for patient to call back if they had any questions of concerns.     Merlyn Carrasquillo PA-C

## 2025-06-16 DIAGNOSIS — F41.1 GAD (GENERALIZED ANXIETY DISORDER): ICD-10-CM

## 2025-06-16 RX ORDER — FLUOXETINE 10 MG/1
30 CAPSULE ORAL DAILY
COMMUNITY
Start: 2025-06-16

## 2025-06-16 NOTE — TELEPHONE ENCOUNTER
Refused Prescriptions:                       Disp   Refills    FLUoxetine (PROZAC) 10 MG capsule [Pharmac*                Sig: TAKE 3 CAPSULES BY MOUTH DAILY  Refused By: LUCIA MARTINEZ  Reason for Refusal: OTHER     Lucia

## 2025-06-17 ENCOUNTER — OFFICE VISIT (OUTPATIENT)
Dept: FAMILY MEDICINE | Facility: CLINIC | Age: 25
End: 2025-06-17

## 2025-06-17 VITALS
OXYGEN SATURATION: 97 % | HEART RATE: 99 BPM | WEIGHT: 171 LBS | DIASTOLIC BLOOD PRESSURE: 72 MMHG | SYSTOLIC BLOOD PRESSURE: 104 MMHG | BODY MASS INDEX: 30.29 KG/M2

## 2025-06-17 DIAGNOSIS — F41.1 GAD (GENERALIZED ANXIETY DISORDER): ICD-10-CM

## 2025-06-17 DIAGNOSIS — R91.8 PULMONARY NODULES: Primary | ICD-10-CM

## 2025-06-17 DIAGNOSIS — K35.30 ACUTE APPENDICITIS WITH LOCALIZED PERITONITIS, WITHOUT PERFORATION, ABSCESS, OR GANGRENE: ICD-10-CM

## 2025-06-17 PROCEDURE — G2211 COMPLEX E/M VISIT ADD ON: HCPCS | Performed by: STUDENT IN AN ORGANIZED HEALTH CARE EDUCATION/TRAINING PROGRAM

## 2025-06-17 PROCEDURE — 71046 X-RAY EXAM CHEST 2 VIEWS: CPT | Performed by: STUDENT IN AN ORGANIZED HEALTH CARE EDUCATION/TRAINING PROGRAM

## 2025-06-17 PROCEDURE — 99214 OFFICE O/P EST MOD 30 MIN: CPT | Performed by: STUDENT IN AN ORGANIZED HEALTH CARE EDUCATION/TRAINING PROGRAM

## 2025-06-17 RX ORDER — FLUOXETINE 10 MG/1
30 CAPSULE ORAL DAILY
Qty: 270 CAPSULE | Refills: 0 | Status: SHIPPED | OUTPATIENT
Start: 2025-06-17 | End: 2025-06-17

## 2025-06-17 RX ORDER — FLUOXETINE HYDROCHLORIDE 40 MG/1
40 CAPSULE ORAL DAILY
Qty: 90 CAPSULE | Refills: 0 | Status: SHIPPED | OUTPATIENT
Start: 2025-06-17

## 2025-06-17 RX ORDER — FERROUS SULFATE 325(65) MG
325 TABLET ORAL DAILY
COMMUNITY
Start: 2025-05-20

## 2025-06-17 RX ORDER — FOLIC ACID 1 MG/1
1000 TABLET ORAL DAILY
COMMUNITY
Start: 2025-05-20

## 2025-06-17 ASSESSMENT — ANXIETY QUESTIONNAIRES
5. BEING SO RESTLESS THAT IT IS HARD TO SIT STILL: NOT AT ALL
1. FEELING NERVOUS, ANXIOUS, OR ON EDGE: NOT AT ALL
GAD7 TOTAL SCORE: 2
7. FEELING AFRAID AS IF SOMETHING AWFUL MIGHT HAPPEN: NOT AT ALL
3. WORRYING TOO MUCH ABOUT DIFFERENT THINGS: NOT AT ALL
IF YOU CHECKED OFF ANY PROBLEMS ON THIS QUESTIONNAIRE, HOW DIFFICULT HAVE THESE PROBLEMS MADE IT FOR YOU TO DO YOUR WORK, TAKE CARE OF THINGS AT HOME, OR GET ALONG WITH OTHER PEOPLE: SOMEWHAT DIFFICULT
2. NOT BEING ABLE TO STOP OR CONTROL WORRYING: SEVERAL DAYS
6. BECOMING EASILY ANNOYED OR IRRITABLE: NOT AT ALL
GAD7 TOTAL SCORE: 2

## 2025-06-17 ASSESSMENT — PATIENT HEALTH QUESTIONNAIRE - PHQ9
SUM OF ALL RESPONSES TO PHQ QUESTIONS 1-9: 4
5. POOR APPETITE OR OVEREATING: SEVERAL DAYS

## 2025-06-17 NOTE — PATIENT INSTRUCTIONS
Increase prozac to 40mg daily     CT scan of lungs 8/18 or later  [facility]   [facility]  [address]  [address]  [facility] [address]  [phone] -  Main Scheduling    Surgical follow-up as planned     Follow-up in 6 months, sooner if needed

## 2025-06-17 NOTE — PROGRESS NOTES
Assessment & Plan     1. JERRY (generalized anxiety disorder)  Prozac has been helpful. R/b increasing dose reviewed  - FLUoxetine (PROZAC) 40 MG capsule; Take 1 capsule (40 mg) by mouth daily.  Dispense: 90 capsule; Refill: 0    2. Pulmonary nodules (Primary)  1.1 cm RLL, no risk factors, 3 mo follow-up CT ordered as recommended by Radiology  - CT Chest w/o Contrast  - Radiology Referral (Affiliate Use Only); Future    3. Acute appendicitis with localized peritonitis, without perforation, abscess, or gangrene  Unfortunate course, hospital notes reviewed, surgical follow-up as planned     Patient Instructions   Increase prozac to 40mg daily     CT scan of lungs 8/18 or later  [facility]   [facility]  [address]  [address]  [facility] [address] [phone] -  Main Scheduling    Surgical follow-up as planned     Follow-up in 6 months, sooner if needed       Rickey Renee MD, [facility]      Subjective     Deirdre Zapata is a 25 year old female who presents to clinic today for the following health issues:    HPI   Chief Complaint   Patient presents with    Recheck Medication     Non fasting medication check and review, needs refills of fluoxetine today, feels that it is working well, no negative side effects, wants to stay on same dose, psychiatrist left clinic and has not been able to find a new one to establish care with yet       Started on synthroid per Endo  Recent appendicitis course reviewed, feeling better overall. Follow-up CT scheduled this week, expecting repeat surgery in ~2 weeks.    Anxiety   How are you doing with your anxiety since your last visit? Improving with prozac, a lot of stress around health recently  Do you have any concerns with your use of alcohol or other drugs? No    Social History     Tobacco Use    Smoking status: Never     Passive exposure: Never    Smokeless tobacco: Never   Substance Use Topics    Alcohol use: Yes      Alcohol/week: 3.0 standard drinks of alcohol     Types: 3 Standard drinks or equivalent per week     Comment: weekends    Drug use: Never         10/14/2024     4:12 PM 3/24/2025     2:08 PM 6/17/2025     3:26 PM   JERRY-7 SCORE   Total Score 4 5 2         10/14/2024     4:12 PM 3/24/2025     2:08 PM 6/17/2025     3:26 PM   PHQ   PHQ-9 Total Score 2 3 4   Q9: Thoughts of better off dead/self-harm past 2 weeks Not at all Not at all Not at all             Objective    /72 (BP Location: Left arm, Patient Position: Sitting, Cuff Size: Adult Large)   Pulse 99   Wt 77.6 kg (171 lb)   LMP 06/16/2025 (Exact Date)   SpO2 97%   BMI 30.29 kg/m    Body mass index is 30.29 kg/m .  Alert, NAD  NC/AT  Sclerae anicteric  Resp nonlabored  Skin warm and dry  No focal neuro deficits. Speech intact. Normal gait.  Appropriate affect       Labs reviewed.  CT Abdomen Pelvis w Contrast  Result Date: 5/19/2025  EXAM: CT ABDOMEN PELVIS W CONTRAST LOCATION: [facility] DATE: 5/19/2025 INDICATION: generalized abd pain, worse RLQ COMPARISON: 6/14/2016 TECHNIQUE: CT scan of the abdomen and pelvis was performed following injection of IV contrast. Multiplanar reformats were obtained. Dose reduction techniques were used. CONTRAST: 86mL Isovue 370 FINDINGS: LOWER CHEST: Smoothly marginated 1.1 cm nodule right lower lobe abutting right hemidiaphragm likely benign but is new compared to prior study and indeterminant. HEPATOBILIARY: Normal. PANCREAS: Normal. SPLEEN: Normal. ADRENAL GLANDS: Normal. KIDNEYS/BLADDER: Normal. BOWEL: The cecum droops low into the central pelvis, lying adjacent to the anterior surface of the uterus. There is a prominent inflammatory reaction within the central pelvis that appears to extend off the cecal tip and is favored to represent appendicitis. The very inflamed appendiceal walls are not well defined but there is no surrounding fluid to suggest obvious perforation. Wall thickening involving  cecal tip. There is also mild wall thickening of the terminal ileum. Abundant soft tissue stranding. There is also reactive adenopathy with prominence of lymph nodes in the ileocolic distribution. There is no bowel obstruction. No free air. LYMPH NODES: Modest prominence of nodes within the ileocolic distribution, short axis up to 9 mm. VASCULATURE: Normal. PELVIC ORGANS: No adnexal mass. MUSCULOSKELETAL: Normal.     IMPRESSION: 1.  Very prominent inflammatory reaction within central pelvis appears to originate near tip of a low lying cecum and strongly favored to represent advanced appendicitis. There is no obvious free perforation, no abscess. 2.  Mildly prominent regional adenopathy in the ileocolic junction presumably reactive. 3.  New 1.1 cm pulmonary nodule right lower lobe favored to be benign but is indeterminate. Consider follow-up chest CT in 3 months or a follow-up PET/CT. REFERENCE: Guidelines for Management of Incidental Pulmonary Nodules Detected on CT Images: From the Fleischner Society 2017. Guidelines apply to incidental nodules in patients who are 35 years or older. Guidelines do not apply to lung cancer screening, patients with immunosuppression, or patients with known primary cancer. SINGLE NODULE Nodule size >8 mm Either low or high-risk patients: Consider CT, PET/CT, or tissue sampling at 3 months.     XR Abdomen 2 Views  Result Date: 5/19/2025  EXAM: XR ABDOMEN 2 VIEWS LOCATION: [facility] DATE: 5/19/2025 INDICATION:  Abdominal pain, epigastric COMPARISON: CT abdomen/pelvis 6/14/2016.     IMPRESSION: Mildly dilated small bowel loops in the left lower quadrant. Large amount of stool within normal caliber colon. Findings are nonspecific but favored for focal ileus/peristalsis versus less likely developing small bowel obstruction. No pneumoperitoneum.

## 2025-06-17 NOTE — NURSING NOTE
Chief Complaint   Patient presents with    Recheck Medication     Non fasting medication check and review, needs refills of fluoxetine today, feels that it is working well, no negative side effects, wants to stay on same dose, psychiatrist left clinic and has not been able to find a new one to establish care with yet      Pre-visit Screening:  Immunizations:  up to date  Colonoscopy:  na  Mammogram: na  Asthma Action Test/Plan:  na  PHQ9:  given today   GAD7:  given today   Questioned patient about current smoking habits Pt. has never smoked.  Ok to leave detailed message on voice mail for today's visit only yes, phone # [phone] (home)

## 2025-06-21 ENCOUNTER — HOSPITAL ENCOUNTER (OUTPATIENT)
Dept: CT IMAGING | Facility: CLINIC | Age: 25
Discharge: HOME | End: 2025-06-21
Attending: SURGERY | Admitting: SURGERY
Payer: COMMERCIAL

## 2025-06-21 DIAGNOSIS — K35.30 ACUTE APPENDICITIS WITH LOCALIZED PERITONITIS, WITHOUT PERFORATION, ABSCESS, OR GANGRENE: ICD-10-CM

## 2025-06-21 PROCEDURE — 74177 CT ABD & PELVIS W/CONTRAST: CPT

## 2025-06-21 PROCEDURE — 250N000011 HC RX IP 250 OP 636: Performed by: SURGERY

## 2025-06-21 RX ORDER — IOPAMIDOL 755 MG/ML
86 INJECTION, SOLUTION INTRAVASCULAR ONCE
Status: COMPLETED | OUTPATIENT
Start: 2025-06-21 | End: 2025-06-21

## 2025-06-21 RX ADMIN — IOPAMIDOL 86 ML: 755 INJECTION, SOLUTION INTRAVENOUS at 08:02

## 2025-06-23 ENCOUNTER — OFFICE VISIT (OUTPATIENT)
Dept: SURGERY | Facility: CLINIC | Age: 25
End: 2025-06-23
Payer: COMMERCIAL

## 2025-06-23 ENCOUNTER — TELEPHONE (OUTPATIENT)
Dept: SURGERY | Facility: CLINIC | Age: 25
End: 2025-06-23

## 2025-06-23 VITALS
RESPIRATION RATE: 16 BRPM | WEIGHT: 171 LBS | HEIGHT: 63 IN | OXYGEN SATURATION: 98 % | SYSTOLIC BLOOD PRESSURE: 102 MMHG | DIASTOLIC BLOOD PRESSURE: 66 MMHG | BODY MASS INDEX: 30.3 KG/M2 | HEART RATE: 76 BPM

## 2025-06-23 DIAGNOSIS — K36 CHRONIC APPENDICITIS: Primary | ICD-10-CM

## 2025-06-23 PROCEDURE — 99213 OFFICE O/P EST LOW 20 MIN: CPT | Performed by: SURGERY

## 2025-06-23 NOTE — TELEPHONE ENCOUNTER
Type of surgery: LAPAROSCOPIC APPENDECTOMY   Location of surgery: [facility]  Date and time of surgery: 7/11/2025 @ 8:15 AM   Surgeon: PK THAYER MD    Pre-Op Appt Date: PATIENT TO SCHEDULE    Post-Op Appt Date: PATIENT TO SCHEDULE     Packet sent out: Yes  Pre-cert/Authorization completed:  Not Applicable  Date: 6/23/2025         LAPAROSCOPIC APPENDECTOMY         GENERAL PT INST TO HAVE H&P WITH [id number] MIN REQ PA ASSIST RMO NMS

## 2025-06-23 NOTE — LETTER
2025       Deirdre ALLY Benny  [address]   [place] [address]     RE: [id number]  : 2000    Deirdre Zapata has been scheduled for surgery on 7/10/2025 at 8:15 AM  at [facility] with Dr Tra Conley.  The hospital is located at [address] in [place].    Please check in at the Surgery reception desk at 6:15 AM . This is located in the back of the hospital on the East side, just past the Emergency Room entrance.     DO NOT EAT OR DRINK ANYTHING 8 HOURS BEFORE YOUR ARRIVAL TIME.   You may have sips of clear liquids up until 2 hours before your arrival time. If you have been advised to take your medication, please do this early in the morning with just sips of clear liquid.     Hospital regulations require an updated pre-operative examination to be completed within 30 days of the procedure. This can be done by your primary care provider. Please ask them to fax documentation to [phone]. We also recommend you bring a copy with you.     You should shower before your surgery with Hibiclens or Exidine soap.  This can be found at your local pharmacy or you can pick it up from our office for free.  Please call our office if you have any questions.     You will be required to have an Adult (friend or family member) drive you home after your surgery and arrange for an adult to stay with you until the next morning.     You will receive several calls from our staff 3-7 days prior to your scheduled procedure with further details and to answer any questions you may have.    It is sometimes necessary to adjust the surgery schedule due to emergencies and additions to the schedule.  If your surgery is affected by this, we greatly appreciate your flexibility and understanding in this matter    It is best if you call regarding post-operative questions between the hours of 8:00 am & 3:00 pm Monday-Friday, so you have access to the daytime care team that know you  best.  Prescription refills are accepted during regular office hours only.    Please do not bring any Disability or FMLA papers to the hospital.  They need to be either faxed ([phone]), mailed or hand delivered to our office by you or a family member for completion.  Please allow 14 business days to complete paperwork.        If you have questions or concerns, please contact our office at [phone].

## 2025-06-23 NOTE — LETTER
June 23, 2025          RE:   Deirdre ALLY Benny 2000      Dear Colleague,    Thank you for referring your patient, Deirdre Zapata, to [facility]. Please see a copy of my visit note below.    Surgery-[place]  Yennifer and her mother return to the office today to discuss timing of a proposed interval appendectomy. I initially met her on 5/20/2025 when she had phlegmonous appendicitis and halted and attempted appendectomy due to the inflammation and immobility of the cecum. She was successfully treated with antibiotics and states that she is feeling relatively well today. Her CT on 6/20 showed decreased inflammation of the cecum and just residual enhancement of the appendix.  I have advised that we proceed with an interval appendectomy to obviate future episodes of appendicitis.  Risks of the surgery were outlined including the need for additional port sites and open conversion. Anticipated recovery was reviewed as well. All questions were answered to the best of my ability. We will work on scheduling her in the coming weeks.        Again, thank you for allowing me to participate in the care of your patient.      Sincerely,      Tra Conley MD

## 2025-06-23 NOTE — PROGRESS NOTES
Surgery-[place] Point[place] De Oliveira and her mother return to the office today to discuss timing of a proposed interval appendectomy. I initially met her on 5/20/2025 when she had phlegmonous appendicitis and halted and attempted appendectomy due to the inflammation and immobility of the cecum. She was successfully treated with antibiotics and states that she is feeling relatively well today. Her CT on 6/20 showed decreased inflammation of the cecum and just residual enhancement of the appendix.  I have advised that we proceed with an interval appendectomy to obviate future episodes of appendicitis.  Risks of the surgery were outlined including the need for additional port sites and open conversion. Anticipated recovery was reviewed as well. All questions were answered to the best of my ability. We will work on scheduling her in the coming weeks.    Please route or send letter to:  Primary Care Provider (PCP)

## 2025-06-23 NOTE — LETTER
2025       Deirdre ALLY Benny  [address]   [place] [address]     RE: [id number]  : 2000    Deirdre Zapata has been scheduled for surgery on 7/10/2025 at 11:25 AM  at [facility] with Dr Tra Conley.  The hospital is located at [address] in [place].    Please check in at the Surgery reception desk at 9:25 AM . This is located in the back of the hospital on the East side, just past the Emergency Room entrance.     DO NOT EAT OR DRINK ANYTHING 8 HOURS BEFORE YOUR ARRIVAL TIME.   You may have sips of clear liquids up until 2 hours before your arrival time. If you have been advised to take your medication, please do this early in the morning with just sips of clear liquid.     Hospital regulations require an updated pre-operative examination to be completed within 30 days of the procedure. This can be done by your primary care provider. Please ask them to fax documentation to [phone]. We also recommend you bring a copy with you.     You should shower before your surgery with Hibiclens or Exidine soap.  This can be found at your local pharmacy or you can pick it up from our office for free.  Please call our office if you have any questions.     You will be required to have an Adult (friend or family member) drive you home after your surgery and arrange for an adult to stay with you until the next morning.     You will receive several calls from our staff 3-7 days prior to your scheduled procedure with further details and to answer any questions you may have.    It is sometimes necessary to adjust the surgery schedule due to emergencies and additions to the schedule.  If your surgery is affected by this, we greatly appreciate your flexibility and understanding in this matter    It is best if you call regarding post-operative questions between the hours of 8:00 am & 3:00 pm Monday-Friday, so you have access to the daytime care team that know you  best.  Prescription refills are accepted during regular office hours only.    Please do not bring any Disability or FMLA papers to the hospital.  They need to be either faxed ([phone]), mailed or hand delivered to our office by you or a family member for completion.  Please allow 14 business days to complete paperwork.        If you have questions or concerns, please contact our office at [phone].

## 2025-06-26 ENCOUNTER — OFFICE VISIT (OUTPATIENT)
Dept: FAMILY MEDICINE | Facility: CLINIC | Age: 25
End: 2025-06-26

## 2025-06-26 VITALS
TEMPERATURE: 98.3 F | DIASTOLIC BLOOD PRESSURE: 72 MMHG | BODY MASS INDEX: 30.3 KG/M2 | OXYGEN SATURATION: 98 % | SYSTOLIC BLOOD PRESSURE: 116 MMHG | WEIGHT: 171 LBS | HEART RATE: 81 BPM | HEIGHT: 63 IN

## 2025-06-26 DIAGNOSIS — Z01.818 PRE-OP EXAM: ICD-10-CM

## 2025-06-26 DIAGNOSIS — K35.30 ACUTE APPENDICITIS WITH LOCALIZED PERITONITIS, WITHOUT PERFORATION, ABSCESS, OR GANGRENE: Primary | ICD-10-CM

## 2025-06-26 LAB
% GRANULOCYTES: 4.2 %
HCT VFR BLD AUTO: 41.6 % (ref 35–47)
HEMOGLOBIN: 13.2 G/DL (ref 11.7–15.7)
LYMPHOCYTES NFR BLD AUTO: 36.5 %
MCH RBC QN AUTO: 27.3 PG (ref 26–33)
MCHC RBC AUTO-ENTMCNC: 31.7 G/DL (ref 31–36)
MCV RBC AUTO: 86.2 FL (ref 78–100)
MONOCYTES NFR BLD AUTO: 11.7 %
PLATELET COUNT - QUEST: 158 10^9/L (ref 150–375)
RBC # BLD AUTO: 4.83 10*12/L (ref 3.8–5.2)
WBC # BLD AUTO: 8.1 10*9/L (ref 4–11)

## 2025-06-26 NOTE — PROGRESS NOTES
Preoperative Evaluation  [facility]  [address] [place] [address]  [address]  [facility] [address]  Phone: [phone]  Fax: [phone]  Primary Provider: RUTH PRETTY MD  Pre-op Performing Provider: Tra Vieira PA-C  Jun 26, 2025 6/26/2025   Surgical Information   What procedure is being done? APPENDECTOMY, LAPAROSCOPIC   Facility or Hospital where procedure/surgery will be performed:  [facility]   Who is doing the procedure / surgery? Dr. Gore   Date of surgery / procedure: 07/03/25   Time of surgery / procedure: AM   Where do you plan to recover after surgery? at home with family     Fax number for surgical facility: Note does not need to be faxed, will be available electronically in Epic.    Assessment & Plan     The proposed surgical procedure is considered INTERMEDIATE risk.    Acute appendicitis with localized peritonitis, without perforation, abscess, or gangrene  Proceed with surgery at surgeon's discretion.    - VENOUS COLLECTION  - HEMOGRAM PLATELET DIFF (BFP)    Pre-op exam    - VENOUS COLLECTION  - HEMOGRAM PLATELET DIFF (BFP)        Risks and Recommendations  The patient has the following additional risks and recommendations for perioperative complications:   - No identified additional risk factors other than previously addressed    Antiplatelet or Anticoagulation Medication Instructions   - We reviewed the medication list and the patient is not on an antiplatelet or anticoagulation medications.    Additional Medication Instructions  Thyroid meds before, all others after    Recommendation  Approval given to proceed with proposed procedure, without further diagnostic evaluation.        Chris De Oliveira is a 25 year old, presenting for the following:  Pre-Op Exam        HPI: appendectomy, failed removal ~1 month ago      1. No - Have you ever had a heart attack or stroke?  2. No - Have you ever had surgery on your heart or blood vessels, such as a stent,  coronary (heart) bypass, or surgery on an artery in the head, neck, heart, or legs?  3. No - Do you have chest pain when you are physically active?  4. No - Do you have a history of heart failure?  5. No - Do you currently have a cold, bronchitis, or symptoms of other respiratory (head and chest) infections?  6. No - Do you have a cough, shortness of breath, or wheezing?  7. No - Do you or anyone in your family have a history of blood clots?  8. No - Do you or anyone in your family have a serious bleeding problem, such as long-lasting bleeding after surgeries or cuts?  9. Yes - Have you ever had anemia or been told to take iron pills? Anemia in the past  10. No - Have you had any abnormal blood loss such as black, tarry or bloody stools, or abnormal vaginal bleeding?  11. No - Have you ever had a blood transfusion?  12. Yes - Are you willing to have a blood transfusion if it is medically needed before, during, or after your surgery?  13. No - Have you or anyone in your family ever had problems with anesthesia (sedation for surgery)?  14. No - Do you have sleep apnea, excessive snoring, or daytime drowsiness?   15. No - Do you have any artifical heart valves or other implanted medical devices, such as a pacemaker, defibrillator, or continuous glucose monitor?  16. No - Do you have any artifical joints?  17. No - Are you allergic to latex?  18. No - Is there any chance that you may be pregnant?    Advance Care Planning        Preoperative Review of    reviewed - controlled substances prescribed by other outside provider(s).      Status of Chronic Conditions:  See problem list for active medical problems.  Problems all longstanding and stable, except as noted/documented.  See ROS for pertinent symptoms related to these conditions.    Patient Active Problem List    Diagnosis Date Noted    Acute appendicitis with localized peritonitis, without perforation, abscess, or gangrene 05/20/2025     Priority: Medium     PCOS (polycystic ovarian syndrome) 03/12/2022     Priority: Medium    Celiac disease 05/23/2019     Priority: Medium     Formatting of this note might be different from the original.  see blood tests and colonoscopy/endoscopy      Thyroid dysfunction 12/21/2017     Priority: Medium     Formatting of this note might be different from the original.  retest the TSH in 2 months, and if still normal, then 6-12 months later.      Mild intermittent asthma without complication 09/21/2016     Priority: Medium    Irregular menses 07/24/2015     Priority: Medium    Iron deficiency anemia due to chronic blood loss 10/08/2014     Priority: Medium    Vitamin D deficiency 10/08/2014     Priority: Medium    Allergic rhinitis due to animal hair and dander 06/16/2010     Priority: Medium    ACP (advance care planning) 11/17/2021     Priority: Low      Past Medical History:   Diagnosis Date    Appendicitis     Asthma     Depression     JERRY (generalized anxiety disorder)     Hypothyroidism     PCOS (polycystic ovarian syndrome)      Past Surgical History:   Procedure Laterality Date    DENTAL SURGERY  2017    HIP SURGERY      L hip scope    LAPAROSCOPIC APPENDECTOMY N/A 05/20/2025    Procedure: Diagnostic Laparoscopy;  Surgeon: Tra Gore MD;  Location:  OR    PODIATRY/FOOT & ANKLE SURGERY REFERRAL Bilateral 2014    Dr. Tompkins    TONSILLECTOMY, ADENOIDECTOMY, COMBINED  2002     Current Outpatient Medications   Medication Sig Dispense Refill    albuterol (PROAIR HFA/PROVENTIL HFA/VENTOLIN HFA) 108 (90 Base) MCG/ACT inhaler Inhale 2 puffs into the lungs every 4 hours as needed for shortness of breath, wheezing or cough. 18 g 1    cetirizine (ZYRTEC) 5 MG tablet Take 10 mg by mouth daily.      Cyanocobalamin 1000 MCG CAPS Take 1 capsule by mouth every 24 hours.      ferrous sulfate (FEROSUL) 325 (65 Fe) MG tablet Take 325 mg by mouth daily.      FLUoxetine (PROZAC) 40 MG capsule Take 1 capsule (40 mg) by mouth daily. 90  "capsule 0    fluticasone-salmeterol (AIRDUO RESPICLICK) 113-14 MCG/ACT inhaler Inhale 1 puff into the lungs 2 times daily. 1 each 1    folic acid (FOLVITE) 1 MG tablet Take 1,000 mcg by mouth daily.      levothyroxine (SYNTHROID/LEVOTHROID) 25 MCG tablet Take 25 mcg by mouth every morning (before breakfast).      Probiotic Product (PROBIOTIC-10 PO) Take 1 capsule by mouth daily. Gluten free. Takes on and off.      triamcinolone (NASACORT) 55 MCG/ACT nasal aerosol Spray 2 sprays into both nostrils daily as needed (allergy symptoms).      VESTURA 3-0.02 MG tablet Take 1 tablet by mouth daily         Allergies   Allergen Reactions    Other Environmental Allergy Hives and Itching     POLLEN, TREES    Cats     Gluten Meal     Morphine Nausea and Vomiting    Dog Epithelium (Canis Lupus Familiaris) Hives and Itching        Social History     Tobacco Use    Smoking status: Never     Passive exposure: Never    Smokeless tobacco: Never   Substance Use Topics    Alcohol use: Yes     Alcohol/week: 3.0 standard drinks of alcohol     Types: 3 Standard drinks or equivalent per week     Comment: weekends       History   Drug Use Unknown             Review of Systems  Constitutional, neuro, ENT, endocrine, pulmonary, cardiac, gastrointestinal, genitourinary, musculoskeletal, integument and psychiatric systems are negative, except as otherwise noted.    Objective    /72 (BP Location: Right arm, Patient Position: Sitting, Cuff Size: Adult Large)   Pulse 81   Temp 98.3  F (36.8  C) (Temporal)   Ht 1.607 m (5' 3.25\")   Wt 77.6 kg (171 lb)   LMP 06/16/2025 (Exact Date)   SpO2 98%   BMI 30.05 kg/m     Estimated body mass index is 30.05 kg/m  as calculated from the following:    Height as of this encounter: 1.607 m (5' 3.25\").    Weight as of this encounter: 77.6 kg (171 lb).  Physical Exam  GENERAL: alert and no distress  EYES: Eyes grossly normal to inspection, PERRL and conjunctivae and sclerae normal  HENT: ear canals and " TM's normal, nose and mouth without ulcers or lesions  NECK: no adenopathy, no asymmetry, masses, or scars  RESP: lungs clear to auscultation - no rales, rhonchi or wheezes  CV: regular rate and rhythm, normal S1 S2, no S3 or S4, no murmur, click or rub, no peripheral edema  ABDOMEN: soft, nontender, no hepatosplenomegaly, no masses and bowel sounds normal  MS: no gross musculoskeletal defects noted, no edema  NEURO: Normal strength and tone, mentation intact and speech normal  PSYCH: mentation appears normal, affect normal/bright    Recent Labs   Lab Test 05/21/25  0737 05/19/25  2229 05/19/25  1816   HGB 9.7*  --  11.4*     --  362   NA  --  138  --    POTASSIUM  --  4.5  --    CR  --  0.87  --         Diagnostics  Recent Results (from the past 48 hours)   HEMOGRAM PLATELET DIFF (BFP)    Collection Time: 06/26/25  4:17 PM   Result Value Ref Range    WBC 8.1 4.0 - 11 10*9/L    RBC Count 4.83 3.8 - 5.2 10*12/L    Hemoglobin 13.2 11.7 - 15.7 g/dL    Hematocrit 41.6 35.0 - 47.0 %    MCV 86.2 78 - 100 fL    MCH 27.3 26 - 33 pg    MCHC 31.7 31 - 36 g/dL    Platelet Count 158 150 - 375 10^9/L    % Granulocytes 4.2 %    % Lymphocytes 36.5 %    % Monocytes 11.7 %      No EKG required, no history of coronary heart disease, significant arrhythmia, peripheral arterial disease or other structural heart disease.    Revised Cardiac Risk Index (RCRI)  The patient has the following serious cardiovascular risks for perioperative complications:   - No serious cardiac risks = 0 points     RCRI Interpretation: 0 points: Class I (very low risk - 0.4% complication rate)         Signed Electronically by: Tra Vieira PA-C  A copy of this evaluation report is provided to the requesting physician.

## 2025-06-26 NOTE — H&P (VIEW-ONLY)
Preoperative Evaluation  [facility]  [address] [place] [address]  [address]  [facility] [address]  Phone: [phone]  Fax: [phone]  Primary Provider: RUTH PRETTY MD  Pre-op Performing Provider: Tra Vieira PA-C  Jun 26, 2025 6/26/2025   Surgical Information   What procedure is being done? APPENDECTOMY, LAPAROSCOPIC   Facility or Hospital where procedure/surgery will be performed:  [facility]   Who is doing the procedure / surgery? Dr. Gore   Date of surgery / procedure: 07/03/25   Time of surgery / procedure: AM   Where do you plan to recover after surgery? at home with family     Fax number for surgical facility: Note does not need to be faxed, will be available electronically in Epic.    Assessment & Plan     The proposed surgical procedure is considered INTERMEDIATE risk.    Acute appendicitis with localized peritonitis, without perforation, abscess, or gangrene  Proceed with surgery at surgeon's discretion.    - VENOUS COLLECTION  - HEMOGRAM PLATELET DIFF (BFP)    Pre-op exam    - VENOUS COLLECTION  - HEMOGRAM PLATELET DIFF (BFP)        Risks and Recommendations  The patient has the following additional risks and recommendations for perioperative complications:   - No identified additional risk factors other than previously addressed    Antiplatelet or Anticoagulation Medication Instructions   - We reviewed the medication list and the patient is not on an antiplatelet or anticoagulation medications.    Additional Medication Instructions  Thyroid meds before, all others after    Recommendation  Approval given to proceed with proposed procedure, without further diagnostic evaluation.        Chris De Oliveira is a 25 year old, presenting for the following:  Pre-Op Exam        HPI: appendectomy, failed removal ~1 month ago      1. No - Have you ever had a heart attack or stroke?  2. No - Have you ever had surgery on your heart or blood vessels, such as a stent,  coronary (heart) bypass, or surgery on an artery in the head, neck, heart, or legs?  3. No - Do you have chest pain when you are physically active?  4. No - Do you have a history of heart failure?  5. No - Do you currently have a cold, bronchitis, or symptoms of other respiratory (head and chest) infections?  6. No - Do you have a cough, shortness of breath, or wheezing?  7. No - Do you or anyone in your family have a history of blood clots?  8. No - Do you or anyone in your family have a serious bleeding problem, such as long-lasting bleeding after surgeries or cuts?  9. Yes - Have you ever had anemia or been told to take iron pills? Anemia in the past  10. No - Have you had any abnormal blood loss such as black, tarry or bloody stools, or abnormal vaginal bleeding?  11. No - Have you ever had a blood transfusion?  12. Yes - Are you willing to have a blood transfusion if it is medically needed before, during, or after your surgery?  13. No - Have you or anyone in your family ever had problems with anesthesia (sedation for surgery)?  14. No - Do you have sleep apnea, excessive snoring, or daytime drowsiness?   15. No - Do you have any artifical heart valves or other implanted medical devices, such as a pacemaker, defibrillator, or continuous glucose monitor?  16. No - Do you have any artifical joints?  17. No - Are you allergic to latex?  18. No - Is there any chance that you may be pregnant?    Advance Care Planning        Preoperative Review of    reviewed - controlled substances prescribed by other outside provider(s).      Status of Chronic Conditions:  See problem list for active medical problems.  Problems all longstanding and stable, except as noted/documented.  See ROS for pertinent symptoms related to these conditions.    Patient Active Problem List    Diagnosis Date Noted    Acute appendicitis with localized peritonitis, without perforation, abscess, or gangrene 05/20/2025     Priority: Medium     PCOS (polycystic ovarian syndrome) 03/12/2022     Priority: Medium    Celiac disease 05/23/2019     Priority: Medium     Formatting of this note might be different from the original.  see blood tests and colonoscopy/endoscopy      Thyroid dysfunction 12/21/2017     Priority: Medium     Formatting of this note might be different from the original.  retest the TSH in 2 months, and if still normal, then 6-12 months later.      Mild intermittent asthma without complication 09/21/2016     Priority: Medium    Irregular menses 07/24/2015     Priority: Medium    Iron deficiency anemia due to chronic blood loss 10/08/2014     Priority: Medium    Vitamin D deficiency 10/08/2014     Priority: Medium    Allergic rhinitis due to animal hair and dander 06/16/2010     Priority: Medium    ACP (advance care planning) 11/17/2021     Priority: Low      Past Medical History:   Diagnosis Date    Appendicitis     Asthma     Depression     JERRY (generalized anxiety disorder)     Hypothyroidism     PCOS (polycystic ovarian syndrome)      Past Surgical History:   Procedure Laterality Date    DENTAL SURGERY  2017    HIP SURGERY      L hip scope    LAPAROSCOPIC APPENDECTOMY N/A 05/20/2025    Procedure: Diagnostic Laparoscopy;  Surgeon: Tra Gore MD;  Location:  OR    PODIATRY/FOOT & ANKLE SURGERY REFERRAL Bilateral 2014    Dr. Tompkins    TONSILLECTOMY, ADENOIDECTOMY, COMBINED  2002     Current Outpatient Medications   Medication Sig Dispense Refill    albuterol (PROAIR HFA/PROVENTIL HFA/VENTOLIN HFA) 108 (90 Base) MCG/ACT inhaler Inhale 2 puffs into the lungs every 4 hours as needed for shortness of breath, wheezing or cough. 18 g 1    cetirizine (ZYRTEC) 5 MG tablet Take 10 mg by mouth daily.      Cyanocobalamin 1000 MCG CAPS Take 1 capsule by mouth every 24 hours.      ferrous sulfate (FEROSUL) 325 (65 Fe) MG tablet Take 325 mg by mouth daily.      FLUoxetine (PROZAC) 40 MG capsule Take 1 capsule (40 mg) by mouth daily. 90  "capsule 0    fluticasone-salmeterol (AIRDUO RESPICLICK) 113-14 MCG/ACT inhaler Inhale 1 puff into the lungs 2 times daily. 1 each 1    folic acid (FOLVITE) 1 MG tablet Take 1,000 mcg by mouth daily.      levothyroxine (SYNTHROID/LEVOTHROID) 25 MCG tablet Take 25 mcg by mouth every morning (before breakfast).      Probiotic Product (PROBIOTIC-10 PO) Take 1 capsule by mouth daily. Gluten free. Takes on and off.      triamcinolone (NASACORT) 55 MCG/ACT nasal aerosol Spray 2 sprays into both nostrils daily as needed (allergy symptoms).      VESTURA 3-0.02 MG tablet Take 1 tablet by mouth daily         Allergies   Allergen Reactions    Other Environmental Allergy Hives and Itching     POLLEN, TREES    Cats     Gluten Meal     Morphine Nausea and Vomiting    Dog Epithelium (Canis Lupus Familiaris) Hives and Itching        Social History     Tobacco Use    Smoking status: Never     Passive exposure: Never    Smokeless tobacco: Never   Substance Use Topics    Alcohol use: Yes     Alcohol/week: 3.0 standard drinks of alcohol     Types: 3 Standard drinks or equivalent per week     Comment: weekends       History   Drug Use Unknown             Review of Systems  Constitutional, neuro, ENT, endocrine, pulmonary, cardiac, gastrointestinal, genitourinary, musculoskeletal, integument and psychiatric systems are negative, except as otherwise noted.    Objective    /72 (BP Location: Right arm, Patient Position: Sitting, Cuff Size: Adult Large)   Pulse 81   Temp 98.3  F (36.8  C) (Temporal)   Ht 1.607 m (5' 3.25\")   Wt 77.6 kg (171 lb)   LMP 06/16/2025 (Exact Date)   SpO2 98%   BMI 30.05 kg/m     Estimated body mass index is 30.05 kg/m  as calculated from the following:    Height as of this encounter: 1.607 m (5' 3.25\").    Weight as of this encounter: 77.6 kg (171 lb).  Physical Exam  GENERAL: alert and no distress  EYES: Eyes grossly normal to inspection, PERRL and conjunctivae and sclerae normal  HENT: ear canals and " TM's normal, nose and mouth without ulcers or lesions  NECK: no adenopathy, no asymmetry, masses, or scars  RESP: lungs clear to auscultation - no rales, rhonchi or wheezes  CV: regular rate and rhythm, normal S1 S2, no S3 or S4, no murmur, click or rub, no peripheral edema  ABDOMEN: soft, nontender, no hepatosplenomegaly, no masses and bowel sounds normal  MS: no gross musculoskeletal defects noted, no edema  NEURO: Normal strength and tone, mentation intact and speech normal  PSYCH: mentation appears normal, affect normal/bright    Recent Labs   Lab Test 05/21/25  0737 05/19/25  2229 05/19/25  1816   HGB 9.7*  --  11.4*     --  362   NA  --  138  --    POTASSIUM  --  4.5  --    CR  --  0.87  --         Diagnostics  Recent Results (from the past 48 hours)   HEMOGRAM PLATELET DIFF (BFP)    Collection Time: 06/26/25  4:17 PM   Result Value Ref Range    WBC 8.1 4.0 - 11 10*9/L    RBC Count 4.83 3.8 - 5.2 10*12/L    Hemoglobin 13.2 11.7 - 15.7 g/dL    Hematocrit 41.6 35.0 - 47.0 %    MCV 86.2 78 - 100 fL    MCH 27.3 26 - 33 pg    MCHC 31.7 31 - 36 g/dL    Platelet Count 158 150 - 375 10^9/L    % Granulocytes 4.2 %    % Lymphocytes 36.5 %    % Monocytes 11.7 %      No EKG required, no history of coronary heart disease, significant arrhythmia, peripheral arterial disease or other structural heart disease.    Revised Cardiac Risk Index (RCRI)  The patient has the following serious cardiovascular risks for perioperative complications:   - No serious cardiac risks = 0 points     RCRI Interpretation: 0 points: Class I (very low risk - 0.4% complication rate)         Signed Electronically by: Tra Vieira PA-C  A copy of this evaluation report is provided to the requesting physician.

## 2025-07-03 ENCOUNTER — HOSPITAL ENCOUNTER (OUTPATIENT)
Facility: CLINIC | Age: 25
Discharge: HOME | End: 2025-07-03
Attending: SURGERY | Admitting: SURGERY
Payer: COMMERCIAL

## 2025-07-03 ENCOUNTER — ANESTHESIA (OUTPATIENT)
Dept: SURGERY | Facility: CLINIC | Age: 25
End: 2025-07-03
Payer: COMMERCIAL

## 2025-07-03 ENCOUNTER — ANESTHESIA EVENT (OUTPATIENT)
Dept: SURGERY | Facility: CLINIC | Age: 25
End: 2025-07-03
Payer: COMMERCIAL

## 2025-07-03 VITALS
TEMPERATURE: 97 F | HEART RATE: 78 BPM | RESPIRATION RATE: 18 BRPM | DIASTOLIC BLOOD PRESSURE: 85 MMHG | OXYGEN SATURATION: 97 % | HEIGHT: 63 IN | WEIGHT: 172.1 LBS | BODY MASS INDEX: 30.49 KG/M2 | SYSTOLIC BLOOD PRESSURE: 120 MMHG

## 2025-07-03 DIAGNOSIS — K36 CHRONIC APPENDICITIS: ICD-10-CM

## 2025-07-03 PROCEDURE — 370N000017 HC ANESTHESIA TECHNICAL FEE, PER MIN: Performed by: SURGERY

## 2025-07-03 PROCEDURE — 250N000011 HC RX IP 250 OP 636: Performed by: SURGERY

## 2025-07-03 PROCEDURE — 710N000012 HC RECOVERY PHASE 2, PER MINUTE: Performed by: SURGERY

## 2025-07-03 PROCEDURE — 250N000011 HC RX IP 250 OP 636

## 2025-07-03 PROCEDURE — 88304 TISSUE EXAM BY PATHOLOGIST: CPT | Mod: TC | Performed by: SURGERY

## 2025-07-03 PROCEDURE — 258N000003 HC RX IP 258 OP 636

## 2025-07-03 PROCEDURE — 250N000011 HC RX IP 250 OP 636: Performed by: ANESTHESIOLOGY

## 2025-07-03 PROCEDURE — 88312 SPECIAL STAINS GROUP 1: CPT | Mod: 26 | Performed by: PATHOLOGY

## 2025-07-03 PROCEDURE — 272N000001 HC OR GENERAL SUPPLY STERILE: Performed by: SURGERY

## 2025-07-03 PROCEDURE — 710N000009 HC RECOVERY PHASE 1, LEVEL 1, PER MIN: Performed by: SURGERY

## 2025-07-03 PROCEDURE — 250N000013 HC RX MED GY IP 250 OP 250 PS 637: Performed by: ANESTHESIOLOGY

## 2025-07-03 PROCEDURE — 88341 IMHCHEM/IMCYTCHM EA ADD ANTB: CPT | Mod: 26 | Performed by: PATHOLOGY

## 2025-07-03 PROCEDURE — 88304 TISSUE EXAM BY PATHOLOGIST: CPT | Mod: 26 | Performed by: PATHOLOGY

## 2025-07-03 PROCEDURE — 360N000076 HC SURGERY LEVEL 3, PER MIN: Performed by: SURGERY

## 2025-07-03 PROCEDURE — 44970 LAPAROSCOPY APPENDECTOMY: CPT | Performed by: SURGERY

## 2025-07-03 PROCEDURE — 999N000141 HC STATISTIC PRE-PROCEDURE NURSING ASSESSMENT: Performed by: SURGERY

## 2025-07-03 PROCEDURE — 44970 LAPAROSCOPY APPENDECTOMY: CPT | Mod: AS | Performed by: PHYSICIAN ASSISTANT

## 2025-07-03 PROCEDURE — 258N000001 HC RX 258: Performed by: SURGERY

## 2025-07-03 PROCEDURE — 88342 IMHCHEM/IMCYTCHM 1ST ANTB: CPT | Mod: 26 | Performed by: PATHOLOGY

## 2025-07-03 PROCEDURE — 250N000009 HC RX 250

## 2025-07-03 RX ORDER — HYDROMORPHONE HCL IN WATER/PF 6 MG/30 ML
0.4 PATIENT CONTROLLED ANALGESIA SYRINGE INTRAVENOUS EVERY 5 MIN PRN
Status: DISCONTINUED | OUTPATIENT
Start: 2025-07-03 | End: 2025-07-03 | Stop reason: HOSPADM

## 2025-07-03 RX ORDER — DEXAMETHASONE SODIUM PHOSPHATE 4 MG/ML
4 INJECTION, SOLUTION INTRA-ARTICULAR; INTRALESIONAL; INTRAMUSCULAR; INTRAVENOUS; SOFT TISSUE
Status: DISCONTINUED | OUTPATIENT
Start: 2025-07-03 | End: 2025-07-03 | Stop reason: HOSPADM

## 2025-07-03 RX ORDER — FENTANYL CITRATE 50 UG/ML
INJECTION, SOLUTION INTRAMUSCULAR; INTRAVENOUS PRN
Status: DISCONTINUED | OUTPATIENT
Start: 2025-07-03 | End: 2025-07-03

## 2025-07-03 RX ORDER — PROPOFOL 10 MG/ML
INJECTION, EMULSION INTRAVENOUS CONTINUOUS PRN
Status: DISCONTINUED | OUTPATIENT
Start: 2025-07-03 | End: 2025-07-03

## 2025-07-03 RX ORDER — NALOXONE HYDROCHLORIDE 0.4 MG/ML
0.1 INJECTION, SOLUTION INTRAMUSCULAR; INTRAVENOUS; SUBCUTANEOUS
Status: DISCONTINUED | OUTPATIENT
Start: 2025-07-03 | End: 2025-07-03 | Stop reason: HOSPADM

## 2025-07-03 RX ORDER — ONDANSETRON 2 MG/ML
4 INJECTION INTRAMUSCULAR; INTRAVENOUS EVERY 30 MIN PRN
Status: DISCONTINUED | OUTPATIENT
Start: 2025-07-03 | End: 2025-07-03 | Stop reason: HOSPADM

## 2025-07-03 RX ORDER — BUPIVACAINE HYDROCHLORIDE 5 MG/ML
INJECTION, SOLUTION EPIDURAL; INTRACAUDAL; PERINEURAL PRN
Status: DISCONTINUED | OUTPATIENT
Start: 2025-07-03 | End: 2025-07-03 | Stop reason: HOSPADM

## 2025-07-03 RX ORDER — ACETAMINOPHEN 325 MG/1
975 TABLET ORAL
Status: DISCONTINUED | OUTPATIENT
Start: 2025-07-03 | End: 2025-07-03 | Stop reason: HOSPADM

## 2025-07-03 RX ORDER — FENTANYL CITRATE 50 UG/ML
50 INJECTION, SOLUTION INTRAMUSCULAR; INTRAVENOUS EVERY 5 MIN PRN
Status: DISCONTINUED | OUTPATIENT
Start: 2025-07-03 | End: 2025-07-03 | Stop reason: HOSPADM

## 2025-07-03 RX ORDER — KETOROLAC TROMETHAMINE 15 MG/ML
15 INJECTION, SOLUTION INTRAMUSCULAR; INTRAVENOUS
Status: DISCONTINUED | OUTPATIENT
Start: 2025-07-03 | End: 2025-07-03 | Stop reason: HOSPADM

## 2025-07-03 RX ORDER — SODIUM CHLORIDE, SODIUM LACTATE, POTASSIUM CHLORIDE, CALCIUM CHLORIDE 600; 310; 30; 20 MG/100ML; MG/100ML; MG/100ML; MG/100ML
INJECTION, SOLUTION INTRAVENOUS CONTINUOUS
Status: DISCONTINUED | OUTPATIENT
Start: 2025-07-03 | End: 2025-07-03 | Stop reason: HOSPADM

## 2025-07-03 RX ORDER — DEXAMETHASONE SODIUM PHOSPHATE 4 MG/ML
INJECTION, SOLUTION INTRA-ARTICULAR; INTRALESIONAL; INTRAMUSCULAR; INTRAVENOUS; SOFT TISSUE PRN
Status: DISCONTINUED | OUTPATIENT
Start: 2025-07-03 | End: 2025-07-03

## 2025-07-03 RX ORDER — ALBUTEROL SULFATE 0.83 MG/ML
2.5 SOLUTION RESPIRATORY (INHALATION) EVERY 4 HOURS PRN
Status: DISCONTINUED | OUTPATIENT
Start: 2025-07-03 | End: 2025-07-03 | Stop reason: HOSPADM

## 2025-07-03 RX ORDER — ACETAMINOPHEN 325 MG/1
975 TABLET ORAL ONCE
Status: COMPLETED | OUTPATIENT
Start: 2025-07-03 | End: 2025-07-03

## 2025-07-03 RX ORDER — SODIUM CHLORIDE, SODIUM LACTATE, POTASSIUM CHLORIDE, CALCIUM CHLORIDE 600; 310; 30; 20 MG/100ML; MG/100ML; MG/100ML; MG/100ML
INJECTION, SOLUTION INTRAVENOUS CONTINUOUS PRN
Status: DISCONTINUED | OUTPATIENT
Start: 2025-07-03 | End: 2025-07-03

## 2025-07-03 RX ORDER — FENTANYL CITRATE 50 UG/ML
25 INJECTION, SOLUTION INTRAMUSCULAR; INTRAVENOUS
Status: DISCONTINUED | OUTPATIENT
Start: 2025-07-03 | End: 2025-07-03 | Stop reason: HOSPADM

## 2025-07-03 RX ORDER — KETOROLAC TROMETHAMINE 30 MG/ML
INJECTION, SOLUTION INTRAMUSCULAR; INTRAVENOUS PRN
Status: DISCONTINUED | OUTPATIENT
Start: 2025-07-03 | End: 2025-07-03

## 2025-07-03 RX ORDER — ONDANSETRON 4 MG/1
4 TABLET, ORALLY DISINTEGRATING ORAL EVERY 30 MIN PRN
Status: DISCONTINUED | OUTPATIENT
Start: 2025-07-03 | End: 2025-07-03 | Stop reason: HOSPADM

## 2025-07-03 RX ORDER — FENTANYL CITRATE 50 UG/ML
25 INJECTION, SOLUTION INTRAMUSCULAR; INTRAVENOUS EVERY 5 MIN PRN
Status: DISCONTINUED | OUTPATIENT
Start: 2025-07-03 | End: 2025-07-03 | Stop reason: HOSPADM

## 2025-07-03 RX ORDER — LIDOCAINE HYDROCHLORIDE 20 MG/ML
INJECTION, SOLUTION INFILTRATION; PERINEURAL PRN
Status: DISCONTINUED | OUTPATIENT
Start: 2025-07-03 | End: 2025-07-03

## 2025-07-03 RX ORDER — CEFAZOLIN SODIUM/WATER 2 G/20 ML
2 SYRINGE (ML) INTRAVENOUS
Status: COMPLETED | OUTPATIENT
Start: 2025-07-03 | End: 2025-07-03

## 2025-07-03 RX ORDER — ONDANSETRON 2 MG/ML
INJECTION INTRAMUSCULAR; INTRAVENOUS PRN
Status: DISCONTINUED | OUTPATIENT
Start: 2025-07-03 | End: 2025-07-03

## 2025-07-03 RX ORDER — HYDROMORPHONE HCL IN WATER/PF 6 MG/30 ML
0.2 PATIENT CONTROLLED ANALGESIA SYRINGE INTRAVENOUS EVERY 5 MIN PRN
Status: DISCONTINUED | OUTPATIENT
Start: 2025-07-03 | End: 2025-07-03 | Stop reason: HOSPADM

## 2025-07-03 RX ORDER — GLYCOPYRROLATE 0.2 MG/ML
INJECTION, SOLUTION INTRAMUSCULAR; INTRAVENOUS PRN
Status: DISCONTINUED | OUTPATIENT
Start: 2025-07-03 | End: 2025-07-03

## 2025-07-03 RX ORDER — LIDOCAINE 40 MG/G
CREAM TOPICAL
Status: DISCONTINUED | OUTPATIENT
Start: 2025-07-03 | End: 2025-07-03 | Stop reason: HOSPADM

## 2025-07-03 RX ORDER — CEFAZOLIN SODIUM/WATER 2 G/20 ML
2 SYRINGE (ML) INTRAVENOUS SEE ADMIN INSTRUCTIONS
Status: DISCONTINUED | OUTPATIENT
Start: 2025-07-03 | End: 2025-07-03 | Stop reason: HOSPADM

## 2025-07-03 RX ORDER — OXYCODONE HYDROCHLORIDE 5 MG/1
5 TABLET ORAL EVERY 4 HOURS PRN
Status: DISCONTINUED | OUTPATIENT
Start: 2025-07-03 | End: 2025-07-03 | Stop reason: HOSPADM

## 2025-07-03 RX ORDER — PROPOFOL 10 MG/ML
INJECTION, EMULSION INTRAVENOUS PRN
Status: DISCONTINUED | OUTPATIENT
Start: 2025-07-03 | End: 2025-07-03

## 2025-07-03 RX ORDER — HYDROCODONE BITARTRATE AND ACETAMINOPHEN 5; 325 MG/1; MG/1
1 TABLET ORAL
Status: DISCONTINUED | OUTPATIENT
Start: 2025-07-03 | End: 2025-07-03 | Stop reason: HOSPADM

## 2025-07-03 RX ORDER — HYDROCODONE BITARTRATE AND ACETAMINOPHEN 5; 325 MG/1; MG/1
1-2 TABLET ORAL EVERY 4 HOURS PRN
Qty: 10 TABLET | Refills: 0 | Status: SHIPPED | OUTPATIENT
Start: 2025-07-03 | End: 2025-07-03

## 2025-07-03 RX ADMIN — ONDANSETRON 4 MG: 2 INJECTION INTRAMUSCULAR; INTRAVENOUS at 09:16

## 2025-07-03 RX ADMIN — ACETAMINOPHEN 975 MG: 325 TABLET ORAL at 07:10

## 2025-07-03 RX ADMIN — SODIUM CHLORIDE, SODIUM LACTATE, POTASSIUM CHLORIDE, AND CALCIUM CHLORIDE: .6; .31; .03; .02 INJECTION, SOLUTION INTRAVENOUS at 08:27

## 2025-07-03 RX ADMIN — ROCURONIUM BROMIDE 50 MG: 50 INJECTION, SOLUTION INTRAVENOUS at 08:33

## 2025-07-03 RX ADMIN — FENTANYL CITRATE 100 MCG: 50 INJECTION INTRAMUSCULAR; INTRAVENOUS at 08:33

## 2025-07-03 RX ADMIN — SUGAMMADEX 200 MG: 100 INJECTION, SOLUTION INTRAVENOUS at 09:26

## 2025-07-03 RX ADMIN — PROPOFOL 200 MCG/KG/MIN: 10 INJECTION, EMULSION INTRAVENOUS at 08:38

## 2025-07-03 RX ADMIN — GLYCOPYRROLATE 0.2 MG: 0.2 INJECTION, SOLUTION INTRAMUSCULAR; INTRAVENOUS at 08:44

## 2025-07-03 RX ADMIN — MIDAZOLAM 2 MG: 1 INJECTION INTRAMUSCULAR; INTRAVENOUS at 08:27

## 2025-07-03 RX ADMIN — OXYCODONE HYDROCHLORIDE 5 MG: 5 TABLET ORAL at 10:24

## 2025-07-03 RX ADMIN — KETOROLAC TROMETHAMINE 15 MG: 30 INJECTION, SOLUTION INTRAMUSCULAR at 09:16

## 2025-07-03 RX ADMIN — DEXMEDETOMIDINE HYDROCHLORIDE 8 MCG: 100 INJECTION, SOLUTION INTRAVENOUS at 08:44

## 2025-07-03 RX ADMIN — HYDROMORPHONE HYDROCHLORIDE 0.5 MG: 1 INJECTION, SOLUTION INTRAMUSCULAR; INTRAVENOUS; SUBCUTANEOUS at 09:05

## 2025-07-03 RX ADMIN — Medication 2 G: at 08:27

## 2025-07-03 RX ADMIN — PROPOFOL 200 MG: 10 INJECTION, EMULSION INTRAVENOUS at 08:33

## 2025-07-03 RX ADMIN — LIDOCAINE HYDROCHLORIDE 50 MG: 20 INJECTION, SOLUTION INFILTRATION; PERINEURAL at 08:33

## 2025-07-03 RX ADMIN — DEXMEDETOMIDINE HYDROCHLORIDE 4 MCG: 100 INJECTION, SOLUTION INTRAVENOUS at 08:48

## 2025-07-03 RX ADMIN — DEXAMETHASONE SODIUM PHOSPHATE 8 MG: 4 INJECTION, SOLUTION INTRA-ARTICULAR; INTRALESIONAL; INTRAMUSCULAR; INTRAVENOUS; SOFT TISSUE at 08:38

## 2025-07-03 RX ADMIN — FENTANYL CITRATE 25 MCG: 50 INJECTION, SOLUTION INTRAMUSCULAR; INTRAVENOUS at 10:09

## 2025-07-03 ASSESSMENT — ACTIVITIES OF DAILY LIVING (ADL)
ADLS_ACUITY_SCORE: 25

## 2025-07-03 NOTE — ANESTHESIA CARE TRANSFER NOTE
Patient: Deirdre Zapata    Procedure: Procedure(s):  APPENDECTOMY, LAPAROSCOPIC with lysis of adhesions       Diagnosis: Chronic appendicitis [K36]  Diagnosis Additional Information: No value filed.    Anesthesia Type:   General     Note:    Oropharynx: oropharynx clear of all foreign objects  Level of Consciousness: awake  Oxygen Supplementation: face mask  Level of Supplemental Oxygen (L/min / FiO2): 6  Independent Airway: airway patency satisfactory and stable  Dentition: dentition unchanged  Vital Signs Stable: post-procedure vital signs reviewed and stable  Report to RN Given: handoff report given  Patient transferred to: PACU    Handoff Report: Identifed the Patient, Identified the Reponsible Provider, Reviewed the pertinent medical history, Discussed the surgical course, Reviewed Intra-OP anesthesia mangement and issues during anesthesia, Set expectations for post-procedure period and Allowed opportunity for questions and acknowledgement of understanding      Vitals:  Vitals Value Taken Time   /73 07/03/25 09:32   Temp 97.16  F (36.2  C) 07/03/25 09:34   Pulse 97 07/03/25 09:34   Resp 13 07/03/25 09:34   SpO2 99 % 07/03/25 09:34   Vitals shown include unfiled device data.    Electronically Signed By: ERIC Blum CRNA  July 3, 2025  9:35 AM

## 2025-07-03 NOTE — INTERVAL H&P NOTE
"I have reviewed the surgical (or preoperative) H&P that is linked to this encounter, and examined the patient. There are no significant changes    Clinical Conditions Present on Arrival:  Clinically Significant Risk Factors Present on Admission                       # Obesity: Estimated body mass index is 30.23 kg/m  as calculated from the following:    Height as of this encounter: 1.607 m (5' 3.27\").    Weight as of this encounter: 78.1 kg (172 lb 1.6 oz).       "

## 2025-07-03 NOTE — ANESTHESIA PREPROCEDURE EVALUATION
Anesthesia Pre-Procedure Evaluation    Patient: Deirdre Zapata   MRN: [id number] : 2000          Procedure : Procedure(s):  APPENDECTOMY, LAPAROSCOPIC         Past Medical History:   Diagnosis Date    Appendicitis     Asthma     Depression     JERRY (generalized anxiety disorder)     Hypothyroidism     PCOS (polycystic ovarian syndrome)       Past Surgical History:   Procedure Laterality Date    DENTAL SURGERY  2017    HIP SURGERY      L hip scope    LAPAROSCOPIC APPENDECTOMY N/A 2025    Procedure: Diagnostic Laparoscopy;  Surgeon: Tra Gore MD;  Location:  OR    PODIATRY/FOOT & ANKLE SURGERY REFERRAL Bilateral     Dr. Tompkins    TONSILLECTOMY, ADENOIDECTOMY, COMBINED        Allergies   Allergen Reactions    Other Environmental Allergy Hives and Itching     POLLEN, TREES    Cats     Gluten Meal     Morphine Nausea and Vomiting    Dog Epithelium (Canis Lupus Familiaris) Hives and Itching      Social History     Tobacco Use    Smoking status: Never     Passive exposure: Never    Smokeless tobacco: Never   Substance Use Topics    Alcohol use: Yes     Alcohol/week: 3.0 standard drinks of alcohol     Types: 3 Standard drinks or equivalent per week     Comment: weekends      Wt Readings from Last 1 Encounters:   25 78.1 kg (172 lb 1.6 oz)        Anesthesia Evaluation   Pt has had prior anesthetic.     No history of anesthetic complications       ROS/MED HX  ENT/Pulmonary:     (+)                     Intermittent, asthma                  Neurologic:       Cardiovascular:       METS/Exercise Tolerance:     Hematologic:       Musculoskeletal:       GI/Hepatic:     (+)         appendicitis,        (-) GERD   Renal/Genitourinary:       Endo: Comment: PCOS    (+)          thyroid problem, hypothyroidism,           Psychiatric/Substance Use:       Infectious Disease:       Malignancy:       Other:              Physical Exam  Airway  Mallampati: I  TM distance: >3 FB  Neck ROM:  full  Mouth opening: >= 4 cm    Cardiovascular    Dental   (+) Minor Abnormalities - some fillings, tiny chips      Pulmonary       Neurological   Other Findings       OUTSIDE LABS:  CBC:   Lab Results   Component Value Date    WBC 8.1 06/26/2025    WBC 12.2 (H) 05/23/2025    HGB 13.2 06/26/2025    HGB 9.7 (L) 05/21/2025    HCT 41.6 06/26/2025    HCT 29.5 (L) 05/21/2025     06/26/2025     05/21/2025     BMP:   Lab Results   Component Value Date     05/19/2025    .3 02/09/2023    POTASSIUM 4.5 05/19/2025    POTASSIUM 4.74 02/09/2023    CHLORIDE 101 05/19/2025    CHLORIDE 105.4 02/09/2023    CO2 24 05/19/2025    CO2 28.7 02/09/2023    BUN 11.3 05/19/2025    BUN 10 02/09/2023    BUN 11.8 02/09/2023    CR 0.87 05/19/2025    CR 0.85 02/09/2023     (H) 05/19/2025    GLC 79 02/09/2023     COAGS:   Lab Results   Component Value Date    INR 1.0 02/09/2023     POC:   Lab Results   Component Value Date    HCGS Negative 05/19/2025     HEPATIC:   Lab Results   Component Value Date    ALBUMIN 3.9 05/19/2025    PROTTOTAL 7.5 05/19/2025    ALT 13 05/19/2025    AST 25 05/19/2025    ALKPHOS 79 05/19/2025    BILITOTAL 0.2 05/19/2025     OTHER:   Lab Results   Component Value Date    JENNIFER 9.1 05/19/2025    LIPASE 13 05/19/2025    TSH 6.79 (H) 03/20/2025    T4 1.1 03/20/2025    CRP 4.8 03/08/2022    SED 9 03/08/2022       Anesthesia Plan    ASA Status:  2      NPO Status: NPO Appropriate   Anesthesia Type: General.  Airway: oral.  Induction: intravenous.  Maintenance: Balanced.   Techniques and Equipment:       - Monitoring Plan: standard ASA monitoring     Consents    Anesthesia Plan(s) and associated risks, benefits, and realistic alternatives discussed. Questions answered and patient/representative(s) expressed understanding.     - Discussed:     - Discussed with:  Patient, family               Postoperative Care         Comments:                   Rubén Velazquez MD    I have reviewed the  "pertinent notes and labs in the chart from the past 30 days and (re)examined the patient.  Any updates or changes from those notes are reflected in this note.    Clinically Significant Risk Factors Present on Admission                             # Obesity: Estimated body mass index is 30.23 kg/m  as calculated from the following:    Height as of this encounter: 1.607 m (5' 3.27\").    Weight as of this encounter: 78.1 kg (172 lb 1.6 oz).       # Financial/Environmental Concerns:    # Asthma: noted on problem list              "

## 2025-07-03 NOTE — DISCHARGE INSTRUCTIONS
HOME CARE FOLLOWING APPENDECTOMY  EFREN Daily, HELEN Jarrett C. Pratt, J. Shaheen    INCISIONAL CARE:  Replace the bandage over your incision(s) until all drainage stops, or if more comfortable to have in place.  If present, leave the steri-strips (white paper tapes) in place for 14 days after surgery.  If Dermabond (a type of skin glue) is present, leave in place until it wears/flakes off (2-3 weeks).     BATHING:  OK to shower 48 hours after surgery.  Avoid baths for 1 week after surgery.  You may wash your hair at any time.  Gently pat your incision dry after bathing.  Do not apply lotions, creams, or ointments to incisions.    ACTIVITY:  Light Activity -- you may immediately be up and about as tolerated.  Walking is encouraged, increase as tolerated.  Driving/Light Work-- when comfortable and off narcotic pain medications.  Strenuous Work/Activity -- limit lifting to 20 pounds for 2 weeks.  Progressively increase with time.  Active Sports (running, biking, etc.) -- cautiously resume after 2 weeks.    DISCOMFORT:  Local anesthetic placed at surgery should provide relief for 4-8 hours.  Begin taking pain pills before discomfort is severe.  Take the pain medication with some food, when possible, to minimize side effects.  Intermittent use of ice packs may help during the first 1-3 weeks after surgery.  Expect gradual improvement.    Over-the-counter anti-inflammatory medications (i.e. Ibuprofen/Advil/Motrin or Naprosyn/Aleve) may be used per package instructions in addition to or while tapering off the narcotic pain medications to decrease swelling and sensitivity.  DO NOT TAKE these Anti-inflammatory medications if your primary physician has advised against doing so, or if you have acid reflux, ulcer, or bleeding disorder, or take blood-thinner medications.  Call your primary physician or the surgery office if you have medication questions.  You may have decreased energy level for 1-2  "weeks after surgery related to your recovery.    DIET:  Start with liquids and gradually resume your regular diet as tolerated.  Consider eating yogurt or taking a probiotic to help your \"gut pooja\" (good bacteria in the bowel/colon) return to normal while taking or after receiving antibiotics.  Drink plenty of fluids.  While taking pain medications, consider use of a stool softener, increase your fiber in your diet, or add a fiber supplement (like Metamucil, Citrucel) to help prevent constipation - a possible side effect of pain medications.    NAUSEA:  If nauseated from the anesthetic/pain meds; rest in bed, get up cautiously with assistance, and drink clear liquids (juice, tea, broth).    FOLLOW-UP AFTER SURGERY:  -Our office will contact you approximately 2-3 weeks after surgery to check on your progress and answer any questions you may have.  If you are doing well, you will not need to return for an office appointment.  If any concerns are identified over the phone, we will help you make an appointment to see a provider.    -If you have not received a phone call, have any questions or concerns, or would like to be seen, please call us at [phone].  We are located at: [address], [address]; [place] [address]    -CONTACT US IF THE FOLLOWING DEVELOPS:   1. A fever that is above 101     2. Increased redness, warmth, drainage, bleeding, or swelling.   3. Pain that is not relieved by rest/ice and your prescription.   4.  Increasing pain after 48 hours.   5. Drainage that is thick, cloudy, yellow, green or white.   6. Any other questions or concerns.      FREQUENTLY ASKED QUESTIONS:    Q:  How should my incision look?    A:  Normally your incision will appear slightly swollen with light redness directly along the incision itself as it heals.  It may feel like a bump or ridge as the healing/scarring happens, and over time (3-4 months) this bump or ridge feeling should slowly go away.  In general, clear " or pink watery drainage can be normal at first as your incision heals, but should decrease over time.    Q:  How do I know if my incision is infected?  A:  Look at your incision for signs of infection, like redness around the incision spreading to surrounding skin, or drainage of cloudy or foul-smelling drainage.  If you feel warm, check your temperature to see if you are running a fever.    **If any of these things occur, please notify the nurse at our office.  We may need you to come into the office for an incision check.      Q:  How do I take care of my incision?  A:  If you have a dressing in place - Starting the day after surgery, replace the dressing 1-2 times a day until there is no further drainage from the incision.  At that time, a dressing is no longer needed.  Try to minimize tape on the skin if irritation is occurring at the tape sites.  If you have significant irritation from tape on the skin, please call the office to discuss other method of dressing your incision.    Small pieces of tape called  steri-strips  may be present directly overlying your incision; these may be removed 10 days after surgery unless otherwise specified by your surgeon.  If these tapes start to loosen at the ends, you may trim them back until they fall off or are removed.    A:  If you had  Dermabond  tissue glue used as a dressing (this causes your incision to look shiny with a clear covering over it) - This type of dressing wears off with time and does not require more dressings over the top unless it is draining around the glue as it wears off.  Do not apply ointments or lotions over the incisions until the glue has completely worn off.    Q:  There is a piece of tape or a sticky  lead  still on my skin.  Can I remove this?  A:  Sometimes the sticky  leads  used for monitoring during surgery or for evaluation in the emergency department are not all removed while you are in the hospital.  These sometimes have a tab or metal  dot on them.  You can easily remove these on your own, like taking off a band-aid.  If there is a gel substance under the  lead , simply wipe/clean it off with a washcloth or paper towel.      Q:  What can I do to minimize constipation (very hard stools, or lack of stools)?  A:  Stay well hydrated.  Increase your dietary fiber intake or take a fiber supplement -with plenty of water.  Walk around frequently.  You may consider an over-the-counter stool-softener.  Your Pharmacist can assist you with choosing one that is stocked at your pharmacy.  Constipation is also one of the most common side effects of pain medication.  If you are using pain medication, be pro-active and try to PREVENT problems with constipation by taking the steps above BEFORE constipation becomes a problem.    Q:  What do I do if I need more pain medications?  A:  Call the office to receive refills.  Be aware that certain pain meds cannot be called into a pharmacy and actually require a paper prescription.  A change may be made in your pain med as you progress thru your recovery period or if you have side effects to certain meds.    --Pain meds are NOT refilled after 5pm on weekdays, and NOT AT ALL on the weekends, so please look ahead to prevent problems.    Q:  Why am I having a hard time sleeping now that I am at home?  A:  Many medications you receive while you are in the hospital can impact your sleep for a number of days after your surgery/hospitalization.  Decreased level of activity and naps during the day may also make sleeping at night difficult.  Try to minimize day-time naps, and get up frequently during the day to walk around your home during your recovery time.  Sleep aides may be of some help, but are not recommended for long-term use.      Q:  I am having some back discomfort.  What should I do?  A:  This may be related to certain positioning that was required for your surgery, extended periods of time in bed, or other changes in  your overall activity level.  You may try ice, heat, acetaminophen, or ibuprofen to treat this temporarily.  Note that many pain medications have acetaminophen in them and would state this on the prescription bottle.  Be sure not to exceed the maximum of 4000mg per day of acetaminophen.     **If the pain you are having does not resolve, is severe, or is a flare of back pain you have had on other occasions prior to surgery, please contact your primary physician for further recommendations or for an appointment to be examined at their office.    Q:  Why am I having headaches?  A:  Headaches can be caused by many things:  caffeine withdrawal, use of pain meds, dehydration, high blood pressure, lack of sleep, over-activity/exhaustion, flare-up of usual migraine headaches.  If you feel this is related to muscle tension (a band-like feeling around the head, or a pressure at the low-back of the head) you may try ice or heat to this area.  You may need to drink more fluids (try electrolyte drink like Gatorade), rest, or take your usual migraine medications.   **If your headaches do not resolve, worsen, are accompanied by other symptoms, or if your blood pressure is high, please call your primary physician for recommendation and/or examination.    Q:  I am unable to urinate.  What do I do?  A:  A small percentage of people can have difficulty urinating initially after surgery.  This includes being able to urinate only a very small amount at a time and feeling discomfort or pressure in the very low abdomen.  This is called  urinary retention , and is actually an urgent situation.  Proceed to your nearest Emergency department for evaluation (not an Urgent Care Center).  Sometimes the bladder does not work correctly after certain medications you receive during surgery, or related to certain procedures.  You may need to have a catheter placed until your bladder recovers.  When planning to go to an Emergency department, it may  help to call the ER to let them know you are coming in for this problem after a surgery.  This may help you get in quicker to be evaluated.  **If you have symptoms of a urinary tract infection, please contact your primary physician for the proper evaluation and treatment.        If you have other questions, please call the office Monday thru Friday between 8am and 4:30pm to discuss with the Nurse or Physician Assistant.  #[phone]    There is a surgeon ON CALL on weekday evenings and over the weekend in case of urgent need only, and may be contacted at the same number.    If you are having an emergency, call 911 or proceed to your nearest emergency department.    Maximum acetaminophen (Tylenol) dose from all sources should not exceed 4 grams (4000 mg) per day.  You had 975mg of tylenol at 7:10am, please do not take any more tylenol until 1:10pm if needed.    You received Toradol, an IV form of Ibuprofen (Motrin) at 9:15am.  Do not take any Ibuprofen products until 3:15pm.     You had 5mg of oxycodone at 10:25am.

## 2025-07-03 NOTE — ANESTHESIA POSTPROCEDURE EVALUATION
Patient: Deirdre Zapata    Procedure: Procedure(s):  APPENDECTOMY, LAPAROSCOPIC with lysis of adhesions       Anesthesia Type:  General    Note:  Disposition: Outpatient   Postop Pain Control: Uneventful            Sign Out: Well controlled pain   PONV: No   Neuro/Psych: Uneventful            Sign Out: Acceptable/Baseline neuro status   Airway/Respiratory: Uneventful            Sign Out: Acceptable/Baseline resp. status   CV/Hemodynamics: Uneventful            Sign Out: Acceptable CV status; No obvious hypovolemia; No obvious fluid overload   Other NRE: NONE   DID A NON-ROUTINE EVENT OCCUR? No           Last vitals:  Vitals Value Taken Time   /85 07/03/25 10:40   Temp 97.2  F (36.2  C) 07/03/25 10:40   Pulse 78 07/03/25 10:40   Resp 18 07/03/25 10:40   SpO2 97 % 07/03/25 10:40       Electronically Signed By: Rubén Velazquez MD  July 3, 2025  11:31 AM

## 2025-07-03 NOTE — OP NOTE
General Surgery Operative Note      Pre-operative diagnosis: Chronic appendicitis, history of phlegmon.   Post-operative diagnosis: same    Procedure: laparoscopic appendectomy, lysis of adhesions.   Surgeon: Tra Gore MD   Assistant(s): Prakash Bateman PA-C  The Physician Assistant was medically necessary for their expertise in prepping, camera management, suctioning, suturing and retraction.   Anesthesia: general    Estimated blood loss:  Complications: 10 cc  none   Specimens: ID Type Source Tests Collected by Time Destination   1 : Appendix Tissue Appendix SURGICAL PATHOLOGY EXAM Tra Gore MD 7/3/2025  9:15 AM         DESCRIPTION OF PROCEDURE:  The patient was placed on the table in supine position.  General endotracheal anesthesia was induced and the abdomen was prepped and draped in standard sterile fashion.  An incision was made just above the umbilicus with a blade.  The incision was carried down to the fascia.  Fascia was incised with a blade.  The peritoneum was entered bluntly with a Carmalt clamp.  Two interrupted 0 Vicryl sutures were placed at the extremes of this fascial incision.  The Norma trocar was introduced and the abdomen was insufflated with CO2.  Two 5 mm trocars were placed in the infraumbilical midline, one two fingerbreadths above the pubic symphysis and one correction between the pubic symphysis and the umbilicus through her prior scars.  The patient was placed in Trendelenburg and right side up.  As before, the cecum was draped down into the pelvis; there were filmy adhesions between the cecum and sigmoid mesentery which were take down with blunt dissection and a LigaSure. We also took down adhesions lateral to the cecum and were thus able to bring it up into view. The appendix was identified curled back on itself behind the cecum. This was gently peeled off the mesocolon bluntly. The mesentery of the appendix was divided with multiple bites of the LigaSure. The  the appendix was divided just above the junction with the cecum using two loads of white endo CARRIE staples.  The appendix was passed into an Endocatch bag and removed through the umbilical trocar site.  The right lower quadrant was inspected for hemostasis.  Hemostasis was assured.  We irrigated with copious amounts of sterile saline and aspirated the effluent.  The 2 infraumbilical trocars were removed under direct visualization.  There was no bleeding from either of these sites.  The Norma trocar was removed and the abdomen was evacuated of CO2.  An additional interrupted 0 Vicryl suture was placed in the umbilical trocar site fascia.  Each of the three 0 Vicryl sutures was cinched down and tied.  The skin of all 3 incisions was anesthetized with local anesthetic and closed with interrupted 4-0 Vicryl subcuticular sutures and Dermabond.  The patient tolerated the procedure well.  Sponge and instrument counts were correct.    Tra Gore MD

## 2025-07-03 NOTE — ANESTHESIA PROCEDURE NOTES
Airway       Patient location during procedure: OR       Procedure Start/Stop Times: 7/3/2025 8:37 AM  Staff -        CRNA: Massiel Whitehead APRN CRNA       Performed By: CRNA  Consent for Airway        Urgency: elective  Indications and Patient Condition       Indications for airway management: john-procedural       Induction type:intravenous       Mask difficulty assessment: 1 - vent by mask    Final Airway Details       Final airway type: endotracheal airway       Successful airway: ETT - single  Endotracheal Airway Details        ETT size (mm): 7.0       Cuffed: yes       Successful intubation technique: direct laryngoscopy       DL Blade Type: MAC 3       Grade View of Cords: 1       Adjucts: stylet       Position: Right       Measured from: gums/teeth       Secured at (cm): 22       Bite block used: None    Post intubation assessment        Placement verified by: capnometry, equal breath sounds and chest rise        Number of attempts at approach: 1       Number of other approaches attempted: 0       Secured with: tape       Ease of procedure: easy       Dentition: Intact and Unchanged    Medication(s) Administered   Medication Administration Time: 7/3/2025 8:37 AM

## 2025-07-08 LAB
PATH REPORT.COMMENTS IMP SPEC: NORMAL
PATH REPORT.FINAL DX SPEC: NORMAL
PATH REPORT.GROSS SPEC: NORMAL
PATH REPORT.MICROSCOPIC SPEC OTHER STN: NORMAL
PATH REPORT.RELEVANT HX SPEC: NORMAL
PHOTO IMAGE: NORMAL

## 2025-07-21 ENCOUNTER — TELEPHONE (OUTPATIENT)
Dept: SURGERY | Facility: CLINIC | Age: 25
End: 2025-07-21
Payer: COMMERCIAL

## 2025-07-21 NOTE — TELEPHONE ENCOUNTER
Attempted to call patient for post op check. No answer.  A message was left for patient to call back if they had any questions or concerns.     Prakash Bateman PA-C

## 2025-07-22 ENCOUNTER — TRANSFERRED RECORDS (OUTPATIENT)
Dept: FAMILY MEDICINE | Facility: CLINIC | Age: 25
End: 2025-07-22

## 2025-08-10 ENCOUNTER — HEALTH MAINTENANCE LETTER (OUTPATIENT)
Age: 25
End: 2025-08-10

## (undated) DEVICE — SU VICRYL+ 4-0 UNDYED PS-2 VCP496ZH

## (undated) DEVICE — Device

## (undated) DEVICE — SOLUTION IRRIGATION 0.9% NACL 1000ML BOTTLE R5200-01

## (undated) DEVICE — STPL POWERED ECHELON 45MM PSEE45A

## (undated) DEVICE — ENDO TROCAR BLUNT TIP KII BALLOON 12X100MM C0R47

## (undated) DEVICE — LINEN POUCH DBL 5427

## (undated) DEVICE — LINEN HALF SHEET 5512

## (undated) DEVICE — BAG CLEAR TRASH 1.3M 39X33" P4040C

## (undated) DEVICE — VIAL DECANTER STERILE WHITE DYNJDEC06

## (undated) DEVICE — ESU CORD MONOPOLAR 10'  E0510

## (undated) DEVICE — GLOVE PROTEXIS W/NEU-THERA 7.5  2D73TE75

## (undated) DEVICE — SUCTION MANIFOLD NEPTUNE 2 SYS 4 PORT 0702-020-000

## (undated) DEVICE — SU VICRYL+ 0 27 UR6 VLT VCP603H

## (undated) DEVICE — SOLUTION IV 0.9% NACL 1000ML E8000

## (undated) DEVICE — ESU GROUND PAD ADULT W/CORD E7507

## (undated) DEVICE — BLADE KNIFE SURG 11 371111

## (undated) DEVICE — DRAIN JACKSON PRATT RESERVOIR 100ML SU130-1305

## (undated) DEVICE — ENDO TROCAR FIRST ENTRY KII FIOS Z-THRD 05X100MM CTF03

## (undated) DEVICE — ENDO TROCAR SLEEVE KII Z-THREADED 05X100MM CTS02

## (undated) DEVICE — GLOVE PROTEXIS BLUE W/NEU-THERA 8.0  2D73EB80

## (undated) DEVICE — SU DERMABOND ADVANCED .7ML DNX12

## (undated) DEVICE — LINEN TOWEL PACK X10 5473

## (undated) DEVICE — LINEN FULL SHEET 5511

## (undated) DEVICE — SUCTION IRR STRYKERFLOW II W/TIP 250-070-520

## (undated) RX ORDER — GLYCOPYRROLATE 0.2 MG/ML
INJECTION, SOLUTION INTRAMUSCULAR; INTRAVENOUS
Status: DISPENSED
Start: 2025-05-20

## (undated) RX ORDER — PROPOFOL 10 MG/ML
INJECTION, EMULSION INTRAVENOUS
Status: DISPENSED
Start: 2025-05-20

## (undated) RX ORDER — FENTANYL CITRATE 50 UG/ML
INJECTION, SOLUTION INTRAMUSCULAR; INTRAVENOUS
Status: DISPENSED
Start: 2025-07-03

## (undated) RX ORDER — CEFAZOLIN SODIUM/WATER 2 G/20 ML
SYRINGE (ML) INTRAVENOUS
Status: DISPENSED
Start: 2025-07-03

## (undated) RX ORDER — DEXAMETHASONE SODIUM PHOSPHATE 4 MG/ML
INJECTION, SOLUTION INTRA-ARTICULAR; INTRALESIONAL; INTRAMUSCULAR; INTRAVENOUS; SOFT TISSUE
Status: DISPENSED
Start: 2025-05-20

## (undated) RX ORDER — LIDOCAINE HYDROCHLORIDE 10 MG/ML
INJECTION, SOLUTION EPIDURAL; INFILTRATION; INTRACAUDAL; PERINEURAL
Status: DISPENSED
Start: 2025-07-03

## (undated) RX ORDER — KETOROLAC TROMETHAMINE 30 MG/ML
INJECTION, SOLUTION INTRAMUSCULAR; INTRAVENOUS
Status: DISPENSED
Start: 2025-05-20

## (undated) RX ORDER — PROPOFOL 10 MG/ML
INJECTION, EMULSION INTRAVENOUS
Status: DISPENSED
Start: 2025-07-03

## (undated) RX ORDER — CEFAZOLIN SODIUM/WATER 2 G/20 ML
SYRINGE (ML) INTRAVENOUS
Status: DISPENSED
Start: 2025-05-20

## (undated) RX ORDER — FENTANYL CITRATE 50 UG/ML
INJECTION, SOLUTION INTRAMUSCULAR; INTRAVENOUS
Status: DISPENSED
Start: 2025-05-20

## (undated) RX ORDER — ONDANSETRON 2 MG/ML
INJECTION INTRAMUSCULAR; INTRAVENOUS
Status: DISPENSED
Start: 2025-05-20

## (undated) RX ORDER — DEXAMETHASONE SODIUM PHOSPHATE 4 MG/ML
INJECTION, SOLUTION INTRA-ARTICULAR; INTRALESIONAL; INTRAMUSCULAR; INTRAVENOUS; SOFT TISSUE
Status: DISPENSED
Start: 2025-07-03

## (undated) RX ORDER — OXYCODONE HYDROCHLORIDE 5 MG/1
TABLET ORAL
Status: DISPENSED
Start: 2025-07-03

## (undated) RX ORDER — GLYCOPYRROLATE 0.2 MG/ML
INJECTION, SOLUTION INTRAMUSCULAR; INTRAVENOUS
Status: DISPENSED
Start: 2025-07-03

## (undated) RX ORDER — ONDANSETRON 2 MG/ML
INJECTION INTRAMUSCULAR; INTRAVENOUS
Status: DISPENSED
Start: 2025-07-03

## (undated) RX ORDER — LIDOCAINE HYDROCHLORIDE 10 MG/ML
INJECTION, SOLUTION EPIDURAL; INFILTRATION; INTRACAUDAL; PERINEURAL
Status: DISPENSED
Start: 2025-05-20

## (undated) RX ORDER — HYDROMORPHONE HCL IN WATER/PF 6 MG/30 ML
PATIENT CONTROLLED ANALGESIA SYRINGE INTRAVENOUS
Status: DISPENSED
Start: 2025-05-20

## (undated) RX ORDER — BUPIVACAINE HYDROCHLORIDE 5 MG/ML
INJECTION, SOLUTION EPIDURAL; INTRACAUDAL; PERINEURAL
Status: DISPENSED
Start: 2025-05-20

## (undated) RX ORDER — KETOROLAC TROMETHAMINE 30 MG/ML
INJECTION, SOLUTION INTRAMUSCULAR; INTRAVENOUS
Status: DISPENSED
Start: 2025-07-03

## (undated) RX ORDER — BUPIVACAINE HYDROCHLORIDE 5 MG/ML
INJECTION, SOLUTION EPIDURAL; INTRACAUDAL; PERINEURAL
Status: DISPENSED
Start: 2025-07-03

## (undated) RX ORDER — ACETAMINOPHEN 325 MG/1
TABLET ORAL
Status: DISPENSED
Start: 2025-07-03